# Patient Record
Sex: FEMALE | Race: WHITE | Employment: OTHER | URBAN - METROPOLITAN AREA
[De-identification: names, ages, dates, MRNs, and addresses within clinical notes are randomized per-mention and may not be internally consistent; named-entity substitution may affect disease eponyms.]

---

## 2020-05-05 ENCOUNTER — APPOINTMENT (OUTPATIENT)
Dept: CT IMAGING | Age: 60
DRG: 371 | End: 2020-05-05
Payer: MEDICARE

## 2020-05-05 ENCOUNTER — HOSPITAL ENCOUNTER (INPATIENT)
Age: 60
LOS: 3 days | Discharge: HOME | DRG: 371 | End: 2020-05-08
Attending: EMERGENCY MEDICINE | Admitting: STUDENT IN AN ORGANIZED HEALTH CARE EDUCATION/TRAINING PROGRAM
Payer: MEDICARE

## 2020-05-05 PROBLEM — K52.9 COLITIS: Status: ACTIVE | Noted: 2020-05-05

## 2020-05-05 LAB
ALBUMIN SERPL-MCNC: 3.8 GM/DL (ref 3.4–5)
ALP BLD-CCNC: 89 IU/L (ref 40–128)
ALT SERPL-CCNC: 33 U/L (ref 10–40)
AMPHETAMINES: ABNORMAL
ANION GAP SERPL CALCULATED.3IONS-SCNC: 14 MMOL/L (ref 4–16)
AST SERPL-CCNC: 55 IU/L (ref 15–37)
BACTERIA: ABNORMAL /HPF
BARBITURATE SCREEN URINE: NEGATIVE
BASOPHILS ABSOLUTE: 0.1 K/CU MM
BASOPHILS RELATIVE PERCENT: 0.5 % (ref 0–1)
BENZODIAZEPINE SCREEN, URINE: NEGATIVE
BILIRUB SERPL-MCNC: 0.7 MG/DL (ref 0–1)
BILIRUBIN URINE: NEGATIVE MG/DL
BLOOD, URINE: NEGATIVE
BUN BLDV-MCNC: 25 MG/DL (ref 6–23)
CALCIUM SERPL-MCNC: 9.8 MG/DL (ref 8.3–10.6)
CANNABINOID SCREEN URINE: ABNORMAL
CHLORIDE BLD-SCNC: 88 MMOL/L (ref 99–110)
CLARITY: CLEAR
CO2: 25 MMOL/L (ref 21–32)
COCAINE METABOLITE: ABNORMAL
COLOR: ABNORMAL
CREAT SERPL-MCNC: 1.1 MG/DL (ref 0.6–1.1)
DIFFERENTIAL TYPE: ABNORMAL
EOSINOPHILS ABSOLUTE: 0 K/CU MM
EOSINOPHILS RELATIVE PERCENT: 0.3 % (ref 0–3)
GFR AFRICAN AMERICAN: >60 ML/MIN/1.73M2
GFR NON-AFRICAN AMERICAN: 51 ML/MIN/1.73M2
GLUCOSE BLD-MCNC: 173 MG/DL (ref 70–99)
GLUCOSE, URINE: NEGATIVE MG/DL
HCT VFR BLD CALC: 52 % (ref 37–47)
HEMOGLOBIN: 17.5 GM/DL (ref 12.5–16)
HYALINE CASTS: 5 /LPF
IMMATURE NEUTROPHIL %: 0.4 % (ref 0–0.43)
KETONES, URINE: NEGATIVE MG/DL
LACTATE: 2.2 MMOL/L (ref 0.4–2)
LEUKOCYTE ESTERASE, URINE: NEGATIVE
LIPASE: 10 IU/L (ref 13–60)
LYMPHOCYTES ABSOLUTE: 0.8 K/CU MM
LYMPHOCYTES RELATIVE PERCENT: 6.7 % (ref 24–44)
MCH RBC QN AUTO: 32 PG (ref 27–31)
MCHC RBC AUTO-ENTMCNC: 33.7 % (ref 32–36)
MCV RBC AUTO: 95.1 FL (ref 78–100)
MONOCYTES ABSOLUTE: 0.3 K/CU MM
MONOCYTES RELATIVE PERCENT: 2.1 % (ref 0–4)
MUCUS: ABNORMAL HPF
NITRITE URINE, QUANTITATIVE: NEGATIVE
NUCLEATED RBC %: 0 %
OPIATES, URINE: ABNORMAL
OXYCODONE: NEGATIVE
PDW BLD-RTO: 12.3 % (ref 11.7–14.9)
PH, URINE: 5 (ref 5–8)
PHENCYCLIDINE, URINE: NEGATIVE
PLATELET # BLD: 363 K/CU MM (ref 140–440)
PMV BLD AUTO: 8.7 FL (ref 7.5–11.1)
POTASSIUM SERPL-SCNC: 4 MMOL/L (ref 3.5–5.1)
PROTEIN UA: 30 MG/DL
RBC # BLD: 5.47 M/CU MM (ref 4.2–5.4)
RBC URINE: <1 /HPF (ref 0–6)
SEGMENTED NEUTROPHILS ABSOLUTE COUNT: 10.9 K/CU MM
SEGMENTED NEUTROPHILS RELATIVE PERCENT: 90 % (ref 36–66)
SODIUM BLD-SCNC: 127 MMOL/L (ref 135–145)
SPECIFIC GRAVITY UA: 1.01 (ref 1–1.03)
SQUAMOUS EPITHELIAL: <1 /HPF
TOTAL IMMATURE NEUTOROPHIL: 0.05 K/CU MM
TOTAL NUCLEATED RBC: 0 K/CU MM
TOTAL PROTEIN: 7.3 GM/DL (ref 6.4–8.2)
TRICHOMONAS: ABNORMAL /HPF
UROBILINOGEN, URINE: 2 MG/DL (ref 0.2–1)
WBC # BLD: 12.2 K/CU MM (ref 4–10.5)
WBC UA: 1 /HPF (ref 0–5)

## 2020-05-05 PROCEDURE — 99285 EMERGENCY DEPT VISIT HI MDM: CPT

## 2020-05-05 PROCEDURE — 6360000004 HC RX CONTRAST MEDICATION: Performed by: EMERGENCY MEDICINE

## 2020-05-05 PROCEDURE — 1200000000 HC SEMI PRIVATE

## 2020-05-05 PROCEDURE — 83605 ASSAY OF LACTIC ACID: CPT

## 2020-05-05 PROCEDURE — 96372 THER/PROPH/DIAG INJ SC/IM: CPT

## 2020-05-05 PROCEDURE — 74177 CT ABD & PELVIS W/CONTRAST: CPT

## 2020-05-05 PROCEDURE — 81001 URINALYSIS AUTO W/SCOPE: CPT

## 2020-05-05 PROCEDURE — 80053 COMPREHEN METABOLIC PANEL: CPT

## 2020-05-05 PROCEDURE — 2580000003 HC RX 258: Performed by: STUDENT IN AN ORGANIZED HEALTH CARE EDUCATION/TRAINING PROGRAM

## 2020-05-05 PROCEDURE — 2500000003 HC RX 250 WO HCPCS: Performed by: EMERGENCY MEDICINE

## 2020-05-05 PROCEDURE — 6370000000 HC RX 637 (ALT 250 FOR IP): Performed by: STUDENT IN AN ORGANIZED HEALTH CARE EDUCATION/TRAINING PROGRAM

## 2020-05-05 PROCEDURE — 2580000003 HC RX 258: Performed by: EMERGENCY MEDICINE

## 2020-05-05 PROCEDURE — 2700000000 HC OXYGEN THERAPY PER DAY

## 2020-05-05 PROCEDURE — 6360000002 HC RX W HCPCS: Performed by: STUDENT IN AN ORGANIZED HEALTH CARE EDUCATION/TRAINING PROGRAM

## 2020-05-05 PROCEDURE — 80307 DRUG TEST PRSMV CHEM ANLYZR: CPT

## 2020-05-05 PROCEDURE — 96374 THER/PROPH/DIAG INJ IV PUSH: CPT

## 2020-05-05 PROCEDURE — 85025 COMPLETE CBC W/AUTO DIFF WBC: CPT

## 2020-05-05 PROCEDURE — 6360000002 HC RX W HCPCS: Performed by: EMERGENCY MEDICINE

## 2020-05-05 PROCEDURE — 6370000000 HC RX 637 (ALT 250 FOR IP): Performed by: EMERGENCY MEDICINE

## 2020-05-05 PROCEDURE — 94761 N-INVAS EAR/PLS OXIMETRY MLT: CPT

## 2020-05-05 PROCEDURE — 96375 TX/PRO/DX INJ NEW DRUG ADDON: CPT

## 2020-05-05 PROCEDURE — 96376 TX/PRO/DX INJ SAME DRUG ADON: CPT

## 2020-05-05 PROCEDURE — 83690 ASSAY OF LIPASE: CPT

## 2020-05-05 RX ORDER — MORPHINE SULFATE 4 MG/ML
4 INJECTION, SOLUTION INTRAMUSCULAR; INTRAVENOUS EVERY 30 MIN PRN
Status: DISCONTINUED | OUTPATIENT
Start: 2020-05-05 | End: 2020-05-05

## 2020-05-05 RX ORDER — SODIUM CHLORIDE 0.9 % (FLUSH) 0.9 %
10 SYRINGE (ML) INJECTION EVERY 12 HOURS SCHEDULED
Status: DISCONTINUED | OUTPATIENT
Start: 2020-05-05 | End: 2020-05-08 | Stop reason: HOSPADM

## 2020-05-05 RX ORDER — TRAMADOL HYDROCHLORIDE 50 MG/1
50 TABLET ORAL PRN
Status: DISCONTINUED | OUTPATIENT
Start: 2020-05-05 | End: 2020-05-08

## 2020-05-05 RX ORDER — ONDANSETRON 2 MG/ML
4 INJECTION INTRAMUSCULAR; INTRAVENOUS EVERY 30 MIN PRN
Status: DISCONTINUED | OUTPATIENT
Start: 2020-05-05 | End: 2020-05-05

## 2020-05-05 RX ORDER — DICYCLOMINE HYDROCHLORIDE 10 MG/ML
20 INJECTION INTRAMUSCULAR ONCE
Status: COMPLETED | OUTPATIENT
Start: 2020-05-05 | End: 2020-05-05

## 2020-05-05 RX ORDER — ACETAMINOPHEN 650 MG/1
650 SUPPOSITORY RECTAL EVERY 6 HOURS PRN
Status: DISCONTINUED | OUTPATIENT
Start: 2020-05-05 | End: 2020-05-08 | Stop reason: HOSPADM

## 2020-05-05 RX ORDER — CIPROFLOXACIN 500 MG/1
500 TABLET, FILM COATED ORAL ONCE
Status: COMPLETED | OUTPATIENT
Start: 2020-05-05 | End: 2020-05-05

## 2020-05-05 RX ORDER — POLYETHYLENE GLYCOL 3350 17 G/17G
17 POWDER, FOR SOLUTION ORAL DAILY PRN
Status: DISCONTINUED | OUTPATIENT
Start: 2020-05-05 | End: 2020-05-08 | Stop reason: HOSPADM

## 2020-05-05 RX ORDER — METRONIDAZOLE 250 MG/1
500 TABLET ORAL ONCE
Status: COMPLETED | OUTPATIENT
Start: 2020-05-05 | End: 2020-05-05

## 2020-05-05 RX ORDER — 0.9 % SODIUM CHLORIDE 0.9 %
1000 INTRAVENOUS SOLUTION INTRAVENOUS ONCE
Status: COMPLETED | OUTPATIENT
Start: 2020-05-05 | End: 2020-05-05

## 2020-05-05 RX ORDER — ACETAMINOPHEN 325 MG/1
650 TABLET ORAL EVERY 6 HOURS PRN
Status: DISCONTINUED | OUTPATIENT
Start: 2020-05-05 | End: 2020-05-08 | Stop reason: HOSPADM

## 2020-05-05 RX ORDER — PROMETHAZINE HYDROCHLORIDE 25 MG/1
12.5 TABLET ORAL EVERY 6 HOURS PRN
Status: DISCONTINUED | OUTPATIENT
Start: 2020-05-05 | End: 2020-05-08 | Stop reason: HOSPADM

## 2020-05-05 RX ORDER — DICYCLOMINE HCL 20 MG
20 TABLET ORAL EVERY 6 HOURS PRN
Status: DISCONTINUED | OUTPATIENT
Start: 2020-05-05 | End: 2020-05-08 | Stop reason: HOSPADM

## 2020-05-05 RX ORDER — DIPHENHYDRAMINE HCL 25 MG
50 TABLET ORAL NIGHTLY PRN
Status: DISCONTINUED | OUTPATIENT
Start: 2020-05-05 | End: 2020-05-08 | Stop reason: HOSPADM

## 2020-05-05 RX ORDER — SODIUM CHLORIDE 0.9 % (FLUSH) 0.9 %
10 SYRINGE (ML) INJECTION PRN
Status: DISCONTINUED | OUTPATIENT
Start: 2020-05-05 | End: 2020-05-08 | Stop reason: HOSPADM

## 2020-05-05 RX ORDER — MORPHINE SULFATE 4 MG/ML
8 INJECTION, SOLUTION INTRAMUSCULAR; INTRAVENOUS ONCE
Status: COMPLETED | OUTPATIENT
Start: 2020-05-05 | End: 2020-05-05

## 2020-05-05 RX ORDER — ONDANSETRON 2 MG/ML
4 INJECTION INTRAMUSCULAR; INTRAVENOUS EVERY 6 HOURS PRN
Status: DISCONTINUED | OUTPATIENT
Start: 2020-05-05 | End: 2020-05-08 | Stop reason: HOSPADM

## 2020-05-05 RX ORDER — CLONIDINE HYDROCHLORIDE 0.1 MG/1
0.1 TABLET ORAL PRN
Status: DISCONTINUED | OUTPATIENT
Start: 2020-05-05 | End: 2020-05-08 | Stop reason: HOSPADM

## 2020-05-05 RX ORDER — 0.9 % SODIUM CHLORIDE 0.9 %
500 INTRAVENOUS SOLUTION INTRAVENOUS ONCE
Status: COMPLETED | OUTPATIENT
Start: 2020-05-05 | End: 2020-05-05

## 2020-05-05 RX ADMIN — SODIUM CHLORIDE 1000 ML: 9 INJECTION, SOLUTION INTRAVENOUS at 08:27

## 2020-05-05 RX ADMIN — TRAMADOL HYDROCHLORIDE 50 MG: 50 TABLET, FILM COATED ORAL at 20:19

## 2020-05-05 RX ADMIN — METRONIDAZOLE 500 MG: 250 TABLET ORAL at 07:52

## 2020-05-05 RX ADMIN — ONDANSETRON 4 MG: 2 INJECTION INTRAMUSCULAR; INTRAVENOUS at 08:27

## 2020-05-05 RX ADMIN — PROMETHAZINE HYDROCHLORIDE 12.5 MG: 25 TABLET ORAL at 14:21

## 2020-05-05 RX ADMIN — ONDANSETRON 4 MG: 2 INJECTION INTRAMUSCULAR; INTRAVENOUS at 05:00

## 2020-05-05 RX ADMIN — CLONIDINE HYDROCHLORIDE 0.1 MG: 0.1 TABLET ORAL at 14:35

## 2020-05-05 RX ADMIN — DICYCLOMINE HYDROCHLORIDE 20 MG: 20 INJECTION, SOLUTION INTRAMUSCULAR at 08:27

## 2020-05-05 RX ADMIN — ONDANSETRON 4 MG: 2 INJECTION INTRAMUSCULAR; INTRAVENOUS at 20:18

## 2020-05-05 RX ADMIN — CLONIDINE HYDROCHLORIDE 0.1 MG: 0.1 TABLET ORAL at 16:20

## 2020-05-05 RX ADMIN — SODIUM CHLORIDE, PRESERVATIVE FREE 10 ML: 5 INJECTION INTRAVENOUS at 20:19

## 2020-05-05 RX ADMIN — DIPHENHYDRAMINE HYDROCHLORIDE 50 MG: 25 TABLET ORAL at 20:19

## 2020-05-05 RX ADMIN — TRAMADOL HYDROCHLORIDE 50 MG: 50 TABLET, FILM COATED ORAL at 14:35

## 2020-05-05 RX ADMIN — TRAMADOL HYDROCHLORIDE 50 MG: 50 TABLET, FILM COATED ORAL at 16:20

## 2020-05-05 RX ADMIN — MORPHINE SULFATE 4 MG: 4 INJECTION, SOLUTION INTRAMUSCULAR; INTRAVENOUS at 08:28

## 2020-05-05 RX ADMIN — FAMOTIDINE 20 MG: 10 INJECTION INTRAVENOUS at 08:27

## 2020-05-05 RX ADMIN — DICYCLOMINE HYDROCHLORIDE 20 MG: 20 TABLET ORAL at 20:19

## 2020-05-05 RX ADMIN — DICYCLOMINE HYDROCHLORIDE 20 MG: 20 TABLET ORAL at 14:21

## 2020-05-05 RX ADMIN — SODIUM CHLORIDE 500 ML: 9 INJECTION, SOLUTION INTRAVENOUS at 05:00

## 2020-05-05 RX ADMIN — CLONIDINE HYDROCHLORIDE 0.1 MG: 0.1 TABLET ORAL at 20:19

## 2020-05-05 RX ADMIN — MORPHINE SULFATE 8 MG: 4 INJECTION, SOLUTION INTRAMUSCULAR; INTRAVENOUS at 05:00

## 2020-05-05 RX ADMIN — CIPROFLOXACIN HYDROCHLORIDE 500 MG: 500 TABLET, FILM COATED ORAL at 07:52

## 2020-05-05 RX ADMIN — ACETAMINOPHEN 650 MG: 325 TABLET ORAL at 14:21

## 2020-05-05 RX ADMIN — IOPAMIDOL 75 ML: 755 INJECTION, SOLUTION INTRAVENOUS at 06:50

## 2020-05-05 ASSESSMENT — PAIN DESCRIPTION - FREQUENCY
FREQUENCY: CONTINUOUS
FREQUENCY: CONTINUOUS

## 2020-05-05 ASSESSMENT — PAIN SCALES - GENERAL
PAINLEVEL_OUTOF10: 10
PAINLEVEL_OUTOF10: 8
PAINLEVEL_OUTOF10: 10
PAINLEVEL_OUTOF10: 8
PAINLEVEL_OUTOF10: 10
PAINLEVEL_OUTOF10: 10
PAINLEVEL_OUTOF10: 0

## 2020-05-05 ASSESSMENT — PAIN DESCRIPTION - LOCATION
LOCATION: HEAD;ABDOMEN
LOCATION: ABDOMEN

## 2020-05-05 NOTE — ED NOTES
0815 paged hospitlalist     [facility]  05/05/20 0815  0831 repaged hospitalist     [facility]  05/05/20 0832  [phone] Dr Maisha Fontanez in ED     [facility]  05/05/20 2269

## 2020-05-05 NOTE — ED NOTES
Patient resting in bed with eyes shut. Easily woke. Resident assisted to commode to obtain urine sample but stated \"I cant pee. \" She sat on the commode for a bit with no urine produced.       Pelon Colindres, RN  05/05/20 3935

## 2020-05-05 NOTE — ED PROVIDER NOTES
05/05/20abjorn Menendez was checked out to me by Dr. Wendy Orellana. Please see his/her initial documentation for details of the patient's ED presentation, physical exam and completed studies. In brief, Amado Menendez is a 62 y.o. female that presents with abdominal pain awaiting labs/imaging.     I have reviewed and interpreted all of the currently available lab results from this visit (if applicable):  Results for orders placed or performed during the hospital encounter of 05/05/20   CBC Auto Differential   Result Value Ref Range    WBC 12.2 (H) 4.0 - 10.5 K/CU MM    RBC 5.47 (H) 4.2 - 5.4 M/CU MM    Hemoglobin 17.5 (H) 12.5 - 16.0 GM/DL    Hematocrit 52.0 (H) 37 - 47 %    MCV 95.1 78 - 100 FL    MCH 32.0 (H) 27 - 31 PG    MCHC 33.7 32.0 - 36.0 %    RDW 12.3 11.7 - 14.9 %    Platelets 109 345 - 185 K/CU MM    MPV 8.7 7.5 - 11.1 FL    Differential Type AUTOMATED DIFFERENTIAL     Segs Relative 90.0 (H) 36 - 66 %    Lymphocytes % 6.7 (L) 24 - 44 %    Monocytes % 2.1 0 - 4 %    Eosinophils % 0.3 0 - 3 %    Basophils % 0.5 0 - 1 %    Segs Absolute 10.9 K/CU MM    Lymphocytes Absolute 0.8 K/CU MM    Monocytes Absolute 0.3 K/CU MM    Eosinophils Absolute 0.0 K/CU MM    Basophils Absolute 0.1 K/CU MM    Nucleated RBC % 0.0 %    Total Nucleated RBC 0.0 K/CU MM    Total Immature Neutrophil 0.05 K/CU MM    Immature Neutrophil % 0.4 0 - 0.43 %   Comprehensive Metabolic Panel w/ Reflex to MG   Result Value Ref Range    Sodium 127 (L) 135 - 145 MMOL/L    Potassium 4.0 3.5 - 5.1 MMOL/L    Chloride 88 (L) 99 - 110 mMol/L    CO2 25 21 - 32 MMOL/L    BUN 25 (H) 6 - 23 MG/DL    CREATININE 1.1 0.6 - 1.1 MG/DL    Glucose 173 (H) 70 - 99 MG/DL    Calcium 9.8 8.3 - 10.6 MG/DL    Alb 3.8 3.4 - 5.0 GM/DL    Total Protein 7.3 6.4 - 8.2 GM/DL    Total Bilirubin 0.7 0.0 - 1.0 MG/DL    ALT 33 10 - 40 U/L    AST 55 (H) 15 - 37 IU/L    Alkaline Phosphatase 89 40 - 128 IU/L    GFR Non- 51 (L) >60 mL/min/1.73m2    GFR African American >60 >60 mL/min/1.73m2    Anion Gap 14 4 - 16   Lactic Acid, Plasma   Result Value Ref Range    Lactate 2.2 (HH) 0.4 - 2.0 mMOL/L   Lipase   Result Value Ref Range    Lipase 10 (L) 13 - 60 IU/L     Total critical care time today provided was at least 20 minutes. This excludes seperately billable procedure. Critical care time provided for reviewing labs, images, giving antibiotics, IV fluids, consulting medicine that required close evaluation and/or intervention with concern for patient decompensation. MDM:    Patient here with abdominal pain awaiting labs/imaging. Recheck patient she still having abdominal pain. Will give her additional pain nausea medicine IV fluids. CT scan shows colitis has leukocytosis patient in too much pain to attempt to go home I gave her antibiotics will admit to hospital medicine for colitis abdominal pain otherwise patient stable    Final Impression:  1. Lower abdominal pain    2. Colitis    3.  Leukocytosis, unspecified type        (Please note that portions of this note may have been completed with a voice recognition program. Efforts were made to edit the dictations but occasionally words are mis-transcribed.)    Rosaura Morfin, [facility],   05/05/20 7853

## 2020-05-05 NOTE — H&P
tell me where she is but is able to tell me her name. Denies abdominal trauma. She states she has been taking the medications as directed from her last hospitalization. Denies fevers or chills. Denies sick contacts. 10-14 point ROS reviewed negative, unless as noted above    Objective:   No intake or output data in the 24 hours ending 05/05/20 0855   Vitals:   Vitals:    05/05/20 0446   BP: 132/85   Pulse: 73   Resp: 18   Temp: 97.9 °F (36.6 °C)   SpO2: 92%     Physical Exam:    GEN Awake female, sitting upright in bed in no mild distress. Appears given age. EYES Pupils are equally round. Not pinpoint. No scleral erythema, discharge, or conjunctivitis. HENT Mucous membranes are moist.   NECK No apparent thyromegaly or masses. RESP Clear to auscultation, no wheezes, rales or rhonchi. Symmetric chest movement while on room air. CARDIO/VASC Regular rate without appreciable murmurs, rubs, or gallops. Peripheral pulses equal bilaterally and palpable. No peripheral edema. GI +abdominen ttp in RLQ and RUQ. Bowel sounds are normoactive. Rectal exam deferred.  Clay catheter is not present. HEME/LYMPH No petechiae or ecchymoses. MSK No gross joint deformities. Spontaneous movement of all extremities  SKIN Normal coloration, warm, dry. NEURO Cranial nerves appear grossly intact, normal speech, no lateralizing weakness. Restless movement of BL feet  PSYCH Awake, alert, oriented to self. Affect flat    Past Medical History: PMHx: No past medical history on file. patient unable to give information. PSHx:  has no past surgical history on file. patient unable to give information. Allergies: No Known Allergies patient unable to give information. Home Medications:   Prior to Admission medications    Not on File     FHx: patient unable to give information. SHx:   Social History     Socioeconomic History    Marital status:       Spouse name: Not on file    Number of children: Not on file   

## 2020-05-06 LAB
ANION GAP SERPL CALCULATED.3IONS-SCNC: 10 MMOL/L (ref 4–16)
ANION GAP SERPL CALCULATED.3IONS-SCNC: 8 MMOL/L (ref 4–16)
ANISOCYTOSIS: ABNORMAL
BANDED NEUTROPHILS ABSOLUTE COUNT: 3.5 K/CU MM
BANDED NEUTROPHILS RELATIVE PERCENT: 28 % (ref 5–11)
BUN BLDV-MCNC: 21 MG/DL (ref 6–23)
BUN BLDV-MCNC: 26 MG/DL (ref 6–23)
CALCIUM SERPL-MCNC: 7.5 MG/DL (ref 8.3–10.6)
CALCIUM SERPL-MCNC: 8 MG/DL (ref 8.3–10.6)
CHLORIDE BLD-SCNC: 95 MMOL/L (ref 99–110)
CHLORIDE BLD-SCNC: 97 MMOL/L (ref 99–110)
CO2: 22 MMOL/L (ref 21–32)
CO2: 23 MMOL/L (ref 21–32)
CREAT SERPL-MCNC: 0.7 MG/DL (ref 0.6–1.1)
CREAT SERPL-MCNC: 1 MG/DL (ref 0.6–1.1)
DIFFERENTIAL TYPE: ABNORMAL
DOHLE BODIES: PRESENT
GFR AFRICAN AMERICAN: >60 ML/MIN/1.73M2
GFR AFRICAN AMERICAN: >60 ML/MIN/1.73M2
GFR NON-AFRICAN AMERICAN: 57 ML/MIN/1.73M2
GFR NON-AFRICAN AMERICAN: >60 ML/MIN/1.73M2
GLUCOSE BLD-MCNC: 100 MG/DL (ref 70–99)
GLUCOSE BLD-MCNC: 106 MG/DL (ref 70–99)
HCT VFR BLD CALC: 43.1 % (ref 37–47)
HEMOGLOBIN: 13.9 GM/DL (ref 12.5–16)
LACTATE: 1.7 MMOL/L (ref 0.4–2)
LYMPHOCYTES ABSOLUTE: 0.9 K/CU MM
LYMPHOCYTES RELATIVE PERCENT: 7 % (ref 24–44)
MCH RBC QN AUTO: 31.4 PG (ref 27–31)
MCHC RBC AUTO-ENTMCNC: 32.3 % (ref 32–36)
MCV RBC AUTO: 97.3 FL (ref 78–100)
METAMYELOCYTES ABSOLUTE COUNT: 0.13 K/CU MM
METAMYELOCYTES PERCENT: 1 %
MONOCYTES ABSOLUTE: 0.4 K/CU MM
MONOCYTES RELATIVE PERCENT: 3 % (ref 0–4)
PDW BLD-RTO: 12.4 % (ref 11.7–14.9)
PLATELET # BLD: 246 K/CU MM (ref 140–440)
PMV BLD AUTO: 8.7 FL (ref 7.5–11.1)
POTASSIUM SERPL-SCNC: 4.2 MMOL/L (ref 3.5–5.1)
POTASSIUM SERPL-SCNC: 4.7 MMOL/L (ref 3.5–5.1)
RBC # BLD: 4.43 M/CU MM (ref 4.2–5.4)
SEGMENTED NEUTROPHILS ABSOLUTE COUNT: 7.6 K/CU MM
SEGMENTED NEUTROPHILS RELATIVE PERCENT: 61 % (ref 36–66)
SODIUM BLD-SCNC: 126 MMOL/L (ref 135–145)
SODIUM BLD-SCNC: 129 MMOL/L (ref 135–145)
TOXIC GRANULATION: PRESENT
TSH HIGH SENSITIVITY: 0.39 UIU/ML (ref 0.27–4.2)
WBC # BLD: 12.5 K/CU MM (ref 4–10.5)

## 2020-05-06 PROCEDURE — 1200000000 HC SEMI PRIVATE

## 2020-05-06 PROCEDURE — 84443 ASSAY THYROID STIM HORMONE: CPT

## 2020-05-06 PROCEDURE — 94761 N-INVAS EAR/PLS OXIMETRY MLT: CPT

## 2020-05-06 PROCEDURE — 83605 ASSAY OF LACTIC ACID: CPT

## 2020-05-06 PROCEDURE — 80048 BASIC METABOLIC PNL TOTAL CA: CPT

## 2020-05-06 PROCEDURE — 6370000000 HC RX 637 (ALT 250 FOR IP): Performed by: STUDENT IN AN ORGANIZED HEALTH CARE EDUCATION/TRAINING PROGRAM

## 2020-05-06 PROCEDURE — 6360000002 HC RX W HCPCS: Performed by: STUDENT IN AN ORGANIZED HEALTH CARE EDUCATION/TRAINING PROGRAM

## 2020-05-06 PROCEDURE — 85007 BL SMEAR W/DIFF WBC COUNT: CPT

## 2020-05-06 PROCEDURE — 85027 COMPLETE CBC AUTOMATED: CPT

## 2020-05-06 PROCEDURE — 94640 AIRWAY INHALATION TREATMENT: CPT

## 2020-05-06 PROCEDURE — 2580000003 HC RX 258: Performed by: STUDENT IN AN ORGANIZED HEALTH CARE EDUCATION/TRAINING PROGRAM

## 2020-05-06 PROCEDURE — 2700000000 HC OXYGEN THERAPY PER DAY

## 2020-05-06 RX ORDER — AMLODIPINE BESYLATE 10 MG/1
10 TABLET ORAL DAILY
Status: ON HOLD | COMMUNITY
End: 2020-05-08 | Stop reason: HOSPADM

## 2020-05-06 RX ORDER — PROMETHAZINE HYDROCHLORIDE 25 MG/1
25 TABLET ORAL EVERY 6 HOURS PRN
COMMUNITY

## 2020-05-06 RX ORDER — LOPERAMIDE HYDROCHLORIDE 2 MG/1
2 CAPSULE ORAL 3 TIMES DAILY PRN
Status: DISCONTINUED | OUTPATIENT
Start: 2020-05-06 | End: 2020-05-08 | Stop reason: HOSPADM

## 2020-05-06 RX ORDER — ALBUTEROL SULFATE 90 UG/1
1 AEROSOL, METERED RESPIRATORY (INHALATION) EVERY 6 HOURS PRN
COMMUNITY

## 2020-05-06 RX ORDER — CARVEDILOL 3.12 MG/1
3.12 TABLET ORAL 2 TIMES DAILY WITH MEALS
Status: ON HOLD | COMMUNITY
End: 2020-08-24 | Stop reason: SDUPTHER

## 2020-05-06 RX ORDER — ASPIRIN 81 MG/1
81 TABLET, CHEWABLE ORAL DAILY
COMMUNITY

## 2020-05-06 RX ORDER — TRAZODONE HYDROCHLORIDE 50 MG/1
50 TABLET ORAL NIGHTLY
COMMUNITY

## 2020-05-06 RX ORDER — CLOPIDOGREL BISULFATE 75 MG/1
75 TABLET ORAL DAILY
Status: DISCONTINUED | OUTPATIENT
Start: 2020-05-06 | End: 2020-05-08 | Stop reason: HOSPADM

## 2020-05-06 RX ORDER — 0.9 % SODIUM CHLORIDE 0.9 %
1000 INTRAVENOUS SOLUTION INTRAVENOUS ONCE
Status: COMPLETED | OUTPATIENT
Start: 2020-05-06 | End: 2020-05-06

## 2020-05-06 RX ORDER — CLOPIDOGREL BISULFATE 75 MG/1
75 TABLET ORAL DAILY
COMMUNITY

## 2020-05-06 RX ORDER — GABAPENTIN 800 MG/1
800 TABLET ORAL 3 TIMES DAILY
COMMUNITY

## 2020-05-06 RX ORDER — ATORVASTATIN CALCIUM 80 MG/1
80 TABLET, FILM COATED ORAL NIGHTLY
COMMUNITY

## 2020-05-06 RX ORDER — ESCITALOPRAM OXALATE 20 MG/1
20 TABLET ORAL DAILY
COMMUNITY

## 2020-05-06 RX ORDER — ASPIRIN 81 MG/1
81 TABLET, CHEWABLE ORAL DAILY
Status: DISCONTINUED | OUTPATIENT
Start: 2020-05-06 | End: 2020-05-08 | Stop reason: HOSPADM

## 2020-05-06 RX ORDER — CLONIDINE HYDROCHLORIDE 0.1 MG/1
0.1 TABLET ORAL 2 TIMES DAILY
Status: ON HOLD | COMMUNITY
End: 2020-05-08 | Stop reason: HOSPADM

## 2020-05-06 RX ORDER — LEVOTHYROXINE SODIUM 0.1 MG/1
100 TABLET ORAL DAILY
Status: DISCONTINUED | OUTPATIENT
Start: 2020-05-06 | End: 2020-05-08 | Stop reason: HOSPADM

## 2020-05-06 RX ORDER — BUDESONIDE AND FORMOTEROL FUMARATE DIHYDRATE 80; 4.5 UG/1; UG/1
2 AEROSOL RESPIRATORY (INHALATION) 2 TIMES DAILY
Status: DISCONTINUED | OUTPATIENT
Start: 2020-05-06 | End: 2020-05-08 | Stop reason: HOSPADM

## 2020-05-06 RX ORDER — SODIUM CHLORIDE 9 MG/ML
INJECTION, SOLUTION INTRAVENOUS CONTINUOUS
Status: DISCONTINUED | OUTPATIENT
Start: 2020-05-06 | End: 2020-05-08 | Stop reason: HOSPADM

## 2020-05-06 RX ORDER — TRAZODONE HYDROCHLORIDE 50 MG/1
50 TABLET ORAL NIGHTLY
Status: DISCONTINUED | OUTPATIENT
Start: 2020-05-06 | End: 2020-05-08 | Stop reason: HOSPADM

## 2020-05-06 RX ORDER — TRAMADOL HYDROCHLORIDE 50 MG/1
50 TABLET ORAL EVERY 6 HOURS PRN
COMMUNITY

## 2020-05-06 RX ORDER — GABAPENTIN 400 MG/1
800 CAPSULE ORAL 3 TIMES DAILY
Status: DISCONTINUED | OUTPATIENT
Start: 2020-05-06 | End: 2020-05-08 | Stop reason: HOSPADM

## 2020-05-06 RX ORDER — DOCUSATE SODIUM 100 MG/1
100 CAPSULE, LIQUID FILLED ORAL 2 TIMES DAILY
Status: ON HOLD | COMMUNITY
End: 2020-08-24 | Stop reason: SDUPTHER

## 2020-05-06 RX ORDER — LEVOTHYROXINE SODIUM 0.1 MG/1
100 TABLET ORAL DAILY
COMMUNITY

## 2020-05-06 RX ORDER — IPRATROPIUM BROMIDE AND ALBUTEROL SULFATE 2.5; .5 MG/3ML; MG/3ML
1 SOLUTION RESPIRATORY (INHALATION)
Status: DISCONTINUED | OUTPATIENT
Start: 2020-05-06 | End: 2020-05-06

## 2020-05-06 RX ORDER — IPRATROPIUM BROMIDE AND ALBUTEROL SULFATE 2.5; .5 MG/3ML; MG/3ML
1 SOLUTION RESPIRATORY (INHALATION) EVERY 4 HOURS PRN
Status: DISCONTINUED | OUTPATIENT
Start: 2020-05-06 | End: 2020-05-08 | Stop reason: HOSPADM

## 2020-05-06 RX ORDER — FLUTICASONE PROPIONATE 50 MCG
2 SPRAY, SUSPENSION (ML) NASAL DAILY
Status: ON HOLD | COMMUNITY
End: 2020-08-24 | Stop reason: HOSPADM

## 2020-05-06 RX ADMIN — LEVOTHYROXINE SODIUM 100 MCG: 100 TABLET ORAL at 14:26

## 2020-05-06 RX ADMIN — CLONIDINE HYDROCHLORIDE 0.1 MG: 0.1 TABLET ORAL at 02:36

## 2020-05-06 RX ADMIN — IPRATROPIUM BROMIDE AND ALBUTEROL SULFATE 1 AMPULE: .5; 3 SOLUTION RESPIRATORY (INHALATION) at 14:23

## 2020-05-06 RX ADMIN — ASPIRIN 81 MG 81 MG: 81 TABLET ORAL at 09:25

## 2020-05-06 RX ADMIN — SODIUM CHLORIDE 1000 ML: 9 INJECTION, SOLUTION INTRAVENOUS at 09:25

## 2020-05-06 RX ADMIN — BUDESONIDE AND FORMOTEROL FUMARATE DIHYDRATE 2 PUFF: 80; 4.5 AEROSOL RESPIRATORY (INHALATION) at 14:24

## 2020-05-06 RX ADMIN — GABAPENTIN 800 MG: 400 CAPSULE ORAL at 14:26

## 2020-05-06 RX ADMIN — CLOPIDOGREL BISULFATE 75 MG: 75 TABLET ORAL at 09:25

## 2020-05-06 RX ADMIN — TRAMADOL HYDROCHLORIDE 50 MG: 50 TABLET, FILM COATED ORAL at 14:27

## 2020-05-06 RX ADMIN — BUDESONIDE AND FORMOTEROL FUMARATE DIHYDRATE 2 PUFF: 80; 4.5 AEROSOL RESPIRATORY (INHALATION) at 21:51

## 2020-05-06 RX ADMIN — TRAMADOL HYDROCHLORIDE 50 MG: 50 TABLET, FILM COATED ORAL at 02:36

## 2020-05-06 RX ADMIN — SODIUM CHLORIDE: 900 INJECTION INTRAVENOUS at 14:27

## 2020-05-06 RX ADMIN — CLONIDINE HYDROCHLORIDE 0.1 MG: 0.1 TABLET ORAL at 22:08

## 2020-05-06 RX ADMIN — GABAPENTIN 800 MG: 400 CAPSULE ORAL at 22:08

## 2020-05-06 RX ADMIN — SODIUM CHLORIDE: 900 INJECTION INTRAVENOUS at 22:08

## 2020-05-06 RX ADMIN — DICYCLOMINE HYDROCHLORIDE 20 MG: 20 TABLET ORAL at 22:08

## 2020-05-06 RX ADMIN — SODIUM CHLORIDE, PRESERVATIVE FREE 10 ML: 5 INJECTION INTRAVENOUS at 07:52

## 2020-05-06 RX ADMIN — ENOXAPARIN SODIUM 40 MG: 40 INJECTION SUBCUTANEOUS at 07:52

## 2020-05-06 RX ADMIN — TRAMADOL HYDROCHLORIDE 50 MG: 50 TABLET, FILM COATED ORAL at 22:07

## 2020-05-06 RX ADMIN — SODIUM CHLORIDE, PRESERVATIVE FREE 10 ML: 5 INJECTION INTRAVENOUS at 22:08

## 2020-05-06 RX ADMIN — CLONIDINE HYDROCHLORIDE 0.1 MG: 0.1 TABLET ORAL at 14:26

## 2020-05-06 RX ADMIN — TRAZODONE HYDROCHLORIDE 50 MG: 50 TABLET ORAL at 22:08

## 2020-05-06 RX ADMIN — DIPHENHYDRAMINE HYDROCHLORIDE 50 MG: 25 TABLET ORAL at 22:08

## 2020-05-06 ASSESSMENT — PAIN DESCRIPTION - ONSET: ONSET: ON-GOING

## 2020-05-06 ASSESSMENT — PAIN SCALES - GENERAL
PAINLEVEL_OUTOF10: 8
PAINLEVEL_OUTOF10: 10
PAINLEVEL_OUTOF10: 8
PAINLEVEL_OUTOF10: 0
PAINLEVEL_OUTOF10: 7

## 2020-05-06 ASSESSMENT — PAIN DESCRIPTION - LOCATION: LOCATION: ABDOMEN

## 2020-05-06 ASSESSMENT — PAIN DESCRIPTION - FREQUENCY: FREQUENCY: INTERMITTENT

## 2020-05-06 ASSESSMENT — PAIN DESCRIPTION - DESCRIPTORS: DESCRIPTORS: SHARP

## 2020-05-06 ASSESSMENT — PAIN DESCRIPTION - PAIN TYPE: TYPE: ACUTE PAIN

## 2020-05-06 NOTE — PROGRESS NOTES
mcg/dose diskus inhaler   Inhale 1 puff into the lungs 2 times daily. PMHX  nstemi listed, 4/30/2020  Hepatitis C  Drug overdose, 10/2018    PSx  Cervical spine fusion, 4/2016    FHx  Heart disease - brother, mother, father    SHx  Every day smoker of cigarettes    Listed as patient outreach contact - yuval Carrasco    Full CODE    Patient Contacts  Contact Name Contact Address Communication Relationship to Patient   Nunu Rodriguez [address]  Siri El, [address] [phone] (Home) Sister, Emergency Contact       SUMMARY TTE, 4/29/20:    1. Left ventricle: The cavity size was normal. Wall thickness was      moderately increased. There was moderate concentric hypertrophy.      Systolic function was normal. The estimated ejection fraction      was in the range of 55% to 60%. Wall motion was normal; there      were no regional wall motion abnormalities. Doppler parameters      are consistent with abnormal left ventricular relaxation (grade      1 diastolic dysfunction).    2. Right ventricle:  The cavity size was normal. Wall thickness was      normal. Systolic function was normal.      Insurance -   MEDICARE MEDICARE PART A AND B [id number] 2/1/2019-Present         MEDICARE MEDICARE PART A AND B Derrick Plater 4/1/2020-Present

## 2020-05-06 NOTE — PROGRESS NOTES
Hospitalist Progress Note      Name:  Maggie Meehan /Age/Sex: 1960  (62 y.o. female)   MRN & CSN:  [id number] & [id number] Admission Date/Time: 2020  4:56 AM   Location:  [facility] PCP: Ermelinda Waddell. Alex Mendez 59 Day: 2    Assessment and Plan:   Maggie Meehan is a 62 y.o.  female  who presents with:    · Acute abdominal pain, found to have acute colitis of the ascending colon  · Hyponatremia, likely due to dehydration  · Acute metabolic encephalopathy secondary to hyponatremia and drug abuse  · Drug abuse, UDS+ for cocaine, amph, marijuana, opiates  · UTI for criteria not met. Contaminated UA. · CAD s/p recent NSTEMI, plavix and aspirin  · No memory of the last week; no details can be produced by the patient about the days [place] [facility] [place]. She claims she 'takes her medications every morning' but I cannot believe that if she doesn't know how many days have gone by. Restart aspirin and plavix  Stat TSH  Breathing treatments  Copied PMHx from [facility] notes and will add to epic   Imodium  Cows for w/d    Diet DIET CLEAR LIQUID;   DVT Prophylaxis [] Lovenox, []  Heparin, [] SCDs, []No VTE prophylaxis, patient ambulating   GI Prophylaxis [] PPI, [] H2 Blocker, [] No GI prophylaxis, patient is receiving diet/Tube Feeds   Code Status Full Code   Disposition Patient requires continued admission due to monitoring  Dc in AM if tolerating diet   MDM [] Low, [] Moderate,[x]  High  Patient's risk as above due to     [] One or more chronic illnesses with severe exacerbation or progression    [] Acute or chronic illnesses or injuries that pose a threat to life or bodily function    [] An abrupt change in neurological status    [] Decision not to resuscitate     [x] Drug therapy requiring intensive monitoring for toxicity      History of Present Illness:     Pt S&E. She is alert and oriented today  She got agitated with pressed about her UDS findings.   She

## 2020-05-07 LAB
ANION GAP SERPL CALCULATED.3IONS-SCNC: 13 MMOL/L (ref 4–16)
BUN BLDV-MCNC: 11 MG/DL (ref 6–23)
CALCIUM SERPL-MCNC: 7.5 MG/DL (ref 8.3–10.6)
CHLORIDE BLD-SCNC: 101 MMOL/L (ref 99–110)
CO2: 21 MMOL/L (ref 21–32)
CREAT SERPL-MCNC: 0.6 MG/DL (ref 0.6–1.1)
GFR AFRICAN AMERICAN: >60 ML/MIN/1.73M2
GFR NON-AFRICAN AMERICAN: >60 ML/MIN/1.73M2
GLUCOSE BLD-MCNC: 93 MG/DL (ref 70–99)
POTASSIUM SERPL-SCNC: 3.6 MMOL/L (ref 3.5–5.1)
SODIUM BLD-SCNC: 135 MMOL/L (ref 135–145)

## 2020-05-07 PROCEDURE — 2700000000 HC OXYGEN THERAPY PER DAY

## 2020-05-07 PROCEDURE — 6360000002 HC RX W HCPCS: Performed by: STUDENT IN AN ORGANIZED HEALTH CARE EDUCATION/TRAINING PROGRAM

## 2020-05-07 PROCEDURE — 36415 COLL VENOUS BLD VENIPUNCTURE: CPT

## 2020-05-07 PROCEDURE — 2580000003 HC RX 258: Performed by: STUDENT IN AN ORGANIZED HEALTH CARE EDUCATION/TRAINING PROGRAM

## 2020-05-07 PROCEDURE — 6370000000 HC RX 637 (ALT 250 FOR IP): Performed by: STUDENT IN AN ORGANIZED HEALTH CARE EDUCATION/TRAINING PROGRAM

## 2020-05-07 PROCEDURE — 80048 BASIC METABOLIC PNL TOTAL CA: CPT

## 2020-05-07 PROCEDURE — 94640 AIRWAY INHALATION TREATMENT: CPT

## 2020-05-07 PROCEDURE — 94761 N-INVAS EAR/PLS OXIMETRY MLT: CPT

## 2020-05-07 PROCEDURE — 1200000000 HC SEMI PRIVATE

## 2020-05-07 RX ORDER — METRONIDAZOLE 250 MG/1
500 TABLET ORAL EVERY 8 HOURS SCHEDULED
Status: DISCONTINUED | OUTPATIENT
Start: 2020-05-07 | End: 2020-05-08 | Stop reason: HOSPADM

## 2020-05-07 RX ORDER — ALBUTEROL SULFATE 90 UG/1
2 AEROSOL, METERED RESPIRATORY (INHALATION) EVERY 4 HOURS PRN
Status: DISCONTINUED | OUTPATIENT
Start: 2020-05-07 | End: 2020-05-08 | Stop reason: HOSPADM

## 2020-05-07 RX ORDER — 0.9 % SODIUM CHLORIDE 0.9 %
1000 INTRAVENOUS SOLUTION INTRAVENOUS ONCE
Status: COMPLETED | OUTPATIENT
Start: 2020-05-07 | End: 2020-05-07

## 2020-05-07 RX ORDER — CIPROFLOXACIN 500 MG/1
500 TABLET, FILM COATED ORAL EVERY 12 HOURS SCHEDULED
Status: DISCONTINUED | OUTPATIENT
Start: 2020-05-07 | End: 2020-05-08 | Stop reason: HOSPADM

## 2020-05-07 RX ADMIN — GABAPENTIN 800 MG: 400 CAPSULE ORAL at 22:02

## 2020-05-07 RX ADMIN — CLONIDINE HYDROCHLORIDE 0.1 MG: 0.1 TABLET ORAL at 14:48

## 2020-05-07 RX ADMIN — GABAPENTIN 800 MG: 400 CAPSULE ORAL at 08:36

## 2020-05-07 RX ADMIN — SODIUM CHLORIDE, PRESERVATIVE FREE 10 ML: 5 INJECTION INTRAVENOUS at 22:12

## 2020-05-07 RX ADMIN — TRAZODONE HYDROCHLORIDE 50 MG: 50 TABLET ORAL at 22:02

## 2020-05-07 RX ADMIN — SODIUM CHLORIDE 1000 ML: 9 INJECTION, SOLUTION INTRAVENOUS at 12:42

## 2020-05-07 RX ADMIN — DIPHENHYDRAMINE HYDROCHLORIDE 50 MG: 25 TABLET ORAL at 22:12

## 2020-05-07 RX ADMIN — CLONIDINE HYDROCHLORIDE 0.1 MG: 0.1 TABLET ORAL at 08:43

## 2020-05-07 RX ADMIN — SODIUM CHLORIDE: 900 INJECTION INTRAVENOUS at 07:00

## 2020-05-07 RX ADMIN — GABAPENTIN 800 MG: 400 CAPSULE ORAL at 14:41

## 2020-05-07 RX ADMIN — TRAMADOL HYDROCHLORIDE 50 MG: 50 TABLET, FILM COATED ORAL at 14:48

## 2020-05-07 RX ADMIN — ALBUTEROL SULFATE 2 PUFF: 90 AEROSOL, METERED RESPIRATORY (INHALATION) at 14:55

## 2020-05-07 RX ADMIN — TRAMADOL HYDROCHLORIDE 50 MG: 50 TABLET, FILM COATED ORAL at 22:12

## 2020-05-07 RX ADMIN — LEVOTHYROXINE SODIUM 100 MCG: 100 TABLET ORAL at 08:42

## 2020-05-07 RX ADMIN — METRONIDAZOLE 500 MG: 250 TABLET ORAL at 22:02

## 2020-05-07 RX ADMIN — DICYCLOMINE HYDROCHLORIDE 20 MG: 20 TABLET ORAL at 08:43

## 2020-05-07 RX ADMIN — CLOPIDOGREL BISULFATE 75 MG: 75 TABLET ORAL at 08:36

## 2020-05-07 RX ADMIN — ENOXAPARIN SODIUM 40 MG: 40 INJECTION SUBCUTANEOUS at 08:36

## 2020-05-07 RX ADMIN — CLONIDINE HYDROCHLORIDE 0.1 MG: 0.1 TABLET ORAL at 22:12

## 2020-05-07 RX ADMIN — ASPIRIN 81 MG 81 MG: 81 TABLET ORAL at 08:35

## 2020-05-07 RX ADMIN — BUDESONIDE AND FORMOTEROL FUMARATE DIHYDRATE 2 PUFF: 80; 4.5 AEROSOL RESPIRATORY (INHALATION) at 20:20

## 2020-05-07 RX ADMIN — METRONIDAZOLE 500 MG: 250 TABLET ORAL at 14:40

## 2020-05-07 RX ADMIN — CIPROFLOXACIN HYDROCHLORIDE 500 MG: 500 TABLET, FILM COATED ORAL at 12:21

## 2020-05-07 RX ADMIN — TRAMADOL HYDROCHLORIDE 50 MG: 50 TABLET, FILM COATED ORAL at 08:44

## 2020-05-07 RX ADMIN — Medication 2 PUFF: at 14:57

## 2020-05-07 RX ADMIN — SODIUM CHLORIDE: 900 INJECTION INTRAVENOUS at 14:49

## 2020-05-07 RX ADMIN — CIPROFLOXACIN HYDROCHLORIDE 500 MG: 500 TABLET, FILM COATED ORAL at 22:02

## 2020-05-07 ASSESSMENT — PAIN SCALES - GENERAL
PAINLEVEL_OUTOF10: 8
PAINLEVEL_OUTOF10: 0
PAINLEVEL_OUTOF10: 7
PAINLEVEL_OUTOF10: 0
PAINLEVEL_OUTOF10: 0
PAINLEVEL_OUTOF10: 6

## 2020-05-07 NOTE — PROGRESS NOTES
Hospitalist Progress Note      Name:  Alvarado Zabala /Age/Sex: 1960  (62 y.o. female)   MRN & CSN:  [id number] & [id number] Admission Date/Time: 2020  4:56 AM   Location:  [address] [place] [address] PCP: Dahlia Smith 59 Day: 3    Assessment and Plan:   Alvarado Zabala is a 62 y.o.  female  who presents with:    · Acute abdominal pain, found to have acute colitis of the ascending colon  · Hyponatremia, likely due to dehydration  · Acute metabolic encephalopathy secondary to hyponatremia and drug abuse  · Drug abuse, UDS+ for cocaine, amph, marijuana, opiates  · UTI for criteria not met. Contaminated UA. · CAD s/p recent NSTEMI, plavix and aspirin  · No memory of the last week; no details can be produced by the patient about the days between her discharge at [facility] and her admission here. She claims she 'takes her medications every morning' but I cannot believe that if she doesn't know how many days have gone by. Clinically improving without much intervention   No diarrhea today   Still detoxing   Added cipro/flagyl po  Adv diet  Dc to home in AM    Diet DIET CLEAR LIQUID;   DVT Prophylaxis [] Lovenox, []  Heparin, [] SCDs, []No VTE prophylaxis, patient ambulating   GI Prophylaxis [] PPI, [] H2 Blocker, [] No GI prophylaxis, patient is receiving diet/Tube Feeds   Code Status Full Code   Disposition Patient requires continued admission due to monitoring  Dc in AM if tolerating diet   MDM [] Low, [] Moderate,[x]  High  Patient's risk as above due to     [] One or more chronic illnesses with severe exacerbation or progression    [] Acute or chronic illnesses or injuries that pose a threat to life or bodily function    [] An abrupt change in neurological status    [] Decision not to resuscitate     [x] Drug therapy requiring intensive monitoring for toxicity      History of Present Illness:     Pt S&E.  She is requesting pain medications  Claiming abdominal pain  Denies diarrhea    10-14 point ROS reviewed negative, unless as noted above    Objective: Intake/Output Summary (Last 24 hours) at 5/7/2020 0811  Last data filed at 5/7/2020 0301  Gross per 24 hour   Intake 1810.83 ml   Output --   Net 1810.83 ml      Vitals:   Vitals:    05/07/20 0725   BP:    Pulse:    Resp:    Temp:    SpO2: 95%     Physical Exam:    GEN Awake female, laying in bed in no apparent distress. Appears given age. EYES Pupils are equally round. No scleral discharge  HENT Atraumatic and symmetric head  NECK No apparent thyromegaly  RESP Symmetric chest movement while on room air. CARDIO/VASC Peripheral pulses equal bilaterally and palpable. No peripheral edema. GI Abdomen is not distended. Rectal exam deferred. Soft and mildly tender to palpation - better than yesterday    Clay catheter is present. HEME/LYMPH No petechiae or ecchymoses. MSK Spontaneous movement of BL upper extremities  SKIN Normal coloration, warm, dry. NEURO Cranial nerves appear grossly intact  PSYCH Awake, alert.  Oriented x3    Medications:   Medications:    clopidogrel  75 mg Oral Daily    aspirin  81 mg Oral Daily    levothyroxine  100 mcg Oral Daily    traZODone  50 mg Oral Nightly    budesonide-formoterol  2 puff Inhalation BID    gabapentin  800 mg Oral TID    sodium chloride flush  10 mL Intravenous 2 times per day    enoxaparin  40 mg Subcutaneous Daily      Infusions:    sodium chloride 125 mL/hr at 05/07/20 0700     PRN Meds: loperamide, 2 mg, TID PRN  ipratropium-albuterol, 1 ampule, Q4H PRN  sodium chloride flush, 10 mL, PRN  acetaminophen, 650 mg, Q6H PRN    Or  acetaminophen, 650 mg, Q6H PRN  polyethylene glycol, 17 g, Daily PRN  promethazine, 12.5 mg, Q6H PRN    Or  ondansetron, 4 mg, Q6H PRN  traMADol, 50 mg, PRN    And  cloNIDine, 0.1 mg, PRN  diphenhydrAMINE, 50 mg, Nightly PRN  dicyclomine, 20 mg, Q6H PRN          Electronically signed by Radha Vasquez DO on 5/7/2020 at 8:11 AM

## 2020-05-08 VITALS
WEIGHT: 183.1 LBS | HEART RATE: 91 BPM | TEMPERATURE: 98.4 F | HEIGHT: 62 IN | RESPIRATION RATE: 18 BRPM | DIASTOLIC BLOOD PRESSURE: 86 MMHG | SYSTOLIC BLOOD PRESSURE: 182 MMHG | BODY MASS INDEX: 33.69 KG/M2 | OXYGEN SATURATION: 93 %

## 2020-05-08 PROCEDURE — 94640 AIRWAY INHALATION TREATMENT: CPT

## 2020-05-08 PROCEDURE — 94761 N-INVAS EAR/PLS OXIMETRY MLT: CPT

## 2020-05-08 PROCEDURE — 6370000000 HC RX 637 (ALT 250 FOR IP): Performed by: STUDENT IN AN ORGANIZED HEALTH CARE EDUCATION/TRAINING PROGRAM

## 2020-05-08 PROCEDURE — 2580000003 HC RX 258: Performed by: STUDENT IN AN ORGANIZED HEALTH CARE EDUCATION/TRAINING PROGRAM

## 2020-05-08 PROCEDURE — 6360000002 HC RX W HCPCS: Performed by: STUDENT IN AN ORGANIZED HEALTH CARE EDUCATION/TRAINING PROGRAM

## 2020-05-08 PROCEDURE — 2700000000 HC OXYGEN THERAPY PER DAY

## 2020-05-08 RX ORDER — TRAMADOL HYDROCHLORIDE 50 MG/1
50 TABLET ORAL EVERY 6 HOURS PRN
Status: DISCONTINUED | OUTPATIENT
Start: 2020-05-08 | End: 2020-05-08 | Stop reason: HOSPADM

## 2020-05-08 RX ORDER — METRONIDAZOLE 500 MG/1
500 TABLET ORAL EVERY 8 HOURS SCHEDULED
Qty: 30 TABLET | Refills: 0 | Status: SHIPPED | OUTPATIENT
Start: 2020-05-08 | End: 2020-05-18

## 2020-05-08 RX ORDER — CARVEDILOL 3.12 MG/1
3.12 TABLET ORAL 2 TIMES DAILY WITH MEALS
Status: DISCONTINUED | OUTPATIENT
Start: 2020-05-08 | End: 2020-05-08 | Stop reason: HOSPADM

## 2020-05-08 RX ORDER — ESCITALOPRAM OXALATE 10 MG/1
20 TABLET ORAL DAILY
Status: DISCONTINUED | OUTPATIENT
Start: 2020-05-08 | End: 2020-05-08 | Stop reason: HOSPADM

## 2020-05-08 RX ORDER — CIPROFLOXACIN 500 MG/1
500 TABLET, FILM COATED ORAL EVERY 12 HOURS SCHEDULED
Qty: 20 TABLET | Refills: 0 | Status: SHIPPED | OUTPATIENT
Start: 2020-05-08 | End: 2020-05-18

## 2020-05-08 RX ADMIN — CLONIDINE HYDROCHLORIDE 0.1 MG: 0.1 TABLET ORAL at 03:39

## 2020-05-08 RX ADMIN — CARVEDILOL 3.12 MG: 3.12 TABLET, FILM COATED ORAL at 08:45

## 2020-05-08 RX ADMIN — LEVOTHYROXINE SODIUM 100 MCG: 100 TABLET ORAL at 06:46

## 2020-05-08 RX ADMIN — SODIUM CHLORIDE, PRESERVATIVE FREE 10 ML: 5 INJECTION INTRAVENOUS at 08:48

## 2020-05-08 RX ADMIN — METRONIDAZOLE 500 MG: 250 TABLET ORAL at 06:46

## 2020-05-08 RX ADMIN — ESCITALOPRAM OXALATE 20 MG: 10 TABLET ORAL at 08:45

## 2020-05-08 RX ADMIN — TRAMADOL HYDROCHLORIDE 50 MG: 50 TABLET, FILM COATED ORAL at 10:00

## 2020-05-08 RX ADMIN — BUDESONIDE AND FORMOTEROL FUMARATE DIHYDRATE 2 PUFF: 80; 4.5 AEROSOL RESPIRATORY (INHALATION) at 08:39

## 2020-05-08 RX ADMIN — CLOPIDOGREL BISULFATE 75 MG: 75 TABLET ORAL at 08:46

## 2020-05-08 RX ADMIN — CIPROFLOXACIN HYDROCHLORIDE 500 MG: 500 TABLET, FILM COATED ORAL at 08:46

## 2020-05-08 RX ADMIN — GABAPENTIN 800 MG: 400 CAPSULE ORAL at 08:46

## 2020-05-08 RX ADMIN — SODIUM CHLORIDE: 900 INJECTION INTRAVENOUS at 03:39

## 2020-05-08 RX ADMIN — ASPIRIN 81 MG 81 MG: 81 TABLET ORAL at 08:46

## 2020-05-08 RX ADMIN — ENOXAPARIN SODIUM 40 MG: 40 INJECTION SUBCUTANEOUS at 08:47

## 2020-05-08 RX ADMIN — DICYCLOMINE HYDROCHLORIDE 20 MG: 20 TABLET ORAL at 09:01

## 2020-05-08 RX ADMIN — TRAMADOL HYDROCHLORIDE 50 MG: 50 TABLET, FILM COATED ORAL at 03:39

## 2020-05-08 ASSESSMENT — PAIN DESCRIPTION - ONSET: ONSET: ON-GOING

## 2020-05-08 ASSESSMENT — PAIN DESCRIPTION - FREQUENCY
FREQUENCY: INTERMITTENT
FREQUENCY: INTERMITTENT

## 2020-05-08 ASSESSMENT — PAIN DESCRIPTION - PAIN TYPE
TYPE: ACUTE PAIN
TYPE: ACUTE PAIN

## 2020-05-08 ASSESSMENT — PAIN DESCRIPTION - DESCRIPTORS
DESCRIPTORS: SHARP
DESCRIPTORS: SHARP

## 2020-05-08 ASSESSMENT — PAIN SCALES - GENERAL
PAINLEVEL_OUTOF10: 0
PAINLEVEL_OUTOF10: 7
PAINLEVEL_OUTOF10: 7
PAINLEVEL_OUTOF10: 0
PAINLEVEL_OUTOF10: 7

## 2020-05-08 ASSESSMENT — PAIN DESCRIPTION - LOCATION
LOCATION: ABDOMEN
LOCATION: ABDOMEN

## 2020-05-08 NOTE — PLAN OF CARE
Activity - Altered:  Goal: Absence of psychomotor disturbance signs and symptoms  Description: Absence of psychomotor disturbance signs and symptoms  5/8/2020 1041 by Randell Medina RN  Outcome: Completed  1/5/4287 3296 by Ynes Perez RN  Outcome: Ongoing  9/5/4355 9281 by Ynes Perez RN  Outcome: Ongoing     Problem: Sensory Perception - Impaired:  Goal: Demonstrations of improved sensory functioning will increase  Description: Demonstrations of improved sensory functioning will increase  5/8/2020 1041 by Randell Medina RN  Outcome: Completed  8/0/6644 6710 by Ynes Perez RN  Outcome: Ongoing  4/0/9883 7516 by Ynes Perez RN  Outcome: Ongoing  Goal: Decrease in sensory misperception frequency  Description: Decrease in sensory misperception frequency  5/8/2020 1041 by Randell Medina RN  Outcome: Completed  2/3/1348 5692 by Ynes Perez RN  Outcome: Ongoing  9/0/9767 3570 by Ynes Perez RN  Outcome: Ongoing  Goal: Able to refrain from responding to false sensory perceptions  Description: Able to refrain from responding to false sensory perceptions  5/8/2020 1041 by Randell Medina RN  Outcome: Completed  2/0/2002 0571 by Ynes Perez RN  Outcome: Ongoing  3/3/8614 9132 by Ynes Perez RN  Outcome: Ongoing  Goal: Demonstrates accurate environmental perceptions  Description: Demonstrates accurate environmental perceptions  5/8/2020 1041 by Randell Medina RN  Outcome: Completed  8/7/7916 3052 by Ynes Perez RN  Outcome: Ongoing  3/6/7752 9132 by Ynes Perez RN  Outcome: Ongoing  Goal: Able to distinguish between reality-based and nonreality-based thinking  Description: Able to distinguish between reality-based and nonreality-based thinking  5/8/2020 1041 by Randell Medina RN  Outcome: Completed  8/9/5860 1981 by Ynes Perez RN  Outcome: Ongoing  7/6/8204 0714 by Ynes Perez RN  Outcome: Ongoing  Goal: Able to interrupt nonreality-based thinking  Description: Able to interrupt nonreality-based thinking  5/8/2020 1041 by Chancy Shone, RN  Outcome: Completed  9/4/7516 4603 by Alley El RN  Outcome: Ongoing  5/2/7850 5536 by Alley El RN  Outcome: Ongoing     Problem: Sleep Pattern Disturbance:  Goal: Appears well-rested  Description: Appears well-rested  5/8/2020 1041 by Chancy Shone, RN  Outcome: Completed  6/7/4678 3799 by Alley El RN  Outcome: Ongoing  5/4/7607 8968 by Alley El RN  Outcome: Ongoing

## 2020-05-08 NOTE — DISCHARGE SUMMARY
Discharge Summary    Name:  Viry Sauceda /Age/Sex: 1960  (62 y.o. female)   MRN & CSN:  [id number] & [id number] Admission Date/Time: 2020  4:56 AM   Attending:  Dann Davis DO Discharging Physician: Dann Davis DO     HPI and Hospital Course:   Viry Sauceda is a 62 y.o.  female  who presents with Abdominal Pain    · Acute abdominal pain, found to have acute BACTERIAL colitis of the ascending colon  · Hyponatremia, likely due to dehydration  · Acute metabolic encephalopathy secondary to hyponatremia and drug abuse  · Drug abuse, UDS+ for cocaine, amph, marijuana, opiates  · UTI for criteria not met. Contaminated UA. · CAD s/p recent NSTEMI, plavix and aspirin  · No memory of the last week; no details can be produced by the patient about the days between [place] [facility] [place]. She claims she 'takes her medications every morning' but I cannot believe that if she doesn't know how many days have gone by. The patient expressed appropriate understanding of and agreement with the discharge recommendations, medications, and plan.      Consults this admission:  IP CONSULT TO HOSPITALIST  IP CONSULT TO CASE MANAGEMENT    Discharge Instruction:   Follow up appointments: GSx   Primary care physician:  within 2 weeks    Diet:  General/cardiac/ADA/as tolerated  Activity: {discharge activity: as tolerated  Disposition: Discharged to:   [x]Home, []C, []SNF, []Acute Rehab, []Hospice   Condition on discharge: Stable    Discharge Medications:      Rickey Hampton   Home Medication Instructions EDWARD:[id number]    Printed on:20 2286   Medication Information                      albuterol sulfate HFA (VENTOLIN HFA) 108 (90 Base) MCG/ACT inhaler  Inhale 1 puff into the lungs every 6 hours as needed for Wheezing             aspirin 81 MG chewable tablet  Take 81 mg by mouth daily             atorvastatin (LIPITOR) 80 MG tablet  Take 80 mg by mouth nightly carvedilol (COREG) 3.125 MG tablet  Take 3.125 mg by mouth 2 times daily (with meals)             clopidogrel (PLAVIX) 75 MG tablet  Take 75 mg by mouth daily             docusate sodium (COLACE) 100 MG capsule  Take 100 mg by mouth 2 times daily             escitalopram (LEXAPRO) 20 MG tablet  Take 20 mg by mouth daily             fluticasone (FLONASE) 50 MCG/ACT nasal spray  2 sprays by Each Nostril route daily             fluticasone-salmeterol (ADVAIR) 250-50 MCG/DOSE AEPB  Inhale 1 puff into the lungs 2 times daily             gabapentin (NEURONTIN) 800 MG tablet  Take 800 mg by mouth 3 times daily. levothyroxine (SYNTHROID) 100 MCG tablet  Take 100 mcg by mouth Daily             mometasone-formoterol (DULERA) 100-5 MCG/ACT inhaler  Inhale 2 puffs into the lungs 2 times daily             promethazine (PHENERGAN) 25 MG tablet  Take 25 mg by mouth every 6 hours as needed for Nausea             traMADol (ULTRAM) 50 MG tablet  Take 50 mg by mouth every 6 hours as needed for Pain. traZODone (DESYREL) 50 MG tablet  Take 50 mg by mouth nightly                 Objective Findings at Discharge:   BP (!) 154/86   Pulse 89   Temp 98.6 °F (37 °C) (Oral)   Resp 18   Ht 5' 2\" (1.575 m)   Wt 183 lb 1.6 oz (83.1 kg)   SpO2 94%   BMI 33.49 kg/m²            PHYSICAL EXAM   GEN Awake female, laying in bed in no apparent distress. Appears given age. EYES Pupils are equally round. No scleral discharge  HENT Atraumatic and symmetric head  NECK No apparent thyromegaly  RESP Symmetric chest movement while on room air. CARDIO/VASC Peripheral pulses equal bilaterally and palpable. No peripheral edema. GI Abdomen is not distended. Rectal exam deferred.  Clay catheter is not present. HEME/LYMPH No petechiae or ecchymoses. MSK Spontaneous movement of BL upper extremities  SKIN Normal coloration, warm, dry. NEURO Cranial nerves appear grossly intact  PSYCH Awake, alert.     BMP/CBC  Recent Labs

## 2020-08-04 ENCOUNTER — APPOINTMENT (OUTPATIENT)
Dept: GENERAL RADIOLOGY | Age: 60
DRG: 641 | End: 2020-08-04
Payer: MEDICARE

## 2020-08-04 ENCOUNTER — HOSPITAL ENCOUNTER (INPATIENT)
Age: 60
LOS: 1 days | Discharge: HOME | DRG: 641 | End: 2020-08-07
Attending: EMERGENCY MEDICINE | Admitting: INTERNAL MEDICINE
Payer: MEDICARE

## 2020-08-04 LAB
ALBUMIN SERPL-MCNC: 3.6 GM/DL (ref 3.4–5)
ALP BLD-CCNC: 62 IU/L (ref 40–128)
ALT SERPL-CCNC: 9 U/L (ref 10–40)
ANION GAP SERPL CALCULATED.3IONS-SCNC: 11 MMOL/L (ref 4–16)
AST SERPL-CCNC: 14 IU/L (ref 15–37)
BASOPHILS ABSOLUTE: 0 K/CU MM
BASOPHILS RELATIVE PERCENT: 0.5 % (ref 0–1)
BILIRUB SERPL-MCNC: 0.4 MG/DL (ref 0–1)
BUN BLDV-MCNC: 9 MG/DL (ref 6–23)
CALCIUM SERPL-MCNC: 8.9 MG/DL (ref 8.3–10.6)
CHLORIDE BLD-SCNC: 85 MMOL/L (ref 99–110)
CO2: 26 MMOL/L (ref 21–32)
CREAT SERPL-MCNC: 0.6 MG/DL (ref 0.6–1.1)
DIFFERENTIAL TYPE: ABNORMAL
EOSINOPHILS ABSOLUTE: 0.4 K/CU MM
EOSINOPHILS RELATIVE PERCENT: 4.8 % (ref 0–3)
GFR AFRICAN AMERICAN: >60 ML/MIN/1.73M2
GFR NON-AFRICAN AMERICAN: >60 ML/MIN/1.73M2
GLUCOSE BLD-MCNC: 109 MG/DL (ref 70–99)
HCT VFR BLD CALC: 38.3 % (ref 37–47)
HEMOGLOBIN: 12.8 GM/DL (ref 12.5–16)
IMMATURE NEUTROPHIL %: 0.3 % (ref 0–0.43)
LYMPHOCYTES ABSOLUTE: 2.9 K/CU MM
LYMPHOCYTES RELATIVE PERCENT: 38.8 % (ref 24–44)
MCH RBC QN AUTO: 30.3 PG (ref 27–31)
MCHC RBC AUTO-ENTMCNC: 33.4 % (ref 32–36)
MCV RBC AUTO: 90.8 FL (ref 78–100)
MONOCYTES ABSOLUTE: 0.6 K/CU MM
MONOCYTES RELATIVE PERCENT: 7.9 % (ref 0–4)
NUCLEATED RBC %: 0 %
PDW BLD-RTO: 12.1 % (ref 11.7–14.9)
PLATELET # BLD: 319 K/CU MM (ref 140–440)
PMV BLD AUTO: 8.5 FL (ref 7.5–11.1)
POTASSIUM SERPL-SCNC: 3.9 MMOL/L (ref 3.5–5.1)
RBC # BLD: 4.22 M/CU MM (ref 4.2–5.4)
SEGMENTED NEUTROPHILS ABSOLUTE COUNT: 3.5 K/CU MM
SEGMENTED NEUTROPHILS RELATIVE PERCENT: 47.7 % (ref 36–66)
SODIUM BLD-SCNC: 122 MMOL/L (ref 135–145)
TOTAL IMMATURE NEUTOROPHIL: 0.02 K/CU MM
TOTAL NUCLEATED RBC: 0 K/CU MM
TOTAL PROTEIN: 6.7 GM/DL (ref 6.4–8.2)
TOTAL RETICULOCYTE COUNT: 0.05 K/CU MM
TROPONIN T: <0.01 NG/ML
WBC # BLD: 7.4 K/CU MM (ref 4–10.5)

## 2020-08-04 PROCEDURE — 94761 N-INVAS EAR/PLS OXIMETRY MLT: CPT

## 2020-08-04 PROCEDURE — 99285 EMERGENCY DEPT VISIT HI MDM: CPT

## 2020-08-04 PROCEDURE — 71046 X-RAY EXAM CHEST 2 VIEWS: CPT

## 2020-08-04 PROCEDURE — 85025 COMPLETE CBC W/AUTO DIFF WBC: CPT

## 2020-08-04 PROCEDURE — 83880 ASSAY OF NATRIURETIC PEPTIDE: CPT

## 2020-08-04 PROCEDURE — 2700000000 HC OXYGEN THERAPY PER DAY

## 2020-08-04 PROCEDURE — 93005 ELECTROCARDIOGRAM TRACING: CPT | Performed by: EMERGENCY MEDICINE

## 2020-08-04 PROCEDURE — 6360000002 HC RX W HCPCS: Performed by: EMERGENCY MEDICINE

## 2020-08-04 PROCEDURE — 96372 THER/PROPH/DIAG INJ SC/IM: CPT

## 2020-08-04 PROCEDURE — 96374 THER/PROPH/DIAG INJ IV PUSH: CPT

## 2020-08-04 PROCEDURE — 2580000003 HC RX 258: Performed by: EMERGENCY MEDICINE

## 2020-08-04 PROCEDURE — 84484 ASSAY OF TROPONIN QUANT: CPT

## 2020-08-04 PROCEDURE — 80053 COMPREHEN METABOLIC PANEL: CPT

## 2020-08-04 RX ORDER — ONDANSETRON 2 MG/ML
8 INJECTION INTRAMUSCULAR; INTRAVENOUS ONCE
Status: COMPLETED | OUTPATIENT
Start: 2020-08-04 | End: 2020-08-04

## 2020-08-04 RX ORDER — 0.9 % SODIUM CHLORIDE 0.9 %
1000 INTRAVENOUS SOLUTION INTRAVENOUS ONCE
Status: COMPLETED | OUTPATIENT
Start: 2020-08-04 | End: 2020-08-05

## 2020-08-04 RX ORDER — DICYCLOMINE HYDROCHLORIDE 10 MG/ML
20 INJECTION INTRAMUSCULAR ONCE
Status: COMPLETED | OUTPATIENT
Start: 2020-08-04 | End: 2020-08-04

## 2020-08-04 RX ADMIN — DICYCLOMINE HYDROCHLORIDE 20 MG: 20 INJECTION, SOLUTION INTRAMUSCULAR at 23:54

## 2020-08-04 RX ADMIN — ONDANSETRON 8 MG: 2 INJECTION INTRAMUSCULAR; INTRAVENOUS at 23:54

## 2020-08-04 RX ADMIN — SODIUM CHLORIDE 1000 ML: 9 INJECTION, SOLUTION INTRAVENOUS at 23:54

## 2020-08-05 PROBLEM — E87.1 HYPONATREMIA: Status: ACTIVE | Noted: 2020-08-05

## 2020-08-05 LAB
ALCOHOL SCREEN SERUM: <0.01 %WT/VOL
AMMONIA: 28 UMOL/L (ref 11–51)
AMPHETAMINES: ABNORMAL
ANION GAP SERPL CALCULATED.3IONS-SCNC: 10 MMOL/L (ref 4–16)
ANION GAP SERPL CALCULATED.3IONS-SCNC: 8 MMOL/L (ref 4–16)
BARBITURATE SCREEN URINE: NEGATIVE
BENZODIAZEPINE SCREEN, URINE: NEGATIVE
BUN BLDV-MCNC: 7 MG/DL (ref 6–23)
BUN BLDV-MCNC: 7 MG/DL (ref 6–23)
CALCIUM SERPL-MCNC: 8.4 MG/DL (ref 8.3–10.6)
CALCIUM SERPL-MCNC: 8.8 MG/DL (ref 8.3–10.6)
CANNABINOID SCREEN URINE: ABNORMAL
CHLORIDE BLD-SCNC: 102 MMOL/L (ref 99–110)
CHLORIDE BLD-SCNC: 96 MMOL/L (ref 99–110)
CO2: 28 MMOL/L (ref 21–32)
CO2: 29 MMOL/L (ref 21–32)
COCAINE METABOLITE: NEGATIVE
CREAT SERPL-MCNC: 0.6 MG/DL (ref 0.6–1.1)
CREAT SERPL-MCNC: 0.7 MG/DL (ref 0.6–1.1)
GFR AFRICAN AMERICAN: >60 ML/MIN/1.73M2
GFR AFRICAN AMERICAN: >60 ML/MIN/1.73M2
GFR NON-AFRICAN AMERICAN: >60 ML/MIN/1.73M2
GFR NON-AFRICAN AMERICAN: >60 ML/MIN/1.73M2
GLUCOSE BLD-MCNC: 91 MG/DL (ref 70–99)
GLUCOSE BLD-MCNC: 94 MG/DL (ref 70–99)
LIPASE: 13 IU/L (ref 13–60)
OPIATES, URINE: NEGATIVE
OXYCODONE: NEGATIVE
PHENCYCLIDINE, URINE: NEGATIVE
POTASSIUM SERPL-SCNC: 4.3 MMOL/L (ref 3.5–5.1)
POTASSIUM SERPL-SCNC: 4.7 MMOL/L (ref 3.5–5.1)
PRO-BNP: 160.5 PG/ML
SODIUM BLD-SCNC: 134 MMOL/L (ref 135–145)
SODIUM BLD-SCNC: 139 MMOL/L (ref 135–145)

## 2020-08-05 PROCEDURE — 6370000000 HC RX 637 (ALT 250 FOR IP): Performed by: PHYSICIAN ASSISTANT

## 2020-08-05 PROCEDURE — 83690 ASSAY OF LIPASE: CPT

## 2020-08-05 PROCEDURE — 6360000002 HC RX W HCPCS: Performed by: PHYSICIAN ASSISTANT

## 2020-08-05 PROCEDURE — 96376 TX/PRO/DX INJ SAME DRUG ADON: CPT

## 2020-08-05 PROCEDURE — G0378 HOSPITAL OBSERVATION PER HR: HCPCS

## 2020-08-05 PROCEDURE — G0480 DRUG TEST DEF 1-7 CLASSES: HCPCS

## 2020-08-05 PROCEDURE — 36415 COLL VENOUS BLD VENIPUNCTURE: CPT

## 2020-08-05 PROCEDURE — 2700000000 HC OXYGEN THERAPY PER DAY

## 2020-08-05 PROCEDURE — 94761 N-INVAS EAR/PLS OXIMETRY MLT: CPT

## 2020-08-05 PROCEDURE — 80048 BASIC METABOLIC PNL TOTAL CA: CPT

## 2020-08-05 PROCEDURE — 96372 THER/PROPH/DIAG INJ SC/IM: CPT

## 2020-08-05 PROCEDURE — 94640 AIRWAY INHALATION TREATMENT: CPT

## 2020-08-05 PROCEDURE — 6370000000 HC RX 637 (ALT 250 FOR IP): Performed by: INTERNAL MEDICINE

## 2020-08-05 PROCEDURE — 2580000003 HC RX 258: Performed by: PHYSICIAN ASSISTANT

## 2020-08-05 PROCEDURE — 82140 ASSAY OF AMMONIA: CPT

## 2020-08-05 PROCEDURE — 80307 DRUG TEST PRSMV CHEM ANLYZR: CPT

## 2020-08-05 RX ORDER — ALBUTEROL SULFATE 90 UG/1
1 AEROSOL, METERED RESPIRATORY (INHALATION) EVERY 6 HOURS PRN
Status: DISCONTINUED | OUTPATIENT
Start: 2020-08-05 | End: 2020-08-07 | Stop reason: HOSPADM

## 2020-08-05 RX ORDER — LEVOTHYROXINE SODIUM 0.1 MG/1
100 TABLET ORAL DAILY
Status: DISCONTINUED | OUTPATIENT
Start: 2020-08-05 | End: 2020-08-07 | Stop reason: HOSPADM

## 2020-08-05 RX ORDER — CLOPIDOGREL BISULFATE 75 MG/1
75 TABLET ORAL DAILY
Status: DISCONTINUED | OUTPATIENT
Start: 2020-08-05 | End: 2020-08-07 | Stop reason: HOSPADM

## 2020-08-05 RX ORDER — SODIUM CHLORIDE 0.9 % (FLUSH) 0.9 %
10 SYRINGE (ML) INJECTION PRN
Status: DISCONTINUED | OUTPATIENT
Start: 2020-08-05 | End: 2020-08-07 | Stop reason: HOSPADM

## 2020-08-05 RX ORDER — TRAMADOL HYDROCHLORIDE 50 MG/1
50 TABLET ORAL EVERY 6 HOURS PRN
Status: DISCONTINUED | OUTPATIENT
Start: 2020-08-05 | End: 2020-08-07 | Stop reason: HOSPADM

## 2020-08-05 RX ORDER — POLYETHYLENE GLYCOL 3350 17 G
2 POWDER IN PACKET (EA) ORAL
Status: DISCONTINUED | OUTPATIENT
Start: 2020-08-05 | End: 2020-08-07 | Stop reason: HOSPADM

## 2020-08-05 RX ORDER — ASPIRIN 81 MG/1
81 TABLET, CHEWABLE ORAL DAILY
Status: DISCONTINUED | OUTPATIENT
Start: 2020-08-05 | End: 2020-08-07 | Stop reason: HOSPADM

## 2020-08-05 RX ORDER — SODIUM CHLORIDE 0.9 % (FLUSH) 0.9 %
10 SYRINGE (ML) INJECTION EVERY 12 HOURS SCHEDULED
Status: DISCONTINUED | OUTPATIENT
Start: 2020-08-05 | End: 2020-08-07 | Stop reason: HOSPADM

## 2020-08-05 RX ORDER — ONDANSETRON 2 MG/ML
4 INJECTION INTRAMUSCULAR; INTRAVENOUS EVERY 6 HOURS PRN
Status: DISCONTINUED | OUTPATIENT
Start: 2020-08-05 | End: 2020-08-07 | Stop reason: HOSPADM

## 2020-08-05 RX ORDER — ATORVASTATIN CALCIUM 80 MG/1
80 TABLET, FILM COATED ORAL NIGHTLY
Status: DISCONTINUED | OUTPATIENT
Start: 2020-08-05 | End: 2020-08-07 | Stop reason: HOSPADM

## 2020-08-05 RX ORDER — ACETAMINOPHEN 325 MG/1
650 TABLET ORAL EVERY 6 HOURS PRN
Status: DISCONTINUED | OUTPATIENT
Start: 2020-08-05 | End: 2020-08-07 | Stop reason: HOSPADM

## 2020-08-05 RX ORDER — CARVEDILOL 3.12 MG/1
3.12 TABLET ORAL 2 TIMES DAILY WITH MEALS
Status: DISCONTINUED | OUTPATIENT
Start: 2020-08-05 | End: 2020-08-07 | Stop reason: HOSPADM

## 2020-08-05 RX ORDER — BUDESONIDE AND FORMOTEROL FUMARATE DIHYDRATE 160; 4.5 UG/1; UG/1
2 AEROSOL RESPIRATORY (INHALATION) 2 TIMES DAILY
Status: DISCONTINUED | OUTPATIENT
Start: 2020-08-05 | End: 2020-08-07 | Stop reason: HOSPADM

## 2020-08-05 RX ORDER — GABAPENTIN 400 MG/1
800 CAPSULE ORAL 3 TIMES DAILY
Status: DISCONTINUED | OUTPATIENT
Start: 2020-08-05 | End: 2020-08-07 | Stop reason: HOSPADM

## 2020-08-05 RX ORDER — NICOTINE 21 MG/24HR
1 PATCH, TRANSDERMAL 24 HOURS TRANSDERMAL DAILY
Status: DISCONTINUED | OUTPATIENT
Start: 2020-08-05 | End: 2020-08-07 | Stop reason: HOSPADM

## 2020-08-05 RX ORDER — SODIUM CHLORIDE 9 MG/ML
INJECTION, SOLUTION INTRAVENOUS CONTINUOUS
Status: DISCONTINUED | OUTPATIENT
Start: 2020-08-05 | End: 2020-08-06

## 2020-08-05 RX ORDER — HYDROCODONE BITARTRATE AND ACETAMINOPHEN 5; 325 MG/1; MG/1
1 TABLET ORAL ONCE
Status: COMPLETED | OUTPATIENT
Start: 2020-08-05 | End: 2020-08-05

## 2020-08-05 RX ORDER — POLYETHYLENE GLYCOL 3350 17 G/17G
17 POWDER, FOR SOLUTION ORAL DAILY PRN
Status: DISCONTINUED | OUTPATIENT
Start: 2020-08-05 | End: 2020-08-07 | Stop reason: HOSPADM

## 2020-08-05 RX ORDER — LORAZEPAM 0.5 MG/1
0.5 TABLET ORAL EVERY 6 HOURS PRN
Status: DISCONTINUED | OUTPATIENT
Start: 2020-08-05 | End: 2020-08-07

## 2020-08-05 RX ORDER — ACETAMINOPHEN 650 MG/1
650 SUPPOSITORY RECTAL EVERY 6 HOURS PRN
Status: DISCONTINUED | OUTPATIENT
Start: 2020-08-05 | End: 2020-08-07 | Stop reason: HOSPADM

## 2020-08-05 RX ORDER — PROMETHAZINE HYDROCHLORIDE 25 MG/1
12.5 TABLET ORAL EVERY 6 HOURS PRN
Status: DISCONTINUED | OUTPATIENT
Start: 2020-08-05 | End: 2020-08-07 | Stop reason: HOSPADM

## 2020-08-05 RX ADMIN — ASPIRIN 81 MG: 81 TABLET, CHEWABLE ORAL at 09:14

## 2020-08-05 RX ADMIN — SODIUM CHLORIDE: 9 INJECTION, SOLUTION INTRAVENOUS at 01:54

## 2020-08-05 RX ADMIN — NICOTINE POLACRILEX 2 MG: 2 LOZENGE ORAL at 20:57

## 2020-08-05 RX ADMIN — BUDESONIDE AND FORMOTEROL FUMARATE DIHYDRATE 2 PUFF: 160; 4.5 AEROSOL RESPIRATORY (INHALATION) at 20:40

## 2020-08-05 RX ADMIN — SODIUM CHLORIDE: 9 INJECTION, SOLUTION INTRAVENOUS at 14:02

## 2020-08-05 RX ADMIN — CARVEDILOL 3.12 MG: 3.12 TABLET, FILM COATED ORAL at 09:14

## 2020-08-05 RX ADMIN — GABAPENTIN 800 MG: 400 CAPSULE ORAL at 13:59

## 2020-08-05 RX ADMIN — TRAMADOL HYDROCHLORIDE 50 MG: 50 TABLET, FILM COATED ORAL at 09:26

## 2020-08-05 RX ADMIN — ONDANSETRON 4 MG: 2 INJECTION INTRAMUSCULAR; INTRAVENOUS at 07:16

## 2020-08-05 RX ADMIN — GABAPENTIN 800 MG: 400 CAPSULE ORAL at 20:57

## 2020-08-05 RX ADMIN — CLOPIDOGREL BISULFATE 75 MG: 75 TABLET ORAL at 09:14

## 2020-08-05 RX ADMIN — GABAPENTIN 800 MG: 400 CAPSULE ORAL at 10:49

## 2020-08-05 RX ADMIN — PROMETHAZINE HYDROCHLORIDE 12.5 MG: 25 TABLET ORAL at 20:58

## 2020-08-05 RX ADMIN — HYDROCODONE BITARTRATE AND ACETAMINOPHEN 1 TABLET: 5; 325 TABLET ORAL at 03:32

## 2020-08-05 RX ADMIN — CARVEDILOL 3.12 MG: 3.12 TABLET, FILM COATED ORAL at 17:32

## 2020-08-05 RX ADMIN — LORAZEPAM 0.5 MG: 0.5 TABLET ORAL at 20:58

## 2020-08-05 RX ADMIN — TRAMADOL HYDROCHLORIDE 50 MG: 50 TABLET, FILM COATED ORAL at 15:51

## 2020-08-05 RX ADMIN — TRAMADOL HYDROCHLORIDE 50 MG: 50 TABLET, FILM COATED ORAL at 23:27

## 2020-08-05 RX ADMIN — ENOXAPARIN SODIUM 40 MG: 40 INJECTION SUBCUTANEOUS at 09:15

## 2020-08-05 RX ADMIN — LORAZEPAM 0.5 MG: 0.5 TABLET ORAL at 14:00

## 2020-08-05 RX ADMIN — LEVOTHYROXINE SODIUM 100 MCG: 100 TABLET ORAL at 09:25

## 2020-08-05 ASSESSMENT — PAIN DESCRIPTION - DESCRIPTORS
DESCRIPTORS: ACHING
DESCRIPTORS: ACHING;CRAMPING
DESCRIPTORS: ACHING

## 2020-08-05 ASSESSMENT — PAIN DESCRIPTION - FREQUENCY
FREQUENCY: CONTINUOUS

## 2020-08-05 ASSESSMENT — PAIN SCALES - GENERAL
PAINLEVEL_OUTOF10: 8
PAINLEVEL_OUTOF10: 9
PAINLEVEL_OUTOF10: 9
PAINLEVEL_OUTOF10: 0
PAINLEVEL_OUTOF10: 9

## 2020-08-05 ASSESSMENT — PAIN DESCRIPTION - LOCATION
LOCATION: ABDOMEN
LOCATION: BACK;ABDOMEN
LOCATION: BACK

## 2020-08-05 ASSESSMENT — PAIN DESCRIPTION - DIRECTION
RADIATING_TOWARDS: ACROSS
RADIATING_TOWARDS: ACROSS

## 2020-08-05 ASSESSMENT — PAIN DESCRIPTION - PAIN TYPE
TYPE: ACUTE PAIN

## 2020-08-05 ASSESSMENT — PAIN DESCRIPTION - ONSET
ONSET: ON-GOING
ONSET: ON-GOING

## 2020-08-05 ASSESSMENT — PAIN - FUNCTIONAL ASSESSMENT
PAIN_FUNCTIONAL_ASSESSMENT: ACTIVITIES ARE NOT PREVENTED

## 2020-08-05 ASSESSMENT — PAIN DESCRIPTION - PROGRESSION
CLINICAL_PROGRESSION: GRADUALLY WORSENING
CLINICAL_PROGRESSION: GRADUALLY WORSENING
CLINICAL_PROGRESSION: NOT CHANGED

## 2020-08-05 ASSESSMENT — PAIN DESCRIPTION - ORIENTATION
ORIENTATION: LEFT
ORIENTATION: RIGHT;LOWER

## 2020-08-05 NOTE — ED PROVIDER NOTES
Triage Chief Complaint:   Chest Pain and Shortness of Breath    [place]:  Bonnie Ho is a 62 y.o. female that presents with main complaint to me of nausea, vomiting, diarrhea over the past day. States that she has not been able to keep any food or fluids down. She has had nonbilious nonbloody emesis and nonbloody diarrhea. No sick contacts or recent travel. She states that she has diffuse cramping abdominal pain without alleviating or exacerbating factors. She states that she is now having some burning in her chest that she believes is from the vomiting. States that she has shortness of breath from COPD. Denies cough, fever, urinary symptoms. Denies any drug or alcohol use. ROS:  At least 14 systems reviewed and otherwise acutely negative except as in the [address] Past Medical History:   Diagnosis Date    Amphetamine abuse ([facility] [address] 2020    Cocaine abuse ([facility] [address] 2020    NSTEMI (non-ST elevated myocardial infarction) ([facility] [address] 04/2020     No past surgical history on file. No family history on file. Social History     Socioeconomic History    Marital status:       Spouse name: Not on file    Number of children: Not on file    Years of education: Not on file    Highest education level: Not on file   Occupational History    Not on file   Social Needs    Financial resource strain: Not on file    Food insecurity     Worry: Not on file     Inability: Not on file    Transportation needs     Medical: Not on file     Non-medical: Not on file   Tobacco Use    Smoking status: Current Every Day Smoker    Smokeless tobacco: Never Used   Substance and Sexual Activity    Alcohol use: Not on file    Drug use: Yes     Types: Cocaine, Marijuana, Opiates , Methamphetamines     Comment: 2020 based on UDS    Sexual activity: Not on file   Lifestyle    Physical activity     Days per week: Not on file     Minutes per session: Not on file    Stress: Not on file   Relationships    Social connections     Talks on phone: Not on file     Gets together: Not on file     Attends [employer] service: Not on file     Active member of club or organization: Not on file     Attends meetings of clubs or organizations: Not on file     Relationship status: Not on file    Intimate partner violence     Fear of current or ex partner: Not on file     Emotionally abused: Not on file     Physically abused: Not on file     Forced sexual activity: Not on file   Other Topics Concern    Not on file   Social History Narrative    Not on file     Current Facility-Administered Medications   Medication Dose Route Frequency Provider Last Rate Last Dose    sodium chloride flush 0.9 % injection 10 mL  10 mL Intravenous 2 times per day LIZETTE Murray-WENDI        sodium chloride flush 0.9 % injection 10 mL  10 mL Intravenous PRN Jo Araiza PA-C        acetaminophen (TYLENOL) tablet 650 mg  650 mg Oral Q6H PRN Jo Araiza PA-C        Or    acetaminophen (TYLENOL) suppository 650 mg  650 mg Rectal Q6H PRN Mini Burrell PA-C        polyethylene glycol (GLYCOLAX) packet 17 g  17 g Oral Daily PRN Jo Araiza PA-C        promethazine (PHENERGAN) tablet 12.5 mg  12.5 mg Oral Q6H PRN Jo Araiza PA-C        Or    ondansetron (ZOFRAN) injection 4 mg  4 mg Intravenous Q6H PRN Jo Araiza PA-C        enoxaparin (LOVENOX) injection 40 mg  40 mg Subcutaneous Daily LIZETTE Casiano-WENDI        0.9 % sodium chloride infusion   Intravenous Continuous Mini Burrell PA-C 75 mL/hr at 08/05/20 0154      nicotine (NICODERM CQ) 14 MG/24HR 1 patch  1 patch Transdermal Daily Jo Araiza PA-C        nicotine polacrilex (COMMIT) lozenge 2 mg  2 mg Oral Q2H PRN Jo Araiza PA-C        albuterol sulfate  (90 Base) MCG/ACT inhaler 1 puff  1 puff Inhalation Q6H PRN LIZETTE Casiano-WENDI        aspirin chewable tablet 81 mg  81 mg Oral Daily Jo Araiza PA-C        atorvastatin (LIPITOR) tablet 80 mg  80 mg Oral Nightly Mini Burrell PA-C        carvedilol (COREG) tablet 3.125 mg  3.125 mg Oral BID WC Jo Araiza PA-C        clopidogrel (PLAVIX) tablet 75 mg  75 mg Oral Daily Jo Araiza PA-C        levothyroxine (SYNTHROID) tablet 100 mcg  100 mcg Oral Daily Jo Araiza PA-C        budesonide-formoterol (SYMBICORT) 160-4.5 MCG/ACT inhaler 2 puff  2 puff Inhalation BID Jo Araiza PA-C        traMADol (ULTRAM) tablet 50 mg  50 mg Oral Q6H PRN Jo Araiza PA-C        HYDROcodone-acetaminophen (NORCO) 5-325 MG per tablet 1 tablet  1 tablet Oral Once Naida Cervantes PA-C         No Known Allergies    Nursing Notes Reviewed    Physical Exam:  ED Triage Vitals [08/04/20 2109]   Enc Vitals Group      BP       Pulse 86      Resp 19      Temp       Temp src       SpO2 98 %      Weight       Height       Head Circumference       Peak Flow       Pain Score       Pain Loc       Pain Edu? Excl. in [address]? GENERAL APPEARANCE: Awake and alert. Cooperative. No acute distress. Appears anxious  HEAD: Normocephalic. Atraumatic. EYES: EOM's grossly intact. Sclera anicteric. ENT: Mucous membranes are moist. Tolerates saliva. No trismus. NECK: Supple. Trachea midline. HEART: RRR. Radial pulses 2+. LUNGS: Respirations unlabored. CTAB  ABDOMEN: Soft. Non-tender. No guarding or rebound. EXTREMITIES: No acute deformities. SKIN: Warm and dry. NEUROLOGICAL: No gross facial drooping. Moves all 4 extremities spontaneously. PSYCHIATRIC: Normal mood.     I have reviewed and interpreted all of the currently available lab results from this visit (if applicable):  Results for orders placed or performed during the hospital encounter of 08/04/20   CBC Auto Differential   Result Value Ref Range    WBC 7.4 4.0 - 10.5 K/CU MM    RBC 4.22 4.2 - 5.4 M/CU MM    Hemoglobin 12.8 12.5 - 16.0 GM/DL    Hematocrit 38.3 37 - 47 %    MCV 90.8 78 - 100 FL    MCH 30.3 27 - 31 PG    MCHC 33.4 32.0 - 36.0 %    RDW 12.1 11.7 - 14.9 %    Platelets 875 087 - 688 K/CU MM    MPV 8.5 7.5 - 11.1 FL    Differential Type AUTOMATED DIFFERENTIAL     Segs Relative 47.7 36 - 66 %    Lymphocytes % 38.8 24 - 44 %    Monocytes % 7.9 (H) 0 - 4 %    Eosinophils % 4.8 (H) 0 - 3 %    Basophils % 0.5 0 - 1 %    Segs Absolute 3.5 K/CU MM    Lymphocytes Absolute 2.9 K/CU MM    Monocytes Absolute 0.6 K/CU MM    Eosinophils Absolute 0.4 K/CU MM    Basophils Absolute 0.0 K/CU MM    Nucleated RBC % 0.0 %    Total Nucleated RBC 0.0 K/CU MM    TRC 0.0502 K/CU MM    Total Immature Neutrophil 0.02 K/CU MM    Immature Neutrophil % 0.3 0 - 0.43 %   Comprehensive Metabolic Panel w/ Reflex to MG   Result Value Ref Range    Sodium 122 (L) 135 - 145 MMOL/L    Potassium 3.9 3.5 - 5.1 MMOL/L    Chloride 85 (L) 99 - 110 mMol/L    CO2 26 21 - 32 MMOL/L    BUN 9 6 - 23 MG/DL    CREATININE 0.6 0.6 - 1.1 MG/DL    Glucose 109 (H) 70 - 99 MG/DL    Calcium 8.9 8.3 - 10.6 MG/DL    Alb 3.6 3.4 - 5.0 GM/DL    Total Protein 6.7 6.4 - 8.2 GM/DL    Total Bilirubin 0.4 0.0 - 1.0 MG/DL    ALT 9 (L) 10 - 40 U/L    AST 14 (L) 15 - 37 IU/L    Alkaline Phosphatase 62 40 - 128 IU/L    GFR Non-African American >60 >60 mL/min/1.73m2    GFR African American >60 >60 mL/min/1.73m2    Anion Gap 11 4 - 16   Troponin   Result Value Ref Range    Troponin T <0.010 <0.01 NG/ML   Brain Natriuretic Peptide   Result Value Ref Range    Pro-.5 <300 PG/ML   Drug screen multi urine   Result Value Ref Range    Cannabinoid Scrn, Ur UNCONFIRMED POSITIVE (A) NEGATIVE    Amphetamines UNCONFIRMED POSITIVE (A) NEGATIVE    Cocaine Metabolite NEGATIVE NEGATIVE    Benzodiazepine Screen, Urine NEGATIVE NEGATIVE    Barbiturate Screen, Ur NEGATIVE NEGATIVE    Opiates, Urine NEGATIVE NEGATIVE    Phencyclidine, Urine NEGATIVE NEGATIVE    Oxycodone NEGATIVE NEGATIVE   Ethanol   Result Value Ref Range    Alcohol Scrn <0.01 <0.01 %WT/VOL   Lipase   Result Value Ref Range    Lipase 13 13 - 60 IU/L      Radiographs (if obtained):  [] The following radiograph was interpreted by myself in the absence of a radiologist:  [x] Radiologist's Report Reviewed:    EKG (if obtained): (All EKG's are interpreted by myself in the absence of a cardiologist)  12 lead EKG as interpreted by me reveals normal sinus rhythm. Axis is normal. There are no ischemic ST elevations or other suspicious ST changes;  QRS interval is narrow, QT interval is not prolonged. Final interpretation: Normal sinus rhythm. MDM:  Plan of care is discussed thoroughly with the patient and family if present. If performed, all imaging and lab work also discussed with patient. All relevant prior results and chart reviewed if available. Patient presents as above. She is in no acute distress. Vital signs are normal.  She presents with 1 day of nausea, vomiting, diarrhea. She has benign abdominal exam and I do not immediately suspect acute intra-abdominal emergent process that requires advanced imaging. She does not have any symptoms that I would consider anginal equivalent at this time. EKG is nonischemic. Patient started on IV fluids, Zofran, Bentyl. Metabolic work-up is significant for hyponatremia which may be secondary to GI losses. Patient's vitals are stable on reevaluation and the patient is in no acute distress and appears more comfortable. Patient will be admitted for further hydration, management of symptoms and hyponatremia. She is agreeable with this plan of care. Clinical Impression:  1. Hyponatremia    2.  Nausea vomiting and diarrhea      (Please note that portions of this note may have been completed with a voice recognition program. Efforts were made to edit the dictations but occasionally words are mis-transcribed.)    MD Rowan Green MD  08/05/20 7402

## 2020-08-05 NOTE — PLAN OF CARE
Problem: Falls - Risk of:  Goal: Will remain free from falls  Description: Will remain free from falls  8/5/2020 1524 by Hazel Worthington RN  Outcome: Ongoing  8/5/2020 0638 by Pop Batista RN  Outcome: Ongoing  Goal: Absence of physical injury  Description: Absence of physical injury  8/5/2020 1524 by Hazel Worthington RN  Outcome: Ongoing  8/5/2020 0638 by Pop Batista RN  Outcome: Ongoing

## 2020-08-05 NOTE — CARE COORDINATION
LSW noted the consult for social work d/t Pt wears oxygen at home. LSW reviewed chart and attempted to call the Pt in the room. Pt did not answer. Per the chart, pt if from home with her sister. Pt has home O2 with [employer]. Pt has PCP. Pt was offered drug treatment information on last visit and she declined. Pt discharge plan is to return home no needs.      Electronically signed by MICHAEL Izaguirre on 8/5/2020 at 2:18 PM

## 2020-08-05 NOTE — H&P
fluticasone (FLONASE) 50 MCG/ACT nasal spray 2 sprays by Each Nostril route daily    Historical Provider, MD   levothyroxine (SYNTHROID) 100 MCG tablet Take 100 mcg by mouth Daily    Historical Provider, MD   gabapentin (NEURONTIN) 800 MG tablet Take 800 mg by mouth 3 times daily. Historical Provider, MD   promethazine (PHENERGAN) 25 MG tablet Take 25 mg by mouth every 6 hours as needed for Nausea    Historical Provider, MD   albuterol sulfate HFA (VENTOLIN HFA) 108 (90 Base) MCG/ACT inhaler Inhale 1 puff into the lungs every 6 hours as needed for Wheezing    Historical Provider, MD   escitalopram (LEXAPRO) 20 MG tablet Take 20 mg by mouth daily    Historical Provider, MD   docusate sodium (COLACE) 100 MG capsule Take 100 mg by mouth 2 times daily    Historical Provider, MD   atorvastatin (LIPITOR) 80 MG tablet Take 80 mg by mouth nightly    Historical Provider, MD   aspirin 81 MG chewable tablet Take 81 mg by mouth daily    Historical Provider, MD   carvedilol (COREG) 3.125 MG tablet Take 3.125 mg by mouth 2 times daily (with meals)    Historical Provider, MD   traMADol (ULTRAM) 50 MG tablet Take 50 mg by mouth every 6 hours as needed for Pain. Historical Provider, MD   clopidogrel (PLAVIX) 75 MG tablet Take 75 mg by mouth daily    Historical Provider, MD   fluticasone-salmeterol (ADVAIR) 250-50 MCG/DOSE AEPB Inhale 1 puff into the lungs 2 times daily    Historical Provider, MD       Allergies:    Patient has no known allergies. Social History:    TOBACCO:   reports that she has been smoking. She has never used smokeless tobacco.  ETOH:   has no history on file for alcohol. Family History:    No family history on file. Vitals:   Vitals:    08/04/20 2109 08/04/20 2316 08/04/20 2320   BP:   (!) 154/87   Pulse: 86 87    Resp: 19 16    Temp:  98.3 °F (36.8 °C)    SpO2: 98% 93%        Physical Exam  GEN - Appears to be withdrawing from substance, is irritable with wakening and intermittently drowsy. Easily arousable. Unable to lie still in bed. EYES -PERRLA. No scleral erythema, discharge, or conjunctivitis. HENT -MM are moist. Oral pharynx without exudates, no evidence of thrush. NECK -Supple, no apparent thyromegaly or masses. RESP -CTA, no wheezes, rales or rhonchi. Symmetric chest movement while on room air. C/V -S1/S2 auscultated. RRR without appreciable M/R/G. No JVD or carotid bruits. Peripheral pulses equal bilaterally and palpable. Cap refill <3 sec. No peripheral edema. GI -Abdomen is soft non distended and without significant tenderness to palpation. + BS. No masses or guarding.  -No CVA/ flank tenderness. Clay catheter is not present. LYMPH-No palpable cervical lymphadenopathy and no hepatosplenomegaly. No petechiae or ecchymoses. MS -No gross joint deformities. SKIN -Normal coloration, warm, dry. NEURO-Drowsy but awakens to voice, alert to person place and time. Cranial nerves appear grossly intact, normal speech, no lateralizing weakness. PSYC- Appropriate affect. Imaging: Reviewed. No results found. Labs:  Reviewed  Lab Results:  CBC   Recent Labs     08/04/20 1935   WBC 7.4   HGB 12.8   HCT 38.3         RENAL  Recent Labs     08/04/20 1935   *   K 3.9   CL 85*   CO2 26   BUN 9   CREATININE 0.6     LFT'S  Recent Labs     08/04/20 1935   AST 14*   ALT 9*   BILITOT 0.4   ALKPHOS 62     COAG  No results for input(s): INR in the last 72 hours. CARDIAC ENZYMES  No results for input(s): CKTOTAL, CKMB, CKMBINDEX, TROPONINI in the last 72 hours.   U/A:    Lab Results   Component Value Date    COLORU KATEY 05/05/2020    WBCUA 1 05/05/2020    RBCUA <1 05/05/2020    MUCUS RARE 05/05/2020    BACTERIA RARE 05/05/2020    CLARITYU CLEAR 05/05/2020    SPECGRAV 1.013 05/05/2020    LEUKOCYTESUR NEGATIVE 05/05/2020    BLOODU NEGATIVE 05/05/2020     ABG  No results found for: RSM5LIZ, BEART, O7WLSOYZ, PHART, THGBART, UPT2KSZ, PO2ART, PXY1GBL      Medical Decision Making:  Nausea, vomiting, diarrhea   PRN antiemetics   IVF   GI disease panel pending   Check lipase    Hyponatremia  Likely 2/2 above  Hold lexapro and trazadone   Gentle fluids  Monitor Na levels, avoid overcorrection to avoid central pontine myelinolysis    Cocaine, marijuana, and amphetamine abuse   Appears to be withdrawing currently, admits to marijuana use today, denying other drug use   UDS and ETOH pending       Chronic conditions:  CAD   Continue ASA, plavix, BB  Hypothyroidism   Continue synthroid  Hepatitis C  HLD  COPD    Continue home inhalers  HTN   BB  Nicotine use    DVT Prophylaxis: Lovenox  Diet: General  Code Status: Full    Margaret López PA-C  [id number] [facility]

## 2020-08-06 LAB
ANION GAP SERPL CALCULATED.3IONS-SCNC: 9 MMOL/L (ref 4–16)
BUN BLDV-MCNC: 7 MG/DL (ref 6–23)
CALCIUM SERPL-MCNC: 8.5 MG/DL (ref 8.3–10.6)
CHLORIDE BLD-SCNC: 102 MMOL/L (ref 99–110)
CO2: 28 MMOL/L (ref 21–32)
CREAT SERPL-MCNC: 0.6 MG/DL (ref 0.6–1.1)
GFR AFRICAN AMERICAN: >60 ML/MIN/1.73M2
GFR NON-AFRICAN AMERICAN: >60 ML/MIN/1.73M2
GLUCOSE BLD-MCNC: 137 MG/DL (ref 70–99)
HCT VFR BLD CALC: 38.6 % (ref 37–47)
HEMOGLOBIN: 12.3 GM/DL (ref 12.5–16)
MCH RBC QN AUTO: 30 PG (ref 27–31)
MCHC RBC AUTO-ENTMCNC: 31.9 % (ref 32–36)
MCV RBC AUTO: 94.1 FL (ref 78–100)
PDW BLD-RTO: 12.3 % (ref 11.7–14.9)
PLATELET # BLD: 223 K/CU MM (ref 140–440)
PMV BLD AUTO: 8.1 FL (ref 7.5–11.1)
POTASSIUM SERPL-SCNC: 4.4 MMOL/L (ref 3.5–5.1)
RBC # BLD: 4.1 M/CU MM (ref 4.2–5.4)
SODIUM BLD-SCNC: 139 MMOL/L (ref 135–145)
WBC # BLD: 6 K/CU MM (ref 4–10.5)

## 2020-08-06 PROCEDURE — 94150 VITAL CAPACITY TEST: CPT

## 2020-08-06 PROCEDURE — 93010 ELECTROCARDIOGRAM REPORT: CPT | Performed by: INTERNAL MEDICINE

## 2020-08-06 PROCEDURE — 2580000003 HC RX 258: Performed by: PHYSICIAN ASSISTANT

## 2020-08-06 PROCEDURE — 36415 COLL VENOUS BLD VENIPUNCTURE: CPT

## 2020-08-06 PROCEDURE — G0378 HOSPITAL OBSERVATION PER HR: HCPCS

## 2020-08-06 PROCEDURE — 85027 COMPLETE CBC AUTOMATED: CPT

## 2020-08-06 PROCEDURE — 6370000000 HC RX 637 (ALT 250 FOR IP): Performed by: INTERNAL MEDICINE

## 2020-08-06 PROCEDURE — 6370000000 HC RX 637 (ALT 250 FOR IP): Performed by: PHYSICIAN ASSISTANT

## 2020-08-06 PROCEDURE — 1200000000 HC SEMI PRIVATE

## 2020-08-06 PROCEDURE — 6360000002 HC RX W HCPCS: Performed by: PHYSICIAN ASSISTANT

## 2020-08-06 PROCEDURE — 80048 BASIC METABOLIC PNL TOTAL CA: CPT

## 2020-08-06 RX ADMIN — LORAZEPAM 0.5 MG: 0.5 TABLET ORAL at 13:29

## 2020-08-06 RX ADMIN — SODIUM CHLORIDE: 9 INJECTION, SOLUTION INTRAVENOUS at 03:36

## 2020-08-06 RX ADMIN — LEVOTHYROXINE SODIUM 100 MCG: 100 TABLET ORAL at 04:52

## 2020-08-06 RX ADMIN — CLOPIDOGREL BISULFATE 75 MG: 75 TABLET ORAL at 08:55

## 2020-08-06 RX ADMIN — TRAMADOL HYDROCHLORIDE 50 MG: 50 TABLET, FILM COATED ORAL at 17:49

## 2020-08-06 RX ADMIN — GABAPENTIN 800 MG: 400 CAPSULE ORAL at 13:28

## 2020-08-06 RX ADMIN — NICOTINE POLACRILEX 2 MG: 2 LOZENGE ORAL at 21:13

## 2020-08-06 RX ADMIN — PROMETHAZINE HYDROCHLORIDE 12.5 MG: 25 TABLET ORAL at 21:13

## 2020-08-06 RX ADMIN — GABAPENTIN 800 MG: 400 CAPSULE ORAL at 08:55

## 2020-08-06 RX ADMIN — CARVEDILOL 3.12 MG: 3.12 TABLET, FILM COATED ORAL at 17:45

## 2020-08-06 RX ADMIN — TRAMADOL HYDROCHLORIDE 50 MG: 50 TABLET, FILM COATED ORAL at 05:01

## 2020-08-06 RX ADMIN — CARVEDILOL 3.12 MG: 3.12 TABLET, FILM COATED ORAL at 08:55

## 2020-08-06 RX ADMIN — TRAMADOL HYDROCHLORIDE 50 MG: 50 TABLET, FILM COATED ORAL at 23:41

## 2020-08-06 RX ADMIN — LORAZEPAM 0.5 MG: 0.5 TABLET ORAL at 21:14

## 2020-08-06 RX ADMIN — GABAPENTIN 800 MG: 400 CAPSULE ORAL at 21:13

## 2020-08-06 RX ADMIN — NICOTINE POLACRILEX 2 MG: 2 LOZENGE ORAL at 08:57

## 2020-08-06 RX ADMIN — ATORVASTATIN CALCIUM 80 MG: 80 TABLET, FILM COATED ORAL at 21:13

## 2020-08-06 RX ADMIN — LORAZEPAM 0.5 MG: 0.5 TABLET ORAL at 04:53

## 2020-08-06 RX ADMIN — SODIUM CHLORIDE, PRESERVATIVE FREE 10 ML: 5 INJECTION INTRAVENOUS at 21:00

## 2020-08-06 RX ADMIN — ENOXAPARIN SODIUM 40 MG: 40 INJECTION SUBCUTANEOUS at 08:55

## 2020-08-06 RX ADMIN — PROMETHAZINE HYDROCHLORIDE 12.5 MG: 25 TABLET ORAL at 04:53

## 2020-08-06 RX ADMIN — ASPIRIN 81 MG: 81 TABLET, CHEWABLE ORAL at 08:55

## 2020-08-06 RX ADMIN — BUDESONIDE AND FORMOTEROL FUMARATE DIHYDRATE 2 PUFF: 160; 4.5 AEROSOL RESPIRATORY (INHALATION) at 08:54

## 2020-08-06 ASSESSMENT — PAIN DESCRIPTION - PROGRESSION: CLINICAL_PROGRESSION: NOT CHANGED

## 2020-08-06 ASSESSMENT — PAIN SCALES - GENERAL
PAINLEVEL_OUTOF10: 8
PAINLEVEL_OUTOF10: 8
PAINLEVEL_OUTOF10: 6
PAINLEVEL_OUTOF10: 9
PAINLEVEL_OUTOF10: 7

## 2020-08-06 ASSESSMENT — PAIN DESCRIPTION - DESCRIPTORS: DESCRIPTORS: ACHING

## 2020-08-06 ASSESSMENT — PAIN DESCRIPTION - PAIN TYPE: TYPE: ACUTE PAIN

## 2020-08-06 ASSESSMENT — PAIN DESCRIPTION - LOCATION: LOCATION: ABDOMEN

## 2020-08-06 ASSESSMENT — PAIN - FUNCTIONAL ASSESSMENT: PAIN_FUNCTIONAL_ASSESSMENT: ACTIVITIES ARE NOT PREVENTED

## 2020-08-06 ASSESSMENT — PAIN DESCRIPTION - FREQUENCY: FREQUENCY: CONTINUOUS

## 2020-08-07 ENCOUNTER — APPOINTMENT (OUTPATIENT)
Dept: NUCLEAR MEDICINE | Age: 60
DRG: 641 | End: 2020-08-07
Payer: MEDICARE

## 2020-08-07 VITALS
RESPIRATION RATE: 17 BRPM | OXYGEN SATURATION: 90 % | DIASTOLIC BLOOD PRESSURE: 91 MMHG | WEIGHT: 151 LBS | TEMPERATURE: 98.1 F | HEIGHT: 62 IN | SYSTOLIC BLOOD PRESSURE: 170 MMHG | HEART RATE: 64 BPM | BODY MASS INDEX: 27.79 KG/M2

## 2020-08-07 PROBLEM — E87.1 HYPONATREMIA: Status: RESOLVED | Noted: 2020-08-05 | Resolved: 2020-08-07

## 2020-08-07 LAB
GLUCOSE BLD-MCNC: 110 MG/DL (ref 70–99)
LV EF: 60 %
LVEF MODALITY: NORMAL
TROPONIN T: <0.01 NG/ML

## 2020-08-07 PROCEDURE — 78452 HT MUSCLE IMAGE SPECT MULT: CPT

## 2020-08-07 PROCEDURE — 6370000000 HC RX 637 (ALT 250 FOR IP): Performed by: INTERNAL MEDICINE

## 2020-08-07 PROCEDURE — 93005 ELECTROCARDIOGRAM TRACING: CPT | Performed by: INTERNAL MEDICINE

## 2020-08-07 PROCEDURE — 3430000000 HC RX DIAGNOSTIC RADIOPHARMACEUTICAL: Performed by: INTERNAL MEDICINE

## 2020-08-07 PROCEDURE — 2580000003 HC RX 258: Performed by: PHYSICIAN ASSISTANT

## 2020-08-07 PROCEDURE — 94640 AIRWAY INHALATION TREATMENT: CPT

## 2020-08-07 PROCEDURE — 94761 N-INVAS EAR/PLS OXIMETRY MLT: CPT

## 2020-08-07 PROCEDURE — 6360000002 HC RX W HCPCS: Performed by: PHYSICIAN ASSISTANT

## 2020-08-07 PROCEDURE — 84484 ASSAY OF TROPONIN QUANT: CPT

## 2020-08-07 PROCEDURE — 99221 1ST HOSP IP/OBS SF/LOW 40: CPT | Performed by: INTERNAL MEDICINE

## 2020-08-07 PROCEDURE — A9500 TC99M SESTAMIBI: HCPCS | Performed by: INTERNAL MEDICINE

## 2020-08-07 PROCEDURE — 6370000000 HC RX 637 (ALT 250 FOR IP): Performed by: PHYSICIAN ASSISTANT

## 2020-08-07 PROCEDURE — 93017 CV STRESS TEST TRACING ONLY: CPT

## 2020-08-07 PROCEDURE — 6360000002 HC RX W HCPCS: Performed by: INTERNAL MEDICINE

## 2020-08-07 PROCEDURE — 82962 GLUCOSE BLOOD TEST: CPT

## 2020-08-07 RX ORDER — LISINOPRIL 10 MG/1
10 TABLET ORAL ONCE
Status: COMPLETED | OUTPATIENT
Start: 2020-08-07 | End: 2020-08-07

## 2020-08-07 RX ORDER — LISINOPRIL AND HYDROCHLOROTHIAZIDE 12.5; 1 MG/1; MG/1
1 TABLET ORAL DAILY
Qty: 90 TABLET | Refills: 3 | Status: SHIPPED | OUTPATIENT
Start: 2020-08-07 | End: 2020-08-07 | Stop reason: HOSPADM

## 2020-08-07 RX ORDER — NITROGLYCERIN 0.4 MG/1
TABLET SUBLINGUAL
Status: DISCONTINUED
Start: 2020-08-07 | End: 2020-08-07 | Stop reason: HOSPADM

## 2020-08-07 RX ORDER — LORAZEPAM 1 MG/1
1 TABLET ORAL ONCE
Status: COMPLETED | OUTPATIENT
Start: 2020-08-07 | End: 2020-08-07

## 2020-08-07 RX ORDER — AMINOPHYLLINE DIHYDRATE 25 MG/ML
500 INJECTION, SOLUTION INTRAVENOUS ONCE
Status: COMPLETED | OUTPATIENT
Start: 2020-08-07 | End: 2020-08-07

## 2020-08-07 RX ORDER — AMLODIPINE BESYLATE 5 MG/1
5 TABLET ORAL DAILY
Qty: 30 TABLET | Refills: 3 | Status: ON HOLD | OUTPATIENT
Start: 2020-08-07 | End: 2020-08-24 | Stop reason: SDUPTHER

## 2020-08-07 RX ADMIN — CARVEDILOL 3.12 MG: 3.12 TABLET, FILM COATED ORAL at 09:55

## 2020-08-07 RX ADMIN — GABAPENTIN 800 MG: 400 CAPSULE ORAL at 09:54

## 2020-08-07 RX ADMIN — SODIUM CHLORIDE, PRESERVATIVE FREE 10 ML: 5 INJECTION INTRAVENOUS at 09:57

## 2020-08-07 RX ADMIN — LISINOPRIL 10 MG: 10 TABLET ORAL at 12:15

## 2020-08-07 RX ADMIN — LEVOTHYROXINE SODIUM 100 MCG: 100 TABLET ORAL at 06:11

## 2020-08-07 RX ADMIN — GABAPENTIN 800 MG: 400 CAPSULE ORAL at 12:15

## 2020-08-07 RX ADMIN — ACETAMINOPHEN 650 MG: 325 TABLET ORAL at 12:15

## 2020-08-07 RX ADMIN — REGADENOSON 0.4 MG: 0.08 INJECTION, SOLUTION INTRAVENOUS at 13:16

## 2020-08-07 RX ADMIN — ASPIRIN 81 MG: 81 TABLET, CHEWABLE ORAL at 09:55

## 2020-08-07 RX ADMIN — LORAZEPAM 0.5 MG: 0.5 TABLET ORAL at 06:11

## 2020-08-07 RX ADMIN — ENOXAPARIN SODIUM 40 MG: 40 INJECTION SUBCUTANEOUS at 09:55

## 2020-08-07 RX ADMIN — LORAZEPAM 1 MG: 1 TABLET ORAL at 12:15

## 2020-08-07 RX ADMIN — AMINOPHYLLINE 75 MG: 25 INJECTION, SOLUTION INTRAVENOUS at 13:17

## 2020-08-07 RX ADMIN — BUDESONIDE AND FORMOTEROL FUMARATE DIHYDRATE 2 PUFF: 160; 4.5 AEROSOL RESPIRATORY (INHALATION) at 08:07

## 2020-08-07 RX ADMIN — Medication 30 MILLICURIE: at 13:15

## 2020-08-07 RX ADMIN — CLOPIDOGREL BISULFATE 75 MG: 75 TABLET ORAL at 09:55

## 2020-08-07 RX ADMIN — NICOTINE POLACRILEX 2 MG: 2 LOZENGE ORAL at 09:55

## 2020-08-07 RX ADMIN — Medication 10 MILLICURIE: at 11:55

## 2020-08-07 RX ADMIN — TRAMADOL HYDROCHLORIDE 50 MG: 50 TABLET, FILM COATED ORAL at 06:11

## 2020-08-07 ASSESSMENT — PAIN SCALES - GENERAL
PAINLEVEL_OUTOF10: 8
PAINLEVEL_OUTOF10: 3

## 2020-08-07 NOTE — DISCHARGE SUMMARY
Discharge Summary    Name:  Ami Avery /Age/Sex: 1960  (62 y.o. female)   MRN & CSN:  [id number] & [id number] Admission Date/Time: 2020 10:22 PM   Attending:  Luis Marsh MD Discharging Physician: Luis Marsh MD     Hospital Course:   Ami Avery is a 62 y.o.  female  who presents with nausea/vomiting. Also found to have hyponatremia.     Hospital problems  -Nausea/vomiting unclear etiology. ?cannabis induced.  -Hyponatremia -likely due to volume loss. -Multi-substance use disorder [amphetamine, cannabis]. - Hypertension   -Chest pain, AMI ruled out. Hospital course  Nausea and vomiting resolved with PRN antiemetics. Hyponatremia was corrected with fluid resuscitation. Cessation counseling was offered to patient but she was not interested. Abdominal discomfort was managed with PRN tramadol. Patient felt much better after 48 hours and was tolerating her diet. She was no longer nauseous or vomiting at the time of discharge. She was advised that cannabis could be contributing to her symptoms. Amlodipine 5 mg daily was added to Coreg at discharge for blood pressure management and advised to follow-up with her PCP for blood pressure recheck and antihypertensive adjustment. She complained of atypical chest pain on day of discharge. ECG did not show acute ischemic change and troponin was not elevated. Stress test was neg for ischemia. Patient was reassured and discharged home.     Other diagnoses, medications continued unless contraindicated  -History of hypothyroidism  -History of cocaine abuse  -Cigarette smoker  -COPD, on home oxygen   - CAD s/p PCI to [facility]  and . The patient expressed appropriate understanding of and agreement with the discharge recommendations, medications, and plan.      Consults this admission:  IP CONSULT TO HOSPITALIST  IP CONSULT TO SOCIAL WORK  IP CONSULT TO CARDIOLOGY    Discharge Instruction:   Follow up appointments:   Primary care physician:  within 2 weeks for BP recheck    Diet:  regular diet   Activity: activity as tolerated  Disposition: Discharged to:   [x]Home, []C, []SNF, []Acute Rehab, []Hospice  Condition on discharge: Stable    Discharge Medications:      Tammy Petty   Home Medication Instructions EDWARD:[id number]    Printed on:08/07/20 1969   Medication Information                      albuterol sulfate HFA (VENTOLIN HFA) 108 (90 Base) MCG/ACT inhaler  Inhale 1 puff into the lungs every 6 hours as needed for Wheezing             amLODIPine (NORVASC) 5 MG tablet  Take 1 tablet by mouth daily             aspirin 81 MG chewable tablet  Take 81 mg by mouth daily             atorvastatin (LIPITOR) 80 MG tablet  Take 80 mg by mouth nightly             carvedilol (COREG) 3.125 MG tablet  Take 3.125 mg by mouth 2 times daily (with meals)             clopidogrel (PLAVIX) 75 MG tablet  Take 75 mg by mouth daily             docusate sodium (COLACE) 100 MG capsule  Take 100 mg by mouth 2 times daily             escitalopram (LEXAPRO) 20 MG tablet  Take 20 mg by mouth daily             fluticasone (FLONASE) 50 MCG/ACT nasal spray  2 sprays by Each Nostril route daily             fluticasone-salmeterol (ADVAIR) 250-50 MCG/DOSE AEPB  Inhale 1 puff into the lungs 2 times daily             gabapentin (NEURONTIN) 800 MG tablet  Take 800 mg by mouth 3 times daily. levothyroxine (SYNTHROID) 100 MCG tablet  Take 100 mcg by mouth Daily             mometasone-formoterol (DULERA) 100-5 MCG/ACT inhaler  Inhale 2 puffs into the lungs 2 times daily             promethazine (PHENERGAN) 25 MG tablet  Take 25 mg by mouth every 6 hours as needed for Nausea             traMADol (ULTRAM) 50 MG tablet  Take 50 mg by mouth every 6 hours as needed for Pain.              traZODone (DESYREL) 50 MG tablet  Take 50 mg by mouth nightly                 Objective Findings at Discharge:   BP (!) 197/97   Pulse 63   Temp 98 °F (36.7 °C)   Resp 18   Ht 5' 2\" (1.575 m)   Wt 151 lb (68.5 kg)   SpO2 98%   BMI 27.62 kg/m²            PHYSICAL EXAM   GEN    Awake female, sitting upright in bed. RESP  breathing comfortably on room air. CARDIO/VASC           S1/S2 auscultated. Regular rate without appreciable murmurs. No peripheral edema. GI        Abdomen is soft without significant tenderness, masses, or guarding. Bowel sounds are normoactive. MSK    No gross joint deformities. SKIN    Normal coloration, warm, dry. NEURO           normal speech, no lateralizing weakness. PSYCH            Awake, alert, oriented x 3. BMP/CBC  Recent Labs     08/04/20  1935 08/05/20  0612 08/05/20  1501 08/05/20  2306 08/06/20  1000   * 134* 139 139  --    K 3.9 4.7 4.3 4.4  --    CL 85* 96* 102 102  --    CO2 26 28 29 28  --    BUN 9 7 7 7  --    CREATININE 0.6 0.6 0.7 0.6  --    WBC 7.4  --   --   --  6.0   HCT 38.3  --   --   --  38.6     --   --   --  223       IMAGING:  CXR -no acute abnormality.     Discharge Time of 35 minutes    Electronically signed by Alice Harper MD on 8/7/2020 at 2:44 PM

## 2020-08-07 NOTE — PLAN OF CARE
Problem: Falls - Risk of:  Goal: Will remain free from falls  Description: Will remain free from falls  8/7/2020 1039 by Daisy Acosta RN  Outcome: Ongoing  8/7/2020 0203 by Sinan Galindo RN  Outcome: Ongoing  Goal: Absence of physical injury  Description: Absence of physical injury  8/7/2020 1039 by Daisy Acosta RN  Outcome: Ongoing  8/7/2020 0203 by Sinan Galindo RN  Outcome: Ongoing     Problem: Pain:  Goal: Pain level will decrease  Description: Pain level will decrease  8/7/2020 1039 by Daisy Acosta RN  Outcome: Ongoing  8/7/2020 0203 by Sinan Galindo RN  Outcome: Ongoing  Goal: Control of acute pain  Description: Control of acute pain  8/7/2020 1039 by Daisy Acosta RN  Outcome: Ongoing  8/7/2020 0203 by Sinan Galindo RN  Outcome: Ongoing  Goal: Control of chronic pain  Description: Control of chronic pain  8/7/2020 1039 by Daisy Acosta RN  Outcome: Ongoing  8/7/2020 0203 by Sinan Galindo RN  Outcome: Ongoing     Problem: Discharge Planning:  Goal: Discharged to appropriate level of care  Description: Discharged to appropriate level of care  8/7/2020 1039 by Daisy Acosta RN  Outcome: Ongoing  8/7/2020 0203 by Sinan Galindo RN  Outcome: Ongoing

## 2020-08-07 NOTE — CONSULTS
Chart reviewed  Pt hunter has h/o pci now with nausea, vomitting  Now having CP  EKG no changes  serial trops  Stress today  Full note to follow                      Name:  Gonsalo Chan /Age/Sex: 1960  (62 y.o. female)   MRN & CSN:  [id number] & [id number] Admission Date/Time: 2020 10:22 PM   Location:  [address] [place] [address] PCP: Madisyn Gupta, [address] [place] [address] Day: 4          Referring physician:  Hillary White MD         Reason for consultation:  cp        Thanks for referral.    Information source: patient    CC;  cp      HPI:   Thank you for involving me in taking  care of Gonsalo Chan who  is a 62 y. o.year  Old female  Presents with  H/o CAD, PCI  LAD and Cx   , cocaine abuse   Admitted with increasing nonexertional CP with mild SOB , has been having nausea, vomitting,  trops and EKG are normal so far. Past medical history:    has a past medical history of Amphetamine abuse ([facility] Cocaine abuse ([facility] and NSTEMI (non-ST elevated myocardial infarction) ([facility] Past surgical history:   has no past surgical history on file. Social History:   reports that she has been smoking. She has never used smokeless tobacco. She reports current drug use. Drugs: Cocaine, Marijuana, Opiates , and Methamphetamines. Family history:  family history is not on file. No Known Allergies    No current facility-administered medications for this encounter. No current facility-administered medications for this encounter.       Current Outpatient Medications   Medication Sig Dispense Refill    amLODIPine (NORVASC) 5 MG tablet Take 1 tablet by mouth daily 30 tablet 3    cephALEXin (KEFLEX) 500 MG capsule Take 1 capsule by mouth 4 times daily for 10 days 40 capsule 0    sulfamethoxazole-trimethoprim (BACTRIM DS) 800-160 MG per tablet Take 1 tablet by mouth 2 times daily for 10 days 20 tablet 0    traZODone (DESYREL) 50 MG tablet Take 50 mg by mouth nightly      mometasone-formoterol (DULERA) 100-5 MCG/ACT inhaler Inhale 2 puffs into the lungs 2 times daily      fluticasone (FLONASE) 50 MCG/ACT nasal spray 2 sprays by Each Nostril route daily      levothyroxine (SYNTHROID) 100 MCG tablet Take 100 mcg by mouth Daily      gabapentin (NEURONTIN) 800 MG tablet Take 800 mg by mouth 3 times daily.  promethazine (PHENERGAN) 25 MG tablet Take 25 mg by mouth every 6 hours as needed for Nausea      albuterol sulfate HFA (VENTOLIN HFA) 108 (90 Base) MCG/ACT inhaler Inhale 1 puff into the lungs every 6 hours as needed for Wheezing      escitalopram (LEXAPRO) 20 MG tablet Take 20 mg by mouth daily      docusate sodium (COLACE) 100 MG capsule Take 100 mg by mouth 2 times daily      atorvastatin (LIPITOR) 80 MG tablet Take 80 mg by mouth nightly      aspirin 81 MG chewable tablet Take 81 mg by mouth daily      carvedilol (COREG) 3.125 MG tablet Take 3.125 mg by mouth 2 times daily (with meals)      traMADol (ULTRAM) 50 MG tablet Take 50 mg by mouth every 6 hours as needed for Pain.  clopidogrel (PLAVIX) 75 MG tablet Take 75 mg by mouth daily      fluticasone-salmeterol (ADVAIR) 250-50 MCG/DOSE AEPB Inhale 1 puff into the lungs 2 times daily       Review of Systems:  All 14 systems reviewed, all negative except for  CP    Physical Examination:    BP (!) 170/91   Pulse 64   Temp 98.1 °F (36.7 °C) (Oral)   Resp 17   Ht 5' 2\" (1.575 m)   Wt 151 lb (68.5 kg)   SpO2 90%   BMI 27.62 kg/m²      Wt Readings from Last 3 Encounters:   08/07/20 151 lb (68.5 kg)   05/08/20 183 lb 1.6 oz (83.1 kg)     Body mass index is 27.62 kg/m².       General Appearance:  Fair   Head: normocephalic     Eyes: normal, noninjected conjunctiva    ENT: normal mucosa, noninjected throat, normal     NECK: No JVP  No thyromegaly        Cardiovascular: No thrills palpated   Auscultation: Normal S1 and S2,  no murmur   carotid bruit no   Abdominal Aorta no bruit    Respiratory:    Breath sounds Clear = 0    Extremities:  Trace Edema clubbing ,   no cyanosis    SKIN: Warm and well perfused, no pallor or cyanosis    Vascular exam:  Pedal Pulses: palp  bilaterally        Abdomen:  No masses or tenderness. No organomegaly noted. Neurological:  Oriented to time, place, and person   No focal neurological deficit noted. Psychiatric:normal mood, no anxiety    Lab Review   No results for input(s): WBC, HGB, HCT, PLT in the last 72 hours. No results for input(s): NA, K, CL, CO2, PHOS, BUN, CREATININE in the last 72 hours. Invalid input(s): CA  No results for input(s): AST, ALT, ALB, BILIDIR, BILITOT, ALKPHOS in the last 72 hours. No results for input(s): TROPONINI in the last 72 hours.   No results found for: BNP  No results found for: INR, PROTIME        Assessment/Recommendations:     - CP: serial trops, EKGS, stress today  - HTN stable  - DC drug abuse  - hyperlip on statin         Ban Lund MD, 8/9/2020 9:27 PM

## 2020-08-07 NOTE — PROGRESS NOTES
Clean cab will be here to  patient in approx 45 min
DC complete patient waiting for ride
Patient came in from ED with chief complaints of N/V/D x 2 days. No episodes of diarrhea reported since ED admission. Alert a& oriented x 3. Cooperative but follows, very restless and anxious. No skin issues noted verified with ED nurse. oriented to room and call light. Patient verbalized needs.
Patient seen and examined. Admitted earlier this morning. H&P reviewed. 56-year-old woman admitted for nausea, vomiting diarrhea for about 2 days. Also found to be hyponatremic with sodium of 122. Chest x-ray negative for any acute abnormality. Urine drug screen positive for amphetamine and cannabis. Patient admitted to taking these substances for recreational use. Did not seem interested when offered cessation counseling. Kept asking for pain medication. She tolerated her breakfast   On exam   woman's lying comfortably in bed. Abdomen is soft, nontender, with normal bowel sounds. Assessment  -Nausea/vomiting unclear etiology. ? Cannabis induced  -Hyponatremia -likely due to volume loss. -Multi-substance use disorder [amphetamine, cannabis]  -History of cocaine abuse  -History of hypothyroidism  -Cigarette smoker  -COPD, on home oxygen    Plan  - Continue fluid resuscitation with normal saline  - Repeat BMP tomorrow  - Pain control only with PRN tramadol. Will not be escalating due to history of substance use and reported severity of abdominal pain is questionable.  -Plan to discharge in a day or 2 after sodium is corrected.   -Rest of the plan as documented by admitting hospitalist.
Patient seen and examined. She was just discharged this morning and has been reluctant to go home. Rapid response called because of chest pain  Patient reports central chest pain that is aggravated by touch. She has a history of CAD/NSTEMI  requiring PCI to proximal OM1 and 2 stents to proximal LAD on 4/28/2020 at [facility]. ECG shows normal sinus rhythm without any acute ST-T changes. Chest was tender to palpation. Patient reports that she is anxious. Assessment  -Atypical chest pain  Chest tender to palpation. ECG -NSR, no acute ischemic change. Plan is to get serial troponin and ask cardiology if they want any other work-up, otherwise proceed with discharge if no troponin elevation. I discussed with Dr. Noam Gomez and he plans to also do a stress test today. Give po ativan 1 mg once.        Radha Hauser, 8/7/2020, 11:46 AM
Physician Progress Note      Faith Sanchez  CSN #:                  [id number]  :                       1960  ADMIT DATE:       2020 10:22 PM  [address] [place] DATE:  RESPONDING  PROVIDER #:        Janie Ayala MD          QUERY TEXT:    Dr. Sara White,    Patient admitted with hyponatremia d/t N/V/D. Patient noted have COPD w/ home   O2. use. If possible, please document in progress notes and discharge summary   if you are evaluating and/or treating any of the following:    [Chronic respiratory failure w/ hypoxia, on home O2: This patient has Chronic   respiratory failure w/ hypoxia, on home O2]]    The medical record reflects the following:  Risk Factors: COPD  Clinical Indicators: documentation of COPD w/ home O2 use  Treatment: O2    Thank you,  Evi Rivera RN CDS  Options provided:  -- Chronic respiratory failure with hypercapnia  -- Other: please specify  -- Unable to determine  -- Other - I will add my own diagnosis  -- Disagree - Not applicable / Not valid  -- Disagree - Clinically unable to determine / Unknown  -- Refer to Clinical Documentation Reviewer    PROVIDER RESPONSE TEXT:    This patient has chronic respiratory failure with hypercapnia.     Query created by: Gwyn Gilmore on 2020 10:35 AM      Electronically signed by:  Janie Ayala MD 2020 11:53 AM
auscultated. Regular rate without appreciable murmurs. No peripheral edema. GI Abdomen is soft without significant tenderness, masses, or guarding. Bowel sounds are normoactive. MSK No gross joint deformities. SKIN Normal coloration, warm, dry. NEURO normal speech, no lateralizing weakness. PSYCH Awake, alert, oriented x 3.     Medications:   Medications:    sodium chloride flush  10 mL Intravenous 2 times per day    enoxaparin  40 mg Subcutaneous Daily    nicotine  1 patch Transdermal Daily    aspirin  81 mg Oral Daily    atorvastatin  80 mg Oral Nightly    carvedilol  3.125 mg Oral BID WC    clopidogrel  75 mg Oral Daily    levothyroxine  100 mcg Oral Daily    budesonide-formoterol  2 puff Inhalation BID    gabapentin  800 mg Oral TID      Infusions:   PRN Meds: sodium chloride flush, 10 mL, PRN  acetaminophen, 650 mg, Q6H PRN    Or  acetaminophen, 650 mg, Q6H PRN  polyethylene glycol, 17 g, Daily PRN  promethazine, 12.5 mg, Q6H PRN    Or  ondansetron, 4 mg, Q6H PRN  nicotine polacrilex, 2 mg, Q2H PRN  albuterol sulfate HFA, 1 puff, Q6H PRN  traMADol, 50 mg, Q6H PRN  LORazepam, 0.5 mg, Q6H PRN        CBC   Recent Labs     08/04/20  1935 08/06/20  1000   WBC 7.4 6.0   HGB 12.8 12.3*   HCT 38.3 38.6    223      BMP   Recent Labs     08/05/20  0612 08/05/20  1501 08/05/20  2306   * 139 139   K 4.7 4.3 4.4   CL 96* 102 102   CO2 28 29 28   BUN 7 7 7   CREATININE 0.6 0.7 0.6       Radiology report reviewed     Electronically signed by Kayleen Benoit MD on 8/6/2020 at 10:43 AM

## 2020-08-08 ENCOUNTER — HOSPITAL ENCOUNTER (EMERGENCY)
Age: 60
Discharge: HOME | End: 2020-08-08
Payer: MEDICARE

## 2020-08-08 VITALS
RESPIRATION RATE: 18 BRPM | HEIGHT: 62 IN | OXYGEN SATURATION: 95 % | TEMPERATURE: 98.4 F | HEART RATE: 74 BPM | BODY MASS INDEX: 28.16 KG/M2 | DIASTOLIC BLOOD PRESSURE: 74 MMHG | WEIGHT: 153 LBS | SYSTOLIC BLOOD PRESSURE: 106 MMHG

## 2020-08-08 LAB
EKG ATRIAL RATE: 65 BPM
EKG DIAGNOSIS: NORMAL
EKG P AXIS: 54 DEGREES
EKG P-R INTERVAL: 188 MS
EKG Q-T INTERVAL: 400 MS
EKG QRS DURATION: 92 MS
EKG QTC CALCULATION (BAZETT): 416 MS
EKG R AXIS: 56 DEGREES
EKG T AXIS: 65 DEGREES
EKG VENTRICULAR RATE: 65 BPM

## 2020-08-08 PROCEDURE — 93010 ELECTROCARDIOGRAM REPORT: CPT | Performed by: INTERNAL MEDICINE

## 2020-08-08 PROCEDURE — 99282 EMERGENCY DEPT VISIT SF MDM: CPT

## 2020-08-08 RX ORDER — SULFAMETHOXAZOLE AND TRIMETHOPRIM 800; 160 MG/1; MG/1
1 TABLET ORAL 2 TIMES DAILY
Qty: 20 TABLET | Refills: 0 | Status: ON HOLD | OUTPATIENT
Start: 2020-08-08 | End: 2020-08-24 | Stop reason: HOSPADM

## 2020-08-08 RX ORDER — CEPHALEXIN 250 MG/1
500 CAPSULE ORAL ONCE
Status: DISCONTINUED | OUTPATIENT
Start: 2020-08-08 | End: 2020-08-08 | Stop reason: HOSPADM

## 2020-08-08 RX ORDER — SULFAMETHOXAZOLE AND TRIMETHOPRIM 800; 160 MG/1; MG/1
1 TABLET ORAL ONCE
Status: DISCONTINUED | OUTPATIENT
Start: 2020-08-08 | End: 2020-08-08 | Stop reason: HOSPADM

## 2020-08-08 RX ORDER — CEPHALEXIN 500 MG/1
500 CAPSULE ORAL 4 TIMES DAILY
Qty: 40 CAPSULE | Refills: 0 | Status: ON HOLD | OUTPATIENT
Start: 2020-08-08 | End: 2020-08-24 | Stop reason: HOSPADM

## 2020-08-08 NOTE — CARE COORDINATION
Pt identified as potential readmission. Last admission 8/4 - 8/7 for who presented with nausea/vomiting. Pt here today for Cellulitis of right upper extremity. Patient offered incision and drainage versus conservative therapy with oral antibiotics. She oral antibiotics with close follow-up.     Patient was provided a prescription for pain Keflex and Bactrim DS. Patient agrees to have wound check in 48-72 hours. Readmission was avoided and pt was able to be d/c'd home.

## 2020-08-08 NOTE — ED PROVIDER NOTES
EMERGENCY DEPARTMENT ENCOUNTER      PCP: Adrian Dickson DO    CHIEF COMPLAINT    Chief Complaint   Patient presents with    Other     discharged yesterday, thinks IV site is infected         This patient was not evaluated by the attending physician. I have independently evaluated this patient . HPI    Vinny Gilliland is a 62 y.o. female who presents with concerns that her IV site is infected. She was discharged yesterday from this hospital.  She said she noticed this morning that her right antecubital fossa was red with a small bit of pus at the IV site. She denies any fevers, nausea, vomiting or diarrhea. She is not diabetic, nor is she immunosuppressed. REVIEW OF SYSTEMS    Constitutional:  Denies fever, chills  HENT:  Denies sore throat or ear pain   Respiratory:  Denies cough or shortness of breath   Cardiovascular:  Denies chest pain, palpitations or swelling   GI:  Denies abdominal pain, nausea, vomiting, or diarrhea   Musculoskeletal:  Denies back pain   Skin:  See HPI  Neurologic:  Denies headache, focal weakness or sensory changes   Endocrine:  Denies polyuria or polydypsia   Lymphatic:  Denies swollen glands     All other review of systems are negative  See HPI and nursing notes for additional information     PAST MEDICAL HISTORY    Past Medical History:   Diagnosis Date    Amphetamine abuse ([facility] 2020    Cocaine abuse ([facility] 2020    NSTEMI (non-ST elevated myocardial infarction) ([facility] 04/2020       SURGICAL HISTORY    History reviewed. No pertinent surgical history.     CURRENT MEDICATIONS    Current Outpatient Rx   Medication Sig Dispense Refill    cephALEXin (KEFLEX) 500 MG capsule Take 1 capsule by mouth 4 times daily for 10 days 40 capsule 0    sulfamethoxazole-trimethoprim (BACTRIM DS) 800-160 MG per tablet Take 1 tablet by mouth 2 times daily for 10 days 20 tablet 0    amLODIPine (NORVASC) 5 MG tablet Take 1 tablet by mouth daily 30 tablet 3    traZODone (DESYREL) 50 MG tablet Take 50 mg by mouth nightly      mometasone-formoterol (DULERA) 100-5 MCG/ACT inhaler Inhale 2 puffs into the lungs 2 times daily      fluticasone (FLONASE) 50 MCG/ACT nasal spray 2 sprays by Each Nostril route daily      levothyroxine (SYNTHROID) 100 MCG tablet Take 100 mcg by mouth Daily      gabapentin (NEURONTIN) 800 MG tablet Take 800 mg by mouth 3 times daily.  promethazine (PHENERGAN) 25 MG tablet Take 25 mg by mouth every 6 hours as needed for Nausea      albuterol sulfate HFA (VENTOLIN HFA) 108 (90 Base) MCG/ACT inhaler Inhale 1 puff into the lungs every 6 hours as needed for Wheezing      escitalopram (LEXAPRO) 20 MG tablet Take 20 mg by mouth daily      docusate sodium (COLACE) 100 MG capsule Take 100 mg by mouth 2 times daily      atorvastatin (LIPITOR) 80 MG tablet Take 80 mg by mouth nightly      aspirin 81 MG chewable tablet Take 81 mg by mouth daily      carvedilol (COREG) 3.125 MG tablet Take 3.125 mg by mouth 2 times daily (with meals)      traMADol (ULTRAM) 50 MG tablet Take 50 mg by mouth every 6 hours as needed for Pain.  clopidogrel (PLAVIX) 75 MG tablet Take 75 mg by mouth daily      fluticasone-salmeterol (ADVAIR) 250-50 MCG/DOSE AEPB Inhale 1 puff into the lungs 2 times daily         ALLERGIES    No Known Allergies    FAMILY HISTORY    History reviewed. No pertinent family history. SOCIAL HISTORY    Social History     Socioeconomic History    Marital status:       Spouse name: None    Number of children: None    Years of education: None    Highest education level: None   Occupational History    None   Social Needs    Financial resource strain: None    Food insecurity     Worry: None     Inability: None    Transportation needs     Medical: None     Non-medical: None   Tobacco Use    Smoking status: Current Every Day Smoker    Smokeless tobacco: Never Used   Substance and Sexual Activity    Alcohol use: None    Drug use: Yes     Types: Cocaine, Marijuana, Opiates , Methamphetamines     Comment: 2020 based on UDS    Sexual activity: None   Lifestyle    Physical activity     Days per week: None     Minutes per session: None    Stress: None   Relationships    Social connections     Talks on phone: None     Gets together: None     Attends Yazidi service: None     Active member of club or organization: None     Attends meetings of clubs or organizations: None     Relationship status: None    Intimate partner violence     Fear of current or ex partner: None     Emotionally abused: None     Physically abused: None     Forced sexual activity: None   Other Topics Concern    None   Social History Narrative    None       PHYSICAL EXAM    VITAL SIGNS: /74   Pulse 74   Temp 98.4 °F (36.9 °C) (Oral)   Resp 18   Ht 5' 2\" (1.575 m)   Wt 153 lb (69.4 kg)   SpO2 95%   BMI 27.98 kg/m²   Constitutional:  Well developed, well nourished, no acute distress, non-toxic appearance   HENT:  Atraumatic, Normocephalic. Respiratory:  No respiratory distress, normal breath sounds   Cardiovascular:  Normal rate, normal rhythm, peripheral pulses equal, no edema  GI:  Soft, nondistended, nontender  Musculoskeletal:  No edema, no tenderness, no deformities. Integument: Right antecubital fossa with mild erythema approximately 3 cm across with half a centimeter of central induration. Lymphatics: No streaking or lymphadenopathy  Neurological: alert and oriented, no focal deficits         ED COURSE & MEDICAL DECISION MAKING        Patient offered incision and drainage versus conservative therapy with oral antibiotics. She oral antibiotics with close follow-up. Patient was provided a prescription for pain Keflex and Bactrim DS. Patient agrees to have wound check in 48-72 hours. I discussed the recommendation of application of tea tree oil, especially in the early phase-patient can also use in bath during soak.     Patient agrees to return emergency department if symptoms worsen or any new symptoms develop. Clinical  IMPRESSION    1. Cellulitis of right upper extremity        Comment: Please note this report has been produced using speech recognition software and may contain errors related to that system including errors in grammar, punctuation, and spelling, as well as words and phrases that may be inappropriate. If there are any questions or concerns please feel free to contact the dictating provider for clarification.        [place], [address]  08/09/20 3925

## 2020-08-10 LAB
EKG ATRIAL RATE: 86 BPM
EKG DIAGNOSIS: NORMAL
EKG P AXIS: 55 DEGREES
EKG P-R INTERVAL: 180 MS
EKG Q-T INTERVAL: 358 MS
EKG QRS DURATION: 88 MS
EKG QTC CALCULATION (BAZETT): 428 MS
EKG R AXIS: 35 DEGREES
EKG T AXIS: 57 DEGREES
EKG VENTRICULAR RATE: 86 BPM

## 2020-08-11 ENCOUNTER — APPOINTMENT (OUTPATIENT)
Dept: CT IMAGING | Age: 60
DRG: 329 | End: 2020-08-11
Payer: MEDICARE

## 2020-08-11 ENCOUNTER — HOSPITAL ENCOUNTER (INPATIENT)
Age: 60
LOS: 12 days | Discharge: HOME | DRG: 329 | End: 2020-08-24
Attending: EMERGENCY MEDICINE | Admitting: INTERNAL MEDICINE
Payer: MEDICARE

## 2020-08-11 LAB
ABO/RH: NORMAL
ALBUMIN SERPL-MCNC: 3.4 GM/DL (ref 3.4–5)
ALP BLD-CCNC: 67 IU/L (ref 40–129)
ALT SERPL-CCNC: 11 U/L (ref 10–40)
ANION GAP SERPL CALCULATED.3IONS-SCNC: 9 MMOL/L (ref 4–16)
ANTIBODY SCREEN: NEGATIVE
APTT: 27.8 SECONDS (ref 25.1–37.1)
AST SERPL-CCNC: 13 IU/L (ref 15–37)
BACTERIA: NEGATIVE /HPF
BASOPHILS ABSOLUTE: 0 K/CU MM
BASOPHILS RELATIVE PERCENT: 0.3 % (ref 0–1)
BILIRUB SERPL-MCNC: 0.2 MG/DL (ref 0–1)
BILIRUB SERPL-MCNC: 0.2 MG/DL (ref 0–1)
BILIRUBIN DIRECT: 0.2 MG/DL (ref 0–0.3)
BILIRUBIN URINE: NEGATIVE MG/DL
BILIRUBIN, INDIRECT: 0 MG/DL (ref 0–0.7)
BLOOD, URINE: ABNORMAL
BUN BLDV-MCNC: 10 MG/DL (ref 6–23)
CALCIUM SERPL-MCNC: 9 MG/DL (ref 8.3–10.6)
CHLORIDE BLD-SCNC: 98 MMOL/L (ref 99–110)
CLARITY: CLEAR
CO2: 27 MMOL/L (ref 21–32)
COLOR: YELLOW
CREAT SERPL-MCNC: 0.8 MG/DL (ref 0.6–1.1)
DIFFERENTIAL TYPE: ABNORMAL
EOSINOPHILS ABSOLUTE: 0.4 K/CU MM
EOSINOPHILS RELATIVE PERCENT: 3.6 % (ref 0–3)
GFR AFRICAN AMERICAN: >60 ML/MIN/1.73M2
GFR NON-AFRICAN AMERICAN: >60 ML/MIN/1.73M2
GLUCOSE BLD-MCNC: 52 MG/DL (ref 70–99)
GLUCOSE BLD-MCNC: 66 MG/DL
GLUCOSE BLD-MCNC: 66 MG/DL (ref 70–99)
GLUCOSE BLD-MCNC: 90 MG/DL (ref 70–99)
GLUCOSE, URINE: NEGATIVE MG/DL
HCT VFR BLD CALC: 39.9 % (ref 37–47)
HEMOGLOBIN: 13.3 GM/DL (ref 12.5–16)
HYALINE CASTS: 0 /LPF
IMMATURE NEUTROPHIL %: 0.6 % (ref 0–0.43)
INR BLD: 0.9 INDEX
KETONES, URINE: NEGATIVE MG/DL
LACTATE: 2.2 MMOL/L (ref 0.4–2)
LACTATE: 2.6 MMOL/L (ref 0.4–2)
LEUKOCYTE ESTERASE, URINE: NEGATIVE
LYMPHOCYTES ABSOLUTE: 4 K/CU MM
LYMPHOCYTES RELATIVE PERCENT: 39.5 % (ref 24–44)
MCH RBC QN AUTO: 30.6 PG (ref 27–31)
MCHC RBC AUTO-ENTMCNC: 33.3 % (ref 32–36)
MCV RBC AUTO: 91.9 FL (ref 78–100)
MONOCYTES ABSOLUTE: 0.8 K/CU MM
MONOCYTES RELATIVE PERCENT: 7.8 % (ref 0–4)
NITRITE URINE, QUANTITATIVE: NEGATIVE
NUCLEATED RBC %: 0 %
PDW BLD-RTO: 12.7 % (ref 11.7–14.9)
PH, URINE: 6 (ref 5–8)
PLATELET # BLD: 363 K/CU MM (ref 140–440)
PMV BLD AUTO: 8.1 FL (ref 7.5–11.1)
POTASSIUM SERPL-SCNC: 3.6 MMOL/L (ref 3.5–5.1)
PROTEIN UA: NEGATIVE MG/DL
PROTHROMBIN TIME: 10.9 SECONDS (ref 11.7–14.5)
RBC # BLD: 4.34 M/CU MM (ref 4.2–5.4)
RBC URINE: <1 /HPF (ref 0–6)
SEGMENTED NEUTROPHILS ABSOLUTE COUNT: 4.8 K/CU MM
SEGMENTED NEUTROPHILS RELATIVE PERCENT: 48.2 % (ref 36–66)
SODIUM BLD-SCNC: 134 MMOL/L (ref 135–145)
SPECIFIC GRAVITY UA: 1.01 (ref 1–1.03)
SQUAMOUS EPITHELIAL: 2 /HPF
TOTAL IMMATURE NEUTOROPHIL: 0.06 K/CU MM
TOTAL NUCLEATED RBC: 0 K/CU MM
TOTAL PROTEIN: 6.5 GM/DL (ref 6.4–8.2)
TRICHOMONAS: ABNORMAL /HPF
UROBILINOGEN, URINE: NORMAL MG/DL (ref 0.2–1)
WBC # BLD: 10 K/CU MM (ref 4–10.5)
WBC UA: <1 /HPF (ref 0–5)
YEAST: ABNORMAL /HPF

## 2020-08-11 PROCEDURE — 74177 CT ABD & PELVIS W/CONTRAST: CPT

## 2020-08-11 PROCEDURE — 80307 DRUG TEST PRSMV CHEM ANLYZR: CPT

## 2020-08-11 PROCEDURE — 96361 HYDRATE IV INFUSION ADD-ON: CPT

## 2020-08-11 PROCEDURE — 6360000002 HC RX W HCPCS

## 2020-08-11 PROCEDURE — 81001 URINALYSIS AUTO W/SCOPE: CPT

## 2020-08-11 PROCEDURE — 86900 BLOOD TYPING SEROLOGIC ABO: CPT

## 2020-08-11 PROCEDURE — 82247 BILIRUBIN TOTAL: CPT

## 2020-08-11 PROCEDURE — 85025 COMPLETE CBC W/AUTO DIFF WBC: CPT

## 2020-08-11 PROCEDURE — 85730 THROMBOPLASTIN TIME PARTIAL: CPT

## 2020-08-11 PROCEDURE — 2580000003 HC RX 258: Performed by: EMERGENCY MEDICINE

## 2020-08-11 PROCEDURE — 6360000004 HC RX CONTRAST MEDICATION: Performed by: EMERGENCY MEDICINE

## 2020-08-11 PROCEDURE — 86850 RBC ANTIBODY SCREEN: CPT

## 2020-08-11 PROCEDURE — 85610 PROTHROMBIN TIME: CPT

## 2020-08-11 PROCEDURE — 2580000003 HC RX 258

## 2020-08-11 PROCEDURE — 82248 BILIRUBIN DIRECT: CPT

## 2020-08-11 PROCEDURE — 96375 TX/PRO/DX INJ NEW DRUG ADDON: CPT

## 2020-08-11 PROCEDURE — 93005 ELECTROCARDIOGRAM TRACING: CPT | Performed by: EMERGENCY MEDICINE

## 2020-08-11 PROCEDURE — 6360000002 HC RX W HCPCS: Performed by: EMERGENCY MEDICINE

## 2020-08-11 PROCEDURE — 82962 GLUCOSE BLOOD TEST: CPT

## 2020-08-11 PROCEDURE — 83605 ASSAY OF LACTIC ACID: CPT

## 2020-08-11 PROCEDURE — 99285 EMERGENCY DEPT VISIT HI MDM: CPT

## 2020-08-11 PROCEDURE — 86901 BLOOD TYPING SEROLOGIC RH(D): CPT

## 2020-08-11 PROCEDURE — 80053 COMPREHEN METABOLIC PANEL: CPT

## 2020-08-11 RX ORDER — SODIUM CHLORIDE 0.9 % (FLUSH) 0.9 %
10 SYRINGE (ML) INJECTION 2 TIMES DAILY
Status: DISCONTINUED | OUTPATIENT
Start: 2020-08-11 | End: 2020-08-24 | Stop reason: HOSPADM

## 2020-08-11 RX ORDER — MORPHINE SULFATE 4 MG/ML
2 INJECTION, SOLUTION INTRAMUSCULAR; INTRAVENOUS ONCE
Status: COMPLETED | OUTPATIENT
Start: 2020-08-11 | End: 2020-08-11

## 2020-08-11 RX ORDER — ONDANSETRON 2 MG/ML
4 INJECTION INTRAMUSCULAR; INTRAVENOUS ONCE
Status: COMPLETED | OUTPATIENT
Start: 2020-08-11 | End: 2020-08-11

## 2020-08-11 RX ORDER — 0.9 % SODIUM CHLORIDE 0.9 %
1000 INTRAVENOUS SOLUTION INTRAVENOUS ONCE
Status: COMPLETED | OUTPATIENT
Start: 2020-08-11 | End: 2020-08-11

## 2020-08-11 RX ORDER — DEXTROSE MONOHYDRATE 25 G/50ML
INJECTION, SOLUTION INTRAVENOUS
Status: COMPLETED
Start: 2020-08-11 | End: 2020-08-11

## 2020-08-11 RX ORDER — DEXTROSE MONOHYDRATE 25 G/50ML
12.5 INJECTION, SOLUTION INTRAVENOUS ONCE
Status: COMPLETED | OUTPATIENT
Start: 2020-08-11 | End: 2020-08-11

## 2020-08-11 RX ADMIN — MORPHINE SULFATE 2 MG: 4 INJECTION, SOLUTION INTRAMUSCULAR; INTRAVENOUS at 22:42

## 2020-08-11 RX ADMIN — DEXTROSE MONOHYDRATE 12.5 G: 500 INJECTION PARENTERAL at 21:04

## 2020-08-11 RX ADMIN — IOPAMIDOL 75 ML: 755 INJECTION, SOLUTION INTRAVENOUS at 22:17

## 2020-08-11 RX ADMIN — SODIUM CHLORIDE 1000 ML: 9 INJECTION, SOLUTION INTRAVENOUS at 20:54

## 2020-08-11 RX ADMIN — DEXTROSE MONOHYDRATE 12.5 G: 25 INJECTION, SOLUTION INTRAVENOUS at 21:04

## 2020-08-11 RX ADMIN — ONDANSETRON 4 MG: 2 INJECTION INTRAMUSCULAR; INTRAVENOUS at 22:49

## 2020-08-11 SDOH — HEALTH STABILITY: MENTAL HEALTH: HOW OFTEN DO YOU HAVE A DRINK CONTAINING ALCOHOL?: NEVER

## 2020-08-11 ASSESSMENT — PAIN DESCRIPTION - ORIENTATION: ORIENTATION: RIGHT;LEFT

## 2020-08-11 ASSESSMENT — PAIN SCALES - GENERAL
PAINLEVEL_OUTOF10: 10
PAINLEVEL_OUTOF10: 10

## 2020-08-11 ASSESSMENT — PAIN DESCRIPTION - LOCATION: LOCATION: ABDOMEN

## 2020-08-11 ASSESSMENT — PAIN DESCRIPTION - ONSET: ONSET: ON-GOING

## 2020-08-11 ASSESSMENT — PAIN DESCRIPTION - DESCRIPTORS: DESCRIPTORS: CONSTANT

## 2020-08-11 ASSESSMENT — PAIN DESCRIPTION - FREQUENCY: FREQUENCY: CONTINUOUS

## 2020-08-11 ASSESSMENT — PAIN DESCRIPTION - PROGRESSION: CLINICAL_PROGRESSION: NOT CHANGED

## 2020-08-11 ASSESSMENT — PAIN DESCRIPTION - PAIN TYPE: TYPE: ACUTE PAIN

## 2020-08-12 ENCOUNTER — APPOINTMENT (OUTPATIENT)
Dept: CT IMAGING | Age: 60
DRG: 329 | End: 2020-08-12
Payer: MEDICARE

## 2020-08-12 PROBLEM — R10.9 ABDOMINAL PAIN: Status: ACTIVE | Noted: 2020-08-12

## 2020-08-12 LAB
ALBUMIN SERPL-MCNC: 2.8 GM/DL (ref 3.4–5)
ALP BLD-CCNC: 95 IU/L (ref 40–129)
ALT SERPL-CCNC: 30 U/L (ref 10–40)
AMPHETAMINES: NEGATIVE
ANION GAP SERPL CALCULATED.3IONS-SCNC: 9 MMOL/L (ref 4–16)
AST SERPL-CCNC: 40 IU/L (ref 15–37)
BARBITURATE SCREEN URINE: NEGATIVE
BASOPHILS ABSOLUTE: 0 K/CU MM
BASOPHILS RELATIVE PERCENT: 0.4 % (ref 0–1)
BENZODIAZEPINE SCREEN, URINE: NEGATIVE
BILIRUB SERPL-MCNC: 0.2 MG/DL (ref 0–1)
BUN BLDV-MCNC: 10 MG/DL (ref 6–23)
CALCIUM SERPL-MCNC: 8.2 MG/DL (ref 8.3–10.6)
CANNABINOID SCREEN URINE: ABNORMAL
CHLORIDE BLD-SCNC: 104 MMOL/L (ref 99–110)
CO2: 22 MMOL/L (ref 21–32)
COCAINE METABOLITE: NEGATIVE
CREAT SERPL-MCNC: 0.5 MG/DL (ref 0.6–1.1)
DIFFERENTIAL TYPE: ABNORMAL
EOSINOPHILS ABSOLUTE: 0 K/CU MM
EOSINOPHILS RELATIVE PERCENT: 0.1 % (ref 0–3)
GFR AFRICAN AMERICAN: >60 ML/MIN/1.73M2
GFR NON-AFRICAN AMERICAN: >60 ML/MIN/1.73M2
GLUCOSE BLD-MCNC: 126 MG/DL (ref 70–99)
HCT VFR BLD CALC: 42.6 % (ref 37–47)
HEMOGLOBIN: 13.6 GM/DL (ref 12.5–16)
IMMATURE NEUTROPHIL %: 0.4 % (ref 0–0.43)
LACTATE: 1.1 MMOL/L (ref 0.4–2)
LYMPHOCYTES ABSOLUTE: 1.2 K/CU MM
LYMPHOCYTES RELATIVE PERCENT: 10.6 % (ref 24–44)
MCH RBC QN AUTO: 29.6 PG (ref 27–31)
MCHC RBC AUTO-ENTMCNC: 31.9 % (ref 32–36)
MCV RBC AUTO: 92.6 FL (ref 78–100)
MONOCYTES ABSOLUTE: 0.6 K/CU MM
MONOCYTES RELATIVE PERCENT: 5.5 % (ref 0–4)
NUCLEATED RBC %: 0 %
OPIATES, URINE: NEGATIVE
OXYCODONE: NEGATIVE
PDW BLD-RTO: 12.6 % (ref 11.7–14.9)
PHENCYCLIDINE, URINE: NEGATIVE
PLATELET # BLD: 283 K/CU MM (ref 140–440)
PMV BLD AUTO: 8.1 FL (ref 7.5–11.1)
POTASSIUM SERPL-SCNC: 4.1 MMOL/L (ref 3.5–5.1)
RBC # BLD: 4.6 M/CU MM (ref 4.2–5.4)
SEGMENTED NEUTROPHILS ABSOLUTE COUNT: 9 K/CU MM
SEGMENTED NEUTROPHILS RELATIVE PERCENT: 83 % (ref 36–66)
SODIUM BLD-SCNC: 135 MMOL/L (ref 135–145)
TOTAL IMMATURE NEUTOROPHIL: 0.04 K/CU MM
TOTAL NUCLEATED RBC: 0 K/CU MM
TOTAL PROTEIN: 6.2 GM/DL (ref 6.4–8.2)
WBC # BLD: 10.8 K/CU MM (ref 4–10.5)

## 2020-08-12 PROCEDURE — 6360000002 HC RX W HCPCS: Performed by: EMERGENCY MEDICINE

## 2020-08-12 PROCEDURE — 96376 TX/PRO/DX INJ SAME DRUG ADON: CPT

## 2020-08-12 PROCEDURE — 96367 TX/PROPH/DG ADDL SEQ IV INF: CPT

## 2020-08-12 PROCEDURE — 71250 CT THORAX DX C-: CPT

## 2020-08-12 PROCEDURE — 2580000003 HC RX 258: Performed by: INTERNAL MEDICINE

## 2020-08-12 PROCEDURE — 96365 THER/PROPH/DIAG IV INF INIT: CPT

## 2020-08-12 PROCEDURE — 6360000002 HC RX W HCPCS: Performed by: INTERNAL MEDICINE

## 2020-08-12 PROCEDURE — 99221 1ST HOSP IP/OBS SF/LOW 40: CPT | Performed by: INTERNAL MEDICINE

## 2020-08-12 PROCEDURE — 94640 AIRWAY INHALATION TREATMENT: CPT

## 2020-08-12 PROCEDURE — APPSS60 APP SPLIT SHARED TIME 46-60 MINUTES: Performed by: NURSE PRACTITIONER

## 2020-08-12 PROCEDURE — 2500000003 HC RX 250 WO HCPCS: Performed by: EMERGENCY MEDICINE

## 2020-08-12 PROCEDURE — 1200000000 HC SEMI PRIVATE

## 2020-08-12 PROCEDURE — 6370000000 HC RX 637 (ALT 250 FOR IP): Performed by: NURSE PRACTITIONER

## 2020-08-12 PROCEDURE — 6370000000 HC RX 637 (ALT 250 FOR IP): Performed by: INTERNAL MEDICINE

## 2020-08-12 PROCEDURE — 80053 COMPREHEN METABOLIC PANEL: CPT

## 2020-08-12 PROCEDURE — C9113 INJ PANTOPRAZOLE SODIUM, VIA: HCPCS | Performed by: INTERNAL MEDICINE

## 2020-08-12 PROCEDURE — 96372 THER/PROPH/DIAG INJ SC/IM: CPT

## 2020-08-12 PROCEDURE — 99222 1ST HOSP IP/OBS MODERATE 55: CPT | Performed by: SURGERY

## 2020-08-12 PROCEDURE — 96375 TX/PRO/DX INJ NEW DRUG ADDON: CPT

## 2020-08-12 PROCEDURE — 85025 COMPLETE CBC W/AUTO DIFF WBC: CPT

## 2020-08-12 PROCEDURE — 83605 ASSAY OF LACTIC ACID: CPT

## 2020-08-12 PROCEDURE — 93010 ELECTROCARDIOGRAM REPORT: CPT | Performed by: INTERNAL MEDICINE

## 2020-08-12 PROCEDURE — 6360000002 HC RX W HCPCS: Performed by: NURSE PRACTITIONER

## 2020-08-12 PROCEDURE — 2580000003 HC RX 258: Performed by: EMERGENCY MEDICINE

## 2020-08-12 PROCEDURE — 80048 BASIC METABOLIC PNL TOTAL CA: CPT

## 2020-08-12 RX ORDER — BUDESONIDE AND FORMOTEROL FUMARATE DIHYDRATE 160; 4.5 UG/1; UG/1
2 AEROSOL RESPIRATORY (INHALATION) 2 TIMES DAILY
Status: DISCONTINUED | OUTPATIENT
Start: 2020-08-12 | End: 2020-08-24 | Stop reason: HOSPADM

## 2020-08-12 RX ORDER — ESCITALOPRAM OXALATE 10 MG/1
20 TABLET ORAL DAILY
Status: DISCONTINUED | OUTPATIENT
Start: 2020-08-12 | End: 2020-08-24 | Stop reason: HOSPADM

## 2020-08-12 RX ORDER — METOCLOPRAMIDE HYDROCHLORIDE 5 MG/ML
10 INJECTION INTRAMUSCULAR; INTRAVENOUS EVERY 8 HOURS
Status: DISCONTINUED | OUTPATIENT
Start: 2020-08-12 | End: 2020-08-22

## 2020-08-12 RX ORDER — SODIUM CHLORIDE 0.9 % (FLUSH) 0.9 %
10 SYRINGE (ML) INJECTION PRN
Status: DISCONTINUED | OUTPATIENT
Start: 2020-08-12 | End: 2020-08-24 | Stop reason: HOSPADM

## 2020-08-12 RX ORDER — PROMETHAZINE HYDROCHLORIDE 25 MG/1
12.5 TABLET ORAL EVERY 6 HOURS PRN
Status: DISCONTINUED | OUTPATIENT
Start: 2020-08-12 | End: 2020-08-24 | Stop reason: HOSPADM

## 2020-08-12 RX ORDER — AMLODIPINE BESYLATE 5 MG/1
5 TABLET ORAL DAILY
Status: DISCONTINUED | OUTPATIENT
Start: 2020-08-12 | End: 2020-08-17

## 2020-08-12 RX ORDER — MORPHINE SULFATE 4 MG/ML
2 INJECTION, SOLUTION INTRAMUSCULAR; INTRAVENOUS EVERY 4 HOURS PRN
Status: DISCONTINUED | OUTPATIENT
Start: 2020-08-12 | End: 2020-08-13

## 2020-08-12 RX ORDER — ACETAMINOPHEN 650 MG/1
650 SUPPOSITORY RECTAL EVERY 6 HOURS PRN
Status: DISCONTINUED | OUTPATIENT
Start: 2020-08-12 | End: 2020-08-24 | Stop reason: HOSPADM

## 2020-08-12 RX ORDER — SODIUM PHOSPHATE, DIBASIC AND SODIUM PHOSPHATE, MONOBASIC 7; 19 G/133ML; G/133ML
1 ENEMA RECTAL
Status: DISCONTINUED | OUTPATIENT
Start: 2020-08-12 | End: 2020-08-12

## 2020-08-12 RX ORDER — ONDANSETRON 2 MG/ML
4 INJECTION INTRAMUSCULAR; INTRAVENOUS EVERY 8 HOURS
Status: DISCONTINUED | OUTPATIENT
Start: 2020-08-12 | End: 2020-08-22

## 2020-08-12 RX ORDER — CARVEDILOL 3.12 MG/1
3.12 TABLET ORAL 2 TIMES DAILY WITH MEALS
Status: DISCONTINUED | OUTPATIENT
Start: 2020-08-12 | End: 2020-08-15

## 2020-08-12 RX ORDER — HALOPERIDOL 5 MG/ML
2 INJECTION INTRAMUSCULAR ONCE
Status: COMPLETED | OUTPATIENT
Start: 2020-08-12 | End: 2020-08-12

## 2020-08-12 RX ORDER — FENTANYL CITRATE 50 UG/ML
25 INJECTION, SOLUTION INTRAMUSCULAR; INTRAVENOUS ONCE
Status: COMPLETED | OUTPATIENT
Start: 2020-08-12 | End: 2020-08-12

## 2020-08-12 RX ORDER — SODIUM PHOSPHATE, DIBASIC AND SODIUM PHOSPHATE, MONOBASIC 7; 19 G/133ML; G/133ML
1 ENEMA RECTAL EVERY 4 HOURS
Status: ACTIVE | OUTPATIENT
Start: 2020-08-12 | End: 2020-08-13

## 2020-08-12 RX ORDER — ACETAMINOPHEN 325 MG/1
650 TABLET ORAL EVERY 6 HOURS PRN
Status: DISCONTINUED | OUTPATIENT
Start: 2020-08-12 | End: 2020-08-24 | Stop reason: HOSPADM

## 2020-08-12 RX ORDER — SODIUM CHLORIDE 0.9 % (FLUSH) 0.9 %
10 SYRINGE (ML) INJECTION EVERY 12 HOURS SCHEDULED
Status: DISCONTINUED | OUTPATIENT
Start: 2020-08-12 | End: 2020-08-24 | Stop reason: HOSPADM

## 2020-08-12 RX ORDER — PANTOPRAZOLE SODIUM 40 MG/10ML
40 INJECTION, POWDER, LYOPHILIZED, FOR SOLUTION INTRAVENOUS DAILY
Status: DISCONTINUED | OUTPATIENT
Start: 2020-08-12 | End: 2020-08-24 | Stop reason: HOSPADM

## 2020-08-12 RX ORDER — POLYETHYLENE GLYCOL 3350 17 G/17G
255 POWDER ORAL ONCE
Status: COMPLETED | OUTPATIENT
Start: 2020-08-12 | End: 2020-08-12

## 2020-08-12 RX ORDER — SODIUM CHLORIDE, SODIUM LACTATE, POTASSIUM CHLORIDE, CALCIUM CHLORIDE 600; 310; 30; 20 MG/100ML; MG/100ML; MG/100ML; MG/100ML
INJECTION, SOLUTION INTRAVENOUS CONTINUOUS
Status: DISCONTINUED | OUTPATIENT
Start: 2020-08-12 | End: 2020-08-22

## 2020-08-12 RX ORDER — LEVOTHYROXINE SODIUM 0.1 MG/1
100 TABLET ORAL DAILY
Status: DISCONTINUED | OUTPATIENT
Start: 2020-08-12 | End: 2020-08-24 | Stop reason: HOSPADM

## 2020-08-12 RX ORDER — ONDANSETRON 2 MG/ML
4 INJECTION INTRAMUSCULAR; INTRAVENOUS EVERY 6 HOURS PRN
Status: DISCONTINUED | OUTPATIENT
Start: 2020-08-12 | End: 2020-08-12

## 2020-08-12 RX ORDER — GABAPENTIN 400 MG/1
800 CAPSULE ORAL 3 TIMES DAILY
Status: DISCONTINUED | OUTPATIENT
Start: 2020-08-12 | End: 2020-08-24 | Stop reason: HOSPADM

## 2020-08-12 RX ADMIN — METRONIDAZOLE 500 MG: 500 INJECTION, SOLUTION INTRAVENOUS at 09:37

## 2020-08-12 RX ADMIN — FENTANYL CITRATE 25 MCG: 50 INJECTION INTRAMUSCULAR; INTRAVENOUS at 04:57

## 2020-08-12 RX ADMIN — HALOPERIDOL LACTATE 2 MG: 5 INJECTION, SOLUTION INTRAMUSCULAR at 04:56

## 2020-08-12 RX ADMIN — METOCLOPRAMIDE 10 MG: 5 INJECTION, SOLUTION INTRAMUSCULAR; INTRAVENOUS at 16:53

## 2020-08-12 RX ADMIN — SODIUM CHLORIDE, POTASSIUM CHLORIDE, SODIUM LACTATE AND CALCIUM CHLORIDE: 600; 310; 30; 20 INJECTION, SOLUTION INTRAVENOUS at 00:46

## 2020-08-12 RX ADMIN — GABAPENTIN 800 MG: 400 CAPSULE ORAL at 16:59

## 2020-08-12 RX ADMIN — POLYETHYLENE GLYCOL 3350 255 G: 17 POWDER, FOR SOLUTION ORAL at 17:15

## 2020-08-12 RX ADMIN — CARVEDILOL 3.12 MG: 3.12 TABLET, FILM COATED ORAL at 16:50

## 2020-08-12 RX ADMIN — FENTANYL CITRATE 25 MCG: 50 INJECTION, SOLUTION INTRAMUSCULAR; INTRAVENOUS at 00:46

## 2020-08-12 RX ADMIN — LEVOTHYROXINE SODIUM 100 MCG: 100 TABLET ORAL at 09:38

## 2020-08-12 RX ADMIN — MORPHINE SULFATE 2 MG: 4 INJECTION, SOLUTION INTRAMUSCULAR; INTRAVENOUS at 10:09

## 2020-08-12 RX ADMIN — BUDESONIDE AND FORMOTEROL FUMARATE DIHYDRATE 2 PUFF: 160; 4.5 AEROSOL RESPIRATORY (INHALATION) at 21:00

## 2020-08-12 RX ADMIN — METRONIDAZOLE 500 MG: 500 INJECTION, SOLUTION INTRAVENOUS at 03:17

## 2020-08-12 RX ADMIN — CEFTRIAXONE 1 G: 1 INJECTION, POWDER, FOR SOLUTION INTRAMUSCULAR; INTRAVENOUS at 01:43

## 2020-08-12 RX ADMIN — AMLODIPINE BESYLATE 5 MG: 5 TABLET ORAL at 09:38

## 2020-08-12 RX ADMIN — GABAPENTIN 800 MG: 400 CAPSULE ORAL at 09:37

## 2020-08-12 RX ADMIN — METRONIDAZOLE 500 MG: 500 INJECTION, SOLUTION INTRAVENOUS at 16:53

## 2020-08-12 RX ADMIN — ESCITALOPRAM OXALATE 20 MG: 10 TABLET ORAL at 09:37

## 2020-08-12 RX ADMIN — ONDANSETRON 4 MG: 2 INJECTION INTRAMUSCULAR; INTRAVENOUS at 16:59

## 2020-08-12 RX ADMIN — MORPHINE SULFATE 2 MG: 4 INJECTION, SOLUTION INTRAMUSCULAR; INTRAVENOUS at 17:18

## 2020-08-12 RX ADMIN — SODIUM CHLORIDE, PRESERVATIVE FREE 10 ML: 5 INJECTION INTRAVENOUS at 21:02

## 2020-08-12 RX ADMIN — GABAPENTIN 800 MG: 400 CAPSULE ORAL at 21:01

## 2020-08-12 RX ADMIN — CARVEDILOL 3.12 MG: 3.12 TABLET, FILM COATED ORAL at 10:09

## 2020-08-12 RX ADMIN — PANTOPRAZOLE SODIUM 40 MG: 40 INJECTION, POWDER, FOR SOLUTION INTRAVENOUS at 17:15

## 2020-08-12 RX ADMIN — PROMETHAZINE HYDROCHLORIDE 12.5 MG: 25 TABLET ORAL at 01:42

## 2020-08-12 RX ADMIN — SODIUM CHLORIDE, POTASSIUM CHLORIDE, SODIUM LACTATE AND CALCIUM CHLORIDE: 600; 310; 30; 20 INJECTION, SOLUTION INTRAVENOUS at 17:07

## 2020-08-12 ASSESSMENT — PAIN SCALES - GENERAL
PAINLEVEL_OUTOF10: 10

## 2020-08-12 ASSESSMENT — ENCOUNTER SYMPTOMS
RESPIRATORY NEGATIVE: 1
DIARRHEA: 0
SHORTNESS OF BREATH: 0
ALLERGIC/IMMUNOLOGIC NEGATIVE: 1
COLOR CHANGE: 0
NAUSEA: 1
SINUS PAIN: 0
PHOTOPHOBIA: 0
COUGH: 0
VOMITING: 1
ABDOMINAL PAIN: 1
CONSTIPATION: 1
EYES NEGATIVE: 1
BLOOD IN STOOL: 0

## 2020-08-12 ASSESSMENT — PAIN DESCRIPTION - PROGRESSION

## 2020-08-12 ASSESSMENT — PAIN DESCRIPTION - LOCATION
LOCATION: ABDOMEN
LOCATION: ABDOMEN

## 2020-08-12 ASSESSMENT — PAIN DESCRIPTION - ORIENTATION
ORIENTATION: MID
ORIENTATION: MID

## 2020-08-12 ASSESSMENT — PAIN DESCRIPTION - ONSET
ONSET: GRADUAL
ONSET: ON-GOING

## 2020-08-12 ASSESSMENT — PAIN DESCRIPTION - PAIN TYPE: TYPE: ACUTE PAIN

## 2020-08-12 ASSESSMENT — PAIN - FUNCTIONAL ASSESSMENT: PAIN_FUNCTIONAL_ASSESSMENT: PREVENTS OR INTERFERES WITH MANY ACTIVE NOT PASSIVE ACTIVITIES

## 2020-08-12 ASSESSMENT — PAIN DESCRIPTION - FREQUENCY
FREQUENCY: CONTINUOUS
FREQUENCY: CONTINUOUS

## 2020-08-12 ASSESSMENT — PAIN DESCRIPTION - DESCRIPTORS
DESCRIPTORS: ACHING
DESCRIPTORS: JABBING

## 2020-08-12 NOTE — CONSULTS
[facility]  Gastroenterology Consultation    2020  2:03 PM    Patient:    Bonnie Ho  : 1960   62 y.o. MRN: [id number]  Admitted: 2020  8:42 PM ATT: Royal Manrique,*   [id number]  AdmitDx: Lactic acidosis [E87.2]  Acute abdominal pain [R10.9]  Colitis [K52.9]  Abdominal pain [R10.9]  Large bowel obstruction ([address] [K56.609]  Fever, unspecified fever cause [R50.9]  Opacity of lung on imaging study [R91.8]  PCP: Florentin Hinton DO    Reason for Consult:  large bowel obstruction    Requesting Physician:  Royal Manrique,*      History Obtained From:  Patient and review of all records    HISTORY OF PRESENT ILLNESS:                The patient is a 62 y.o. female with significant   Past Medical History:   Diagnosis Date    [facility]     [facility] ([facility]     NSTEMI (non-ST elevated myocardial infarction) ([facility] 2020    who presented [facility] ED with c/o left side cramping dull achy intermittent abdominal pain started 2 weeks ago associated with N/V, chills, no fever Denies aggravating/relieving factors . Has been straining with BM's for the past 2 weeks. Reports small caliber stools. Approx 30 lb weight loss over 6 months. BRBPR. No dark or tarry stools. Has hemorrhoids. Hx of illicit drug use. Reports last cocaine use 2 weeks ago. Left sided abdominal pain  Reports N/V, GERD  Last BM yesterday, small    Denies history of EGD  History of colonoscopy about 5 years ago in [address]    No ASA   Takes Aleve daily for aches and pains   No Anti-platelet therapy    Current Smoker  Denies alcohol intake    Past Medical History:        Diagnosis Date    [facility]     Cocaine abuse ([facility]     NSTEMI (non-ST elevated myocardial infarction) ([facility] 2020       Past Surgical History:    History reviewed. No pertinent surgical history.       Current Medications:    Medications    Scheduled Medications:    swelling or stiffness and has had change in gait. NEUROLOGICAL:  Neg  Loss of Consciousness, memory loss, forgetfulness, periods of confusion, difficulty concentrating, seizures, decline in intellect, nervousness, insomina, aphasia or dysarthria. SKIN:  Neg  skin or hair changes, and has no itching, rashes, or sores. PSYCHIATRIC:  Neg , personality changes, anxiety. ENDOCRINE:  Neg polydipsia, polyuria, abnormal weight changes, heat /cold intolerance.   ALL/IMM:  Neg reactions to drugs other than listed    PHYSICAL EXAM:      Vitals:    BP (!) 176/98   Pulse 59   Temp 97.5 °F (36.4 °C) (Oral)   Resp 23   Ht 5' 2\" (1.575 m)   Wt 150 lb (68 kg)   SpO2 99%   BMI 27.44 kg/m²     General Appearance:    Alert, cooperative, no distress, appears stated age   HEENT:    Normocephalic, atraumatic, Conjunctiva clear, Lips, mucosa, and tongue normal; teeth and gums normal   Neck:   Supple, symmetrical, trachea midline   Lungs:     Clear to auscultation bilaterally, respirations unlabored   Chest Wall:    No tenderness or deformity    Heart:    Regular rate and rhythm, S1 and S2 normal, no murmur, rub   or gallop   Abdomen:     Soft, diffuse tenderness, bowel sounds active all four quadrants, no masses, no organomegaly, no ascites    Rectal:    Deferred   Extremities:   Extremities normal, atraumatic, no cyanosis or edema   Pulses:   2+ and symmetric all extremities   Skin:   Skin color, texture, turgor normal, no rashes or lesions   Lymph nodes:   No abnormality   Neurologic:   No focal deficits, moving all four extremities      DATA:    ABGs: No results found for: PHART, PO2ART, TQM2YRQ  CBC:   Recent Labs     08/11/20 2046 08/12/20  0725   WBC 10.0 10.8*   HGB 13.3 13.6    283     BMP:    Recent Labs     08/11/20 2046 08/11/20 2054 08/12/20  0725   *  --  135   K 3.6  --  4.1   CL 98*  --  104   CO2 27  --  22   BUN 10  --  10   CREATININE 0.8  --  0.5*   GLUCOSE 52* 66 126*     Magnesium: No results found for: MG  Hepatic:   Recent Labs     08/11/20 2046 08/12/20  0725   AST 13* 40*   ALT 11 30   BILITOT 0.2  0.2 0.2   ALKPHOS 67 95     No results for input(s): LIPASE, AMYLASE in the last 72 hours. Recent Labs     08/11/20 2046   PROTIME 10.9*   INR 0.90     No results for input(s): PTT in the last 72 hours. Lipids: No results for input(s): CHOL, HDL in the last 72 hours. Invalid input(s): LDLCALCU  INR:   Recent Labs     08/11/20 2046   INR 0.90     TSH: No results found for: TSH  No intake or output data in the 24 hours ending 08/12/20 1403   lactated ringers 100 mL/hr at 08/12/20 0046       Imaging Studies: Reviewed     CT-scan of abdomen and pelvis w/o contrast 8/11/2020  Focal area of abrupt narrowing involving the splenic flexure/distal    transverse colon with mucosal thickening and pericolonic stranding measuring    approximately 8 cm in length resulting in large bowel obstruction.  The    transverse colon proximally measures 6.7 cm.  This focal area could represent    a stricture, an area of acute on chronic colitis/diverticulitis, however    neoplasm cannot be entirely excluded.  Further evaluation with colonoscopy    with surgical evaluation is recommended. 2. No evidence of free air.     3. Focal nodular opacities are noted within the right middle lobe of    uncertain etiology.  This should be further evaluated with CT chest.      IMPRESSION:      Patient Active Problem List   Diagnosis Code    Colitis K52.9    Abdominal pain R10.9       ASSESSMENT/RECOMMENDATIONS:     Large bowel obstruction:  May be s/t ischemic bowel, intra-abdominal abscess, or pathology  Recommend Fleets enema q 3 hours   Recommend Reglan TID  Recommend Zofran TID   Recommend colonoscopy possibly tomorrow 8/13/2020, for further investigation  Keep NPO  Recommend Protonix for gastric mucosal protection   Continue IV fluids, maintain electrolyte balance   Recommend NG tube if emesis   Recommend UDS   Risks and benefits of colonoscopy thoroughly discussed with patient  Consent form signed and placed in front of soft chart. Discussed plan of care with patient and Yudith Rowland RN     Patient clinical, biochemical, and radiological information discussed with Dr. Monalisa Cole. He agrees with the assessment and plan. Diamond Monge CNP  8/12/2020  2:03 PM      Abnormal CT scan  ? Malignant  ? Inflammatory    C scope am    I have seen and examined this patient personally, and independently of the nurse practitioner. The plan was developed mutually at the time of the visit with the patient. Citlaly Aranda and myself have spoken with patient, nursing staff and provided written and verbal instructions .     The above note has been reviewed and I agree with the Assessment,  Diagnosis, and Treatment plan as suggested by [facility] gastroenterology

## 2020-08-12 NOTE — ED NOTES
Report called to [facility] for room 1481.      Jessika Harris, [address] [facility]  08/12/20 9391

## 2020-08-12 NOTE — H&P
HISTORY AND PHYSICAL  (Hospitalist, Internal Medicine)  IDENTIFYING INFORMATION   PATIENT:  Sheliah Carrel  MRN:  [id number]  ADMIT DATE: 8/11/2020  TIME OF EVALUATION: 8/11/2020 11:33 PM    CHIEF COMPLAINT     Abdominal pain  HISTORY OF PRESENT ILLNESS   Sheliah Carrel is a 62 y.o. female admitted for abdominal pain this been going on for intermittently for 4 weeks, states that she has very little bowel movements, however just had one in the ED on a bedside commode. She states it is less than a finger length, and which has been her norm for about 4 months. Today her abdominal pain became worse and persistent, patient denies any other complaints, denies any nausea or vomiting. Pt otherwise has no complaints of CP, SOB, dizziness,  dysuria, joint pains, rash/boils, or fevers. PMH listed below:    PAST MEDICAL, SURGICAL, FAMILY, and SOCIAL HISTORY     Past Medical History:   Diagnosis Date    Amphetamine abuse ([facility] 2020    Cocaine abuse ([facility] 2020    NSTEMI (non-ST elevated myocardial infarction) ([facility] 04/2020     History reviewed. No pertinent surgical history. History reviewed. No pertinent family history. Family Hx of HTN  Family Hx as reviewed above, otherwise non-contributory  Social History     Socioeconomic History    Marital status:       Spouse name: None    Number of children: None    Years of education: None    Highest education level: None   Occupational History    None   Social Needs    Financial resource strain: None    Food insecurity     Worry: None     Inability: None    Transportation needs     Medical: None     Non-medical: None   Tobacco Use    Smoking status: Current Every Day Smoker    Smokeless tobacco: Never Used   Substance and Sexual Activity    Alcohol use: Never     Frequency: Never    Drug use: Yes     Types: Cocaine, Marijuana, Opiates , Methamphetamines     Comment: 2020 based on UDS    Sexual activity: None   Lifestyle    Physical activity     Days per week: None     Minutes per session: None    Stress: None   Relationships    Social connections     Talks on phone: None     Gets together: None     Attends Faith service: None     Active member of club or organization: None     Attends meetings of clubs or organizations: None     Relationship status: None    Intimate partner violence     Fear of current or ex partner: None     Emotionally abused: None     Physically abused: None     Forced sexual activity: None   Other Topics Concern    None   Social History Narrative    None       MEDICATIONS   Medications Prior to Admission  Not in a hospital admission. Current Medications  Current Facility-Administered Medications   Medication Dose Route Frequency Provider Last Rate Last Dose    sodium chloride flush 0.9 % injection 10 mL  10 mL Intravenous BID Ramu Richards,          Current Outpatient Medications   Medication Sig Dispense Refill    cephALEXin (KEFLEX) 500 MG capsule Take 1 capsule by mouth 4 times daily for 10 days 40 capsule 0    sulfamethoxazole-trimethoprim (BACTRIM DS) 800-160 MG per tablet Take 1 tablet by mouth 2 times daily for 10 days 20 tablet 0    amLODIPine (NORVASC) 5 MG tablet Take 1 tablet by mouth daily 30 tablet 3    traZODone (DESYREL) 50 MG tablet Take 50 mg by mouth nightly      mometasone-formoterol (DULERA) 100-5 MCG/ACT inhaler Inhale 2 puffs into the lungs 2 times daily      fluticasone (FLONASE) 50 MCG/ACT nasal spray 2 sprays by Each Nostril route daily      levothyroxine (SYNTHROID) 100 MCG tablet Take 100 mcg by mouth Daily      gabapentin (NEURONTIN) 800 MG tablet Take 800 mg by mouth 3 times daily.       promethazine (PHENERGAN) 25 MG tablet Take 25 mg by mouth every 6 hours as needed for Nausea      albuterol sulfate HFA (VENTOLIN HFA) 108 (90 Base) MCG/ACT inhaler Inhale 1 puff into the lungs every 6 hours as needed for Wheezing      escitalopram (LEXAPRO) 20 MG tablet Take 20 mg by mouth daily      docusate sodium (COLACE) 100 MG capsule Take 100 mg by mouth 2 times daily      atorvastatin (LIPITOR) 80 MG tablet Take 80 mg by mouth nightly      aspirin 81 MG chewable tablet Take 81 mg by mouth daily      carvedilol (COREG) 3.125 MG tablet Take 3.125 mg by mouth 2 times daily (with meals)      traMADol (ULTRAM) 50 MG tablet Take 50 mg by mouth every 6 hours as needed for Pain.  clopidogrel (PLAVIX) 75 MG tablet Take 75 mg by mouth daily      fluticasone-salmeterol (ADVAIR) 250-50 MCG/DOSE AEPB Inhale 1 puff into the lungs 2 times daily           Allergies  No Known Allergies    REVIEW OF SYSTEMS   Within above limitations. 14 point review of systems reviewed. Pertinent positive or negative as per HPI or otherwise negative per 14 point systems review. PHYSICAL EXAM     Wt Readings from Last 3 Encounters:   08/11/20 150 lb (68 kg)   08/07/20 151 lb (68.5 kg)   05/08/20 183 lb 1.6 oz (83.1 kg)       Blood pressure 122/71, pulse 61, temperature 97.5 °F (36.4 °C), temperature source Oral, resp. rate 26, height 5' 2\" (1.575 m), weight 150 lb (68 kg), SpO2 99 %. General - AAO x 3  Psych - Appropriate affect/speech. No agitation  Eyes - Eye lids intact. No scleral icterus  ENT - Lips wnl. External ear clear/dry/intact. No thyromegaly on inspection  Neuro - No gross peripheral or central neuro deficits on inspection  Heart - Sinus. RRR. S1 and S2 present. No added HS/murmurs appreciated. No elevated JVD appreciated  Lung - Adequate air entry b/l, No crackles/wheezes appreciated  GI - Soft. No guarding/rigidity. No hepatosplenomegaly/ascites. BS+; nondistended, nontender   - No CVA/suprapubic tenderness or palpable bladder distension  Skin - Intact. No rash/petechiae/ecchymosis. Warm extremities  MSK - Joints with normal ROM.  No joint swellings    Lines/Drains/Airways/Wounds:  [unfilled]    LABS AND IMAGING   CBC  [unfilled]    Last 3 Hemoglobin  Lab Results   Component Value Date    HGB 13.3 08/11/2020 HGB 12.3 08/06/2020    HGB 12.8 08/04/2020     Last 3 WBC/ANC  Lab Results   Component Value Date    WBC 10.0 08/11/2020    WBC 6.0 08/06/2020    WBC 7.4 08/04/2020     No components found for: GRNLOCTYABS  Last 3 Platelets  No results found for: PLATELET  Chemistry  [unfilled]  [unfilled]  No results found for: LDH  Coagulation Studies  Lab Results   Component Value Date    INR 0.90 08/11/2020     Liver Function Studies  Lab Results   Component Value Date    ALT 11 08/11/2020    AST 13 08/11/2020    ALKPHOS 67 08/11/2020       Recent Imaging        Relevant labs and imaging reviewed    ASSESSMENT AND PLAN   Kashif Church is a 62 y.o. female p/w    Abdominal pain/consitipation  - likely d/t large bowel obstruction  - elevated lactate, trend  - general surgery consulted by ED, will see in AM, conservative management at this time, NPO  - IVF   -GI consulted by ED, follow-up      Chronic conditions:  H/o amphetamine use  CAD   Hold ASA, plavix, c/w BB, consulted cardiology for recommendation given recent stent placed 2 months ago  Hypothyroidism   Continue synthroid  Hepatitis C  HLD  COPD    Continue home inhalers  HTN   BB  Nicotine use    DVT Prophylaxis: SCD, pending possibility of need of surgery  Diet: NPO  Code Status: Full    [address], Internal Medicine  8/11/2020 at 11:33 PM

## 2020-08-12 NOTE — ED NOTES
Patient glucose 66. Dr. Taylor Constantino notified and verbal order for half an amp of D50.       ISREAL Hewitt  08/11/20 4171

## 2020-08-12 NOTE — PROGRESS NOTES
Hospitalist Progress Note      Name:  Gerson Yao /Age/Sex: 1960  (62 y.o. female)   MRN & CSN:  [id number] & [id number] Admission Date/Time: 2020  8:42 PM   Location:  [address] [place] [address] PCP: Ermelinda Palomino. Alex Mendez 59 Day: 2    History of Present Illness:     Chief Complaint: Gerson Yao is a 62 y.o.  female  who presents with abdominal pain     The patient seen and examined at bedside. She is curled to the side seems to be in mild distress. She has been complaining of abdominal pain.     Ten point ROS reviewed negative, unless as noted above    Objective:   No intake or output data in the 24 hours ending 20 1133   Vitals:   Vitals:    20 0855   BP: (!) 176/98   Pulse: 59   Resp: 23   Temp: 97.5 °F (36.4 °C)   SpO2: 99%     Physical Exam:   General Appearance: alert and oriented to person, place and time, in no acute distress  Cardiovascular: normal rate, regular rhythm, normal S1 and S2  Pulmonary/Chest: clear to auscultation bilaterally- no wheezes, rales or rhonchi, normal air movement, no respiratory distress  Abdomen: soft, + generalized abdominal tenderness, non-distended, normal bowel sounds, no masses   Extremities: no cyanosis, clubbing or edema, pulse   Skin: warm and dry, no rash or erythema  Head: normocephalic and atraumatic  Eyes: pupils equal, round, and reactive to light  Neck: supple and non-tender without mass, no thyromegaly   Musculoskeletal: normal range of motion, no joint swelling, deformity or tenderness  Neurological: alert, oriented, normal speech, no focal findings or movement disorder noted    Medications:   Medications:    sodium chloride flush  10 mL Intravenous 2 times per day    carvedilol  3.125 mg Oral BID WC    amLODIPine  5 mg Oral Daily    escitalopram  20 mg Oral Daily    levothyroxine  100 mcg Oral Daily    budesonide-formoterol  2 puff Inhalation BID    gabapentin  800 mg Oral TID    cefTRIAXone (ROCEPHIN) IV  1 g Intravenous Q24H    metroNIDAZOLE  500 mg Intravenous Q8H    sodium chloride flush  10 mL Intravenous BID      Infusions:    lactated ringers 100 mL/hr at 08/12/20 0046     PRN Meds: sodium chloride flush, 10 mL, PRN  acetaminophen, 650 mg, Q6H PRN    Or  acetaminophen, 650 mg, Q6H PRN  magnesium hydroxide, 30 mL, Daily PRN  promethazine, 12.5 mg, Q6H PRN    Or  ondansetron, 4 mg, Q6H PRN  morphine, 2 mg, Q4H PRN            Pertinent New Labs & Imaging Studies     CBC with Differential:    Lab Results   Component Value Date    WBC 10.8 08/12/2020    RBC 4.60 08/12/2020    HGB 13.6 08/12/2020    HCT 42.6 08/12/2020     08/12/2020    MCV 92.6 08/12/2020    MCH 29.6 08/12/2020    MCHC 31.9 08/12/2020    RDW 12.6 08/12/2020    SEGSPCT 83.0 08/12/2020    BANDSPCT 28 05/06/2020    LYMPHOPCT 10.6 08/12/2020    MONOPCT 5.5 08/12/2020    BASOPCT 0.4 08/12/2020    MONOSABS 0.6 08/12/2020    LYMPHSABS 1.2 08/12/2020    EOSABS 0.0 08/12/2020    BASOSABS 0.0 08/12/2020    DIFFTYPE AUTOMATED DIFFERENTIAL 08/12/2020     CMP:    Lab Results   Component Value Date     08/12/2020    K 4.1 08/12/2020     08/12/2020    CO2 22 08/12/2020    BUN 10 08/12/2020    CREATININE 0.5 08/12/2020    GFRAA >60 08/12/2020    LABGLOM >60 08/12/2020    GLUCOSE 126 08/12/2020    PROT 6.2 08/12/2020    LABALBU 2.8 08/12/2020    CALCIUM 8.2 08/12/2020    BILITOT 0.2 08/12/2020    ALKPHOS 95 08/12/2020    AST 40 08/12/2020    ALT 30 08/12/2020     Ct Abdomen Pelvis W Iv Contrast Additional Contrast? None    Result Date: 8/12/2020  EXAMINATION: CT OF THE ABDOMEN AND PELVIS WITH CONTRAST 8/11/2020 10:17 pm TECHNIQUE: CT of the abdomen and pelvis was performed with the administration of intravenous contrast. Multiplanar reformatted images are provided for review. Dose modulation, iterative reconstruction, and/or weight based adjustment of the mA/kV was utilized to reduce the radiation dose to as low as reasonably achievable. COMPARISON: None. HISTORY: ORDERING SYSTEM PROVIDED HISTORY: nausea, vomiting TECHNOLOGIST PROVIDED HISTORY: Reason for exam:->nausea, vomiting Additional Contrast?->None Reason for Exam: midline abd pain FINDINGS: Lower Chest: Nodular opacities are seen within the right middle lobe. Findings could be related to an atypical pneumonia. Organs: There has been a cholecystectomy. The liver, spleen, adrenal glands, pancreas, and kidneys are unremarkable. GI/Bowel: The small bowel loops are within normal limits in caliber. The appendix is normal.  Mild colonic diverticulosis is seen. There is a focal area of abrupt narrowing involving the splenic flexure/distal transverse colon with adjacent pericolonic inflammatory changes. This results in large bowel obstruction with the transverse colon measuring 6.7 cm. This could represent focal area of stricture, acute infection, however neoplasm cannot be entirely excluded. No pericolonic lymph nodes are identified. This focal area of narrowing measures approximately 8 cm in length. Pelvis: The bladder is unremarkable. There is no free fluid. Peritoneum/Retroperitoneum: There is no free air or lymphadenopathy. Bones/Soft Tissues: No destructive osseous lesions are identified. 1. Focal area of abrupt narrowing involving the splenic flexure/distal transverse colon with mucosal thickening and pericolonic stranding measuring approximately 8 cm in length resulting in large bowel obstruction. The transverse colon proximally measures 6.7 cm. This focal area could represent a stricture, an area of acute on chronic colitis/diverticulitis, however neoplasm cannot be entirely excluded. Further evaluation with colonoscopy with surgical evaluation is recommended. 2. No evidence of free air. 3. Focal nodular opacities are noted within the right middle lobe of uncertain etiology.   This should be further evaluated with CT chest.       Assessment and Plan:   Dasha Rosales is a 62 y.o.  female  who

## 2020-08-12 NOTE — PROGRESS NOTES
Cardiology Consult Note      Reason for consultation: DAPT management    Chief complaint : Abdominal pain    Referring physician: Dr. Skylar Retana    Primary care physician: Lana Martínez DO      History of Present Illness:   Mrs. Sophie Kramer is a 61-year-old  female who has a history of amphetamine use, cocaine abuse, colitis, tobacco abuse, hypertension, hyperlipidemia, and STEMI 5/11/2020 with PCI of OM and LAD. Patient reports to the hospital with complaints of abdominal pain. She had been seen at [facility] recently due to the abdominal pain and was being worked up for GI procedure. Cardiology was consulted due to need for evaluation of DAPT if she where to need surgery. Patient had left heart cath 5/11/2020 with stent placement to OM and LAD [place]. Nuc med stress test 8/7/2020 normal with ejection fraction of 60%. Patient states that the last time that she used cocaine was a few weeks ago. She does not admit to using amphetamines recently. Patient denies chest pain shortness of breath palpitations dizziness edema orthopnea or syncope    Pastmedical history:   Past Medical History:   Diagnosis Date    Amphetamine abuse ([facility] [address] 2020    Cocaine abuse ([facility] [address] 2020    NSTEMI (non-ST elevated myocardial infarction) ([facility] [address] 04/2020       Surgical history : History reviewed. No pertinent surgical history. Family history: History reviewed. No pertinent family history. Social history :  reports that she has been smoking. She has never used smokeless tobacco. She reports current drug use. Drugs: Cocaine, Marijuana, Opiates , and Methamphetamines. She reports that she does not drink alcohol. No Known Allergies    No current facility-administered medications on file prior to encounter.       Current Outpatient Medications on File Prior to Encounter   Medication Sig Dispense Refill    cephALEXin (KEFLEX) 500 MG capsule Take 1 capsule by mouth 4 times daily for 10 days 40 capsule 0    sulfamethoxazole-trimethoprim (BACTRIM DS) 800-160 MG per tablet Take 1 tablet by mouth 2 times daily for 10 days 20 tablet 0    amLODIPine (NORVASC) 5 MG tablet Take 1 tablet by mouth daily 30 tablet 3    traZODone (DESYREL) 50 MG tablet Take 50 mg by mouth nightly      mometasone-formoterol (DULERA) 100-5 MCG/ACT inhaler Inhale 2 puffs into the lungs 2 times daily      fluticasone (FLONASE) 50 MCG/ACT nasal spray 2 sprays by Each Nostril route daily      levothyroxine (SYNTHROID) 100 MCG tablet Take 100 mcg by mouth Daily      gabapentin (NEURONTIN) 800 MG tablet Take 800 mg by mouth 3 times daily.  promethazine (PHENERGAN) 25 MG tablet Take 25 mg by mouth every 6 hours as needed for Nausea      albuterol sulfate HFA (VENTOLIN HFA) 108 (90 Base) MCG/ACT inhaler Inhale 1 puff into the lungs every 6 hours as needed for Wheezing      escitalopram (LEXAPRO) 20 MG tablet Take 20 mg by mouth daily      docusate sodium (COLACE) 100 MG capsule Take 100 mg by mouth 2 times daily      atorvastatin (LIPITOR) 80 MG tablet Take 80 mg by mouth nightly      aspirin 81 MG chewable tablet Take 81 mg by mouth daily      carvedilol (COREG) 3.125 MG tablet Take 3.125 mg by mouth 2 times daily (with meals)      traMADol (ULTRAM) 50 MG tablet Take 50 mg by mouth every 6 hours as needed for Pain.  clopidogrel (PLAVIX) 75 MG tablet Take 75 mg by mouth daily      fluticasone-salmeterol (ADVAIR) 250-50 MCG/DOSE AEPB Inhale 1 puff into the lungs 2 times daily         Review of Systems:   Review of Systems   Constitutional: Negative for fatigue and fever. HENT: Negative for ear pain and sinus pain. Eyes: Negative for photophobia and visual disturbance. Respiratory: Negative for cough and shortness of breath. Cardiovascular: Negative for chest pain, palpitations and leg swelling. Gastrointestinal: Positive for abdominal pain, constipation, nausea and vomiting. Negative for blood in stool and diarrhea. Endocrine: Negative for polyphagia and polyuria. Genitourinary: Negative for decreased urine volume and difficulty urinating. Musculoskeletal: Negative for arthralgias and gait problem. Skin: Negative for color change and wound. Neurological: Negative for dizziness, syncope, weakness, light-headedness and headaches. Psychiatric/Behavioral: Negative for agitation. The patient is not hyperactive. Examination:      Vitals:    08/12/20 0225 08/12/20 0302 08/12/20 0730 08/12/20 0855   BP:  (!) 170/98 (!) 156/98 (!) 176/98   Pulse: 90 61 63 59   Resp: 23  18 23   Temp:    97.5 °F (36.4 °C)   TempSrc:    Oral   SpO2: 100%  96% 99%   Weight:       Height:            Body mass index is 27.44 kg/m². Physical Exam  Vitals signs reviewed. Constitutional:       General: She is not in acute distress. Appearance: Normal appearance. She is not ill-appearing. HENT:      Head: Normocephalic and atraumatic. Eyes:      Pupils: Pupils are equal, round, and reactive to light. Neck:      Musculoskeletal: Neck supple. No muscular tenderness. Vascular: No carotid bruit. Cardiovascular:      Rate and Rhythm: Normal rate and regular rhythm. Pulses: Normal pulses. Heart sounds: Normal heart sounds. No murmur. Pulmonary:      Effort: Pulmonary effort is normal. No respiratory distress. Breath sounds: Normal breath sounds. Musculoskeletal:         General: No swelling or deformity. Skin:     General: Skin is warm and dry. Capillary Refill: Capillary refill takes less than 2 seconds. Neurological:      Mental Status: She is alert and oriented to person, place, and time. Psychiatric:         Mood and Affect: Mood normal.         Behavior: Behavior normal.         Thought Content:  Thought content normal.         Judgment: Judgment normal.         CBC:   Lab Results   Component Value Date    WBC 10.8 08/12/2020    HGB 13.6 the hospital care given to date and reviewed all pertinent labs and imaging. The plan was developed mutually at the time of the visit with the patient,  NP  and myself. I have spoken with patient, nursing staff and provided written and verbal instructions . The above note has been reviewed and I agree with the assessment, diagnosis, and treatment plan with changes made by me as follows     CARDIOLOGY ATTENDING ADDENDUM    HPI:  I have reviewed the above HPI  And agree with above   Billie Broderick is a 62 y. o.year old who and presents with had concerns including Rectal Bleeding (on plavix, hx of hemrrhoids, states \"for a couple months\" ); Constipation (states been 1 week); Abdominal Pain (\"all over pain\", started today ); and Hypotension (92/68 upon arrival ). Chief Complaint   Patient presents with    Rectal Bleeding     on plavix, hx of hemrrhoids, states \"for a couple months\"     Constipation     states been 1 week    Abdominal Pain     \"all over pain\", started today     Hypotension     92/68 upon arrival      Interval history:  Mild abd pain    Physical Exam:  General:  Awake, alert, NAD  Head:normal  Eye:normal  Neck:  No JVD   Chest:  Clear to auscultation, respiration easy  Cardiovascular:  RRR S1S2  Abdomen:   nontender  Extremities:  no edema  Pulses; palpable  Neuro: grossly normal      MEDICAL DECISION MAKING;    I agree with the above plan, which was planned by myself and discussed with NP.   Had PCI recently  If antiplatelet need dced will need Integrilin bridging  Will VIK Arita MD [facility] [place]

## 2020-08-12 NOTE — ED PROVIDER NOTES
Emergency Department Encounter    Patient: Gerson Yao  MRN: [id number]  : 1960  Date of Evaluation: 2020  ED Provider:  Brit Allen    Triage Chief Complaint:   Rectal Bleeding (on plavix, hx of hemrrhoids, states \"for a couple months\" ); Constipation (states been 1 week); Abdominal Pain (\"all over pain\", started today ); and Hypotension (92/68 upon arrival )      [place]:  Gerson Yao is a 62 y.o. female that presents to the emergency department for evaluation of constipation. She is brought in via EMS and report is initially provided by paramedics who state that they were dispatched to the patient to transport her to our emergency department for evaluation of constipation. Patient notes that for the past 4 weeks she has had \"abdominal issues. \"  Notes that she has had intermittent rectal bleeding for the past month. She notices bright red blood, especially when she wipes. She states that she has been straining to have a bowel movement. She notes that she does hemorrhoids. Her last bowel movement was 1 week ago. Patient notes pain throughout her abdomen. She describes a dull, achy pain. She cannot identify any precipitating or alleviating features. No change in pain with food. She has not had much of an appetite. Denies any recent weight loss. Denies history of gastroesophageal, colorectal, hepatobiliary malignancy. Her last colonoscopy was approximately 5 years ago. She admits to associated nausea and has had a couple episodes of nonbloody nonbilious emesis over the past 24 hours. Denies melena. Denies any recent steroid use, NSAID use, antibiotic use. Denies fevers, however, patient does experience chills. Denies diaphoresis or night sweats. Denies chest pain, shortness of breath, pleuritic pain. Denies additional precipitating, modifying, alleviating factors.     ROS - see HPI, below listed is current ROS at time of my eval:  A complete 10 point review of systems is performed and is as dictated above otherwise negative. Past Medical History:   Diagnosis Date    Amphetamine abuse ([facility] [address] 2020    Cocaine abuse ([facility] [address] 2020    NSTEMI (non-ST elevated myocardial infarction) ([facility] 04/2020     History reviewed. No pertinent surgical history. History reviewed. No pertinent family history. Social History     Socioeconomic History    Marital status:       Spouse name: Not on file    Number of children: Not on file    Years of education: Not on file    Highest education level: Not on file   Occupational History    Not on file   Social Needs    Financial resource strain: Not on file    Food insecurity     Worry: Not on file     Inability: Not on file    Transportation needs     Medical: Not on file     Non-medical: Not on file   Tobacco Use    Smoking status: Current Every Day Smoker    Smokeless tobacco: Never Used   Substance and Sexual Activity    Alcohol use: Never     Frequency: Never    Drug use: Yes     Types: Cocaine, Marijuana, Opiates , Methamphetamines     Comment: 2020 based on UDS    Sexual activity: Not on file   Lifestyle    Physical activity     Days per week: Not on file     Minutes per session: Not on file    Stress: Not on file   Relationships    Social connections     Talks on phone: Not on file     Gets together: Not on file     Attends Jewish service: Not on file     Active member of club or organization: Not on file     Attends meetings of clubs or organizations: Not on file     Relationship status: Not on file    Intimate partner violence     Fear of current or ex partner: Not on file     Emotionally abused: Not on file     Physically abused: Not on file     Forced sexual activity: Not on file   Other Topics Concern    Not on file   Social History Narrative    Not on file     Current Facility-Administered Medications   Medication Dose Route Frequency Provider Last Rate Last Dose    acetaminophen (TYLENOL) tablet 650 mg  650 mg Oral Q6H PRN Peter Tia Mccray MD        Or    acetaminophen (TYLENOL) suppository 650 mg  650 mg Rectal Q6H PRN Peter Tia Mccray MD        promethazine (PHENERGAN) tablet 12.5 mg  12.5 mg Oral Q6H PRN Peter Tia Mccray MD   12.5 mg at 08/12/20 0142    Or    ondansetron (ZOFRAN) injection 4 mg  4 mg Intravenous Q6H PRN Peter Tia Mccray MD        lactated ringers infusion   Intravenous Continuous Peter Tia Mccray  mL/hr at 08/12/20 0046      budesonide-formoterol (SYMBICORT) 160-4.5 MCG/ACT inhaler 2 puff  2 puff Inhalation BID Peter Tia Mccray MD        gabapentin (NEURONTIN) capsule 800 mg  800 mg Oral TID Peteraretha Mccray MD        cefTRIAXone (ROCEPHIN) 1 g IVPB in 50 mL D5W minibag  1 g Intravenous Q24H Ramu Richards,  mL/hr at 08/12/20 0143 1 g at 08/12/20 0143    metronidazole (FLAGYL) 500 mg in NaCl 100 mL IVPB premix  500 mg Intravenous Q8H Ramu Richards, DO        sodium chloride flush 0.9 % injection 10 mL  10 mL Intravenous BID Ramu Richards, DO         Current Outpatient Medications   Medication Sig Dispense Refill    cephALEXin (KEFLEX) 500 MG capsule Take 1 capsule by mouth 4 times daily for 10 days 40 capsule 0    sulfamethoxazole-trimethoprim (BACTRIM DS) 800-160 MG per tablet Take 1 tablet by mouth 2 times daily for 10 days 20 tablet 0    amLODIPine (NORVASC) 5 MG tablet Take 1 tablet by mouth daily 30 tablet 3    traZODone (DESYREL) 50 MG tablet Take 50 mg by mouth nightly      mometasone-formoterol (DULERA) 100-5 MCG/ACT inhaler Inhale 2 puffs into the lungs 2 times daily      fluticasone (FLONASE) 50 MCG/ACT nasal spray 2 sprays by Each Nostril route daily      levothyroxine (SYNTHROID) 100 MCG tablet Take 100 mcg by mouth Daily      gabapentin (NEURONTIN) 800 MG tablet Take 800 mg by mouth 3 times daily.       promethazine (PHENERGAN) 25 MG tablet Take 25 mg by mouth every 6 hours as needed for Nausea      albuterol sulfate HFA (VENTOLIN HFA) 108 (90 Base) MCG/ACT inhaler Inhale 1 puff into the lungs every 6 hours as needed for Wheezing      escitalopram (LEXAPRO) 20 MG tablet Take 20 mg by mouth daily      docusate sodium (COLACE) 100 MG capsule Take 100 mg by mouth 2 times daily      atorvastatin (LIPITOR) 80 MG tablet Take 80 mg by mouth nightly      aspirin 81 MG chewable tablet Take 81 mg by mouth daily      carvedilol (COREG) 3.125 MG tablet Take 3.125 mg by mouth 2 times daily (with meals)      traMADol (ULTRAM) 50 MG tablet Take 50 mg by mouth every 6 hours as needed for Pain.  clopidogrel (PLAVIX) 75 MG tablet Take 75 mg by mouth daily      fluticasone-salmeterol (ADVAIR) 250-50 MCG/DOSE AEPB Inhale 1 puff into the lungs 2 times daily       No Known Allergies    Nursing Notes Reviewed    Physical Exam:  Triage VS:    ED Triage Vitals [08/11/20 2052]   Enc Vitals Group      BP 92/68      Pulse 56      Resp 16      Temp 97.5 °F (36.4 °C)      Temp Source Oral      SpO2 94 %      Weight 150 lb (68 kg)      Height 5' 2\" (1.575 m)      Head Circumference       Peak Flow       Pain Score       Pain Loc       Pain Edu? Excl. in [address]? My pulse ox interpretation is - normal    General appearance: Patient is awake, alert, oriented. She is following commands and answering questions. GCS is 15. She does appear to be distressed secondary to abdominal pain. Nontoxic in appearance. Skin:  Warm. Dry. Intact. No track marks. No Janeway lesions, Osler nodes, splinter hemorrhages. Neck: Supple. No nuchal rigidity. Trachea is midline. No masses or thyromegaly. No JVD. No carotid thrills or bruits. Heart: Bradycardic rate and regular rhythm. Audible S1 and S2. No audible murmurs, rubs, gallops. Perfusion: Symmetric peripheral pulses. Respiratory:  Lungs clear to auscultation bilaterally. Respirations nonlabored. Abdominal: Soft. Nondistended. Tender to palpation diffusely. No guarding, rigidity, rebound tenderness. Bowel sounds are auscultated in all 4 quadrants.   No midline pulsatile abdominal masses, thrills, bruits. Rectal: Hemorrhoids are visualized. No gross bleeding. No fissures. No signs of rectal trauma. No thrombosed hemorrhoids. Back:  No CVA TTP. Could have Fabián pendulous bilateral.  Extremity:  normal ROM in all 4 extremities. Neurological:  Alert and oriented times 3.       I have reviewed and interpreted all of the currently available lab results from this visit (if applicable):  Results for orders placed or performed during the hospital encounter of 08/11/20   CBC Auto Differential   Result Value Ref Range    WBC 10.0 4.0 - 10.5 K/CU MM    RBC 4.34 4.2 - 5.4 M/CU MM    Hemoglobin 13.3 12.5 - 16.0 GM/DL    Hematocrit 39.9 37 - 47 %    MCV 91.9 78 - 100 FL    MCH 30.6 27 - 31 PG    MCHC 33.3 32.0 - 36.0 %    RDW 12.7 11.7 - 14.9 %    Platelets 980 070 - 248 K/CU MM    MPV 8.1 7.5 - 11.1 FL    Differential Type AUTOMATED DIFFERENTIAL     Segs Relative 48.2 36 - 66 %    Lymphocytes % 39.5 24 - 44 %    Monocytes % 7.8 (H) 0 - 4 %    Eosinophils % 3.6 (H) 0 - 3 %    Basophils % 0.3 0 - 1 %    Segs Absolute 4.8 K/CU MM    Lymphocytes Absolute 4.0 K/CU MM    Monocytes Absolute 0.8 K/CU MM    Eosinophils Absolute 0.4 K/CU MM    Basophils Absolute 0.0 K/CU MM    Nucleated RBC % 0.0 %    Total Nucleated RBC 0.0 K/CU MM    Total Immature Neutrophil 0.06 K/CU MM    Immature Neutrophil % 0.6 (H) 0 - 0.43 %   Comprehensive Metabolic Panel w/ Reflex to MG   Result Value Ref Range    Sodium 134 (L) 135 - 145 MMOL/L    Potassium 3.6 3.5 - 5.1 MMOL/L    Chloride 98 (L) 99 - 110 mMol/L    CO2 27 21 - 32 MMOL/L    BUN 10 6 - 23 MG/DL    CREATININE 0.8 0.6 - 1.1 MG/DL    Glucose 52 (L) 70 - 99 MG/DL    Calcium 9.0 8.3 - 10.6 MG/DL    Alb 3.4 3.4 - 5.0 GM/DL    Total Protein 6.5 6.4 - 8.2 GM/DL    Total Bilirubin 0.2 0.0 - 1.0 MG/DL    ALT 11 10 - 40 U/L    AST 13 (L) 15 - 37 IU/L    Alkaline Phosphatase 67 40 - 129 IU/L    GFR Non-African American >60 >60 mL/min/1.73m2    GFR African American >60 >60 mL/min/1.73m2    Anion Gap 9 4 - 16   Protime-INR   Result Value Ref Range    Protime 10.9 (L) 11.7 - 14.5 SECONDS    INR 0.90 INDEX   APTT   Result Value Ref Range    aPTT 27.8 25.1 - 37.1 SECONDS   Lactic Acid, Plasma   Result Value Ref Range    Lactate 2.2 (HH) 0.4 - 2.0 mMOL/L   Urinalysis Reflex to Culture    Specimen: Urine   Result Value Ref Range    Color, UA YELLOW YELLOW    Clarity, UA CLEAR CLEAR    Glucose, Urine NEGATIVE NEGATIVE MG/DL    Bilirubin Urine NEGATIVE NEGATIVE MG/DL    Ketones, Urine NEGATIVE NEGATIVE MG/DL    Specific Gravity, UA 1.015 1.001 - 1.035    Blood, Urine SMALL (A) NEGATIVE    pH, Urine 6.0 5.0 - 8.0    Protein, UA NEGATIVE NEGATIVE MG/DL    Urobilinogen, Urine NORMAL 0.2 - 1.0 MG/DL    Nitrite Urine, Quantitative NEGATIVE NEGATIVE    Leukocyte Esterase, Urine NEGATIVE NEGATIVE    RBC, UA <1 0 - 6 /HPF    WBC, UA <1 0 - 5 /HPF    Bacteria, UA NEGATIVE NEGATIVE /HPF    Yeast, UA RARE /HPF    Squam Epithel, UA 2 /HPF    Trichomonas, UA NONE SEEN NONE SEEN /HPF    Hyaline Casts, UA 0 /LPF   Bilirubin total direct & indirect   Result Value Ref Range    Total Bilirubin 0.2 0.0 - 1.0 MG/DL    Bilirubin, Direct 0.2 0.0 - 0.3 MG/DL    Bilirubin, Indirect 0.0 0 - 0.7 MG/DL   Lactic Acid, Plasma   Result Value Ref Range    Lactate 2.6 (HH) 0.4 - 2.0 mMOL/L   POCT Glucose   Result Value Ref Range    Glucose 66 mg/dL   POCT Glucose   Result Value Ref Range    POC Glucose 66 (L) 70 - 99 MG/DL   POCT Glucose   Result Value Ref Range    POC Glucose 90 70 - 99 MG/DL   EKG 12 Lead   Result Value Ref Range    Ventricular Rate 57 BPM    Atrial Rate 57 BPM    P-R Interval 188 ms    QRS Duration 90 ms    Q-T Interval 438 ms    QTc Calculation (Bazett) 426 ms    P Axis 32 degrees    R Axis 57 degrees    T Axis 69 degrees    Diagnosis       Sinus bradycardia  Otherwise normal ECG  When compared with ECG of 07-AUG-2020 11:37,  T wave inversion now evident in Anterior leads TYPE AND SCREEN   Result Value Ref Range    ABO/Rh O POSITIVE     Antibody Screen NEGATIVE       Radiographs (if obtained):  Radiologist's Report Reviewed:  Ct Abdomen Pelvis W Iv Contrast Additional Contrast? None    Result Date: 8/11/2020  EXAMINATION: CT OF THE ABDOMEN AND PELVIS WITH CONTRAST 8/11/2020 10:17 pm TECHNIQUE: CT of the abdomen and pelvis was performed with the administration of intravenous contrast. Multiplanar reformatted images are provided for review. Dose modulation, iterative reconstruction, and/or weight based adjustment of the mA/kV was utilized to reduce the radiation dose to as low as reasonably achievable. COMPARISON: None. HISTORY: ORDERING SYSTEM PROVIDED HISTORY: nausea, vomiting TECHNOLOGIST PROVIDED HISTORY: Reason for exam:->nausea, vomiting Additional Contrast?->None Reason for Exam: midline abd pain FINDINGS: Lower Chest: Nodular opacities are seen within the right middle lobe. Findings could be related to an atypical pneumonia. Organs: There has been a cholecystectomy. The liver, spleen, adrenal glands, pancreas, and kidneys are unremarkable. GI/Bowel: The small bowel loops are within normal limits in caliber. The appendix is normal.  Mild colonic diverticulosis is seen. There is a focal area of abrupt narrowing involving the splenic flexure/distal transverse colon with adjacent pericolonic inflammatory changes. This results in large bowel obstruction with the transverse colon measuring 6.7 cm. This could represent focal area of stricture, acute infection, however neoplasm cannot be entirely excluded. No pericolonic lymph nodes are identified. This focal area of narrowing measures approximately 8 cm in length. Pelvis: The bladder is unremarkable. There is no free fluid. Peritoneum/Retroperitoneum: There is no free air or lymphadenopathy. Bones/Soft Tissues: No destructive osseous lesions are identified.      1. Focal area of abrupt narrowing involving the splenic flexure/distal transverse colon with mucosal thickening and pericolonic stranding measuring approximately 8 cm in length resulting in large bowel obstruction. The transverse colon proximally measures 6.7 cm. This focal area could represent a stricture, an area of acute on chronic colitis/diverticulitis, however neoplasm cannot be entirely excluded. Further evaluation with colonoscopy with surgical evaluation is recommended. 2. No evidence of free air. 3. Focal nodular opacities are noted within the right middle lobe of uncertain etiology. This should be further evaluated with CT chest.       EKG (if obtained): (All EKG's are interpreted by myself in the absence of a cardiologist). EKG performed on 8/11/2020 at 8:55 PM demonstrates ventricular rate of 57 bpm in sinus bradycardia. No ST elevations or depressions. No signs of acute ischemia, infarct, high degree heart block. EKG is compared to previous EKG performed on 8/7/2020. There is T wave inversion in lead V2 at this time. Patient denies any active chest pain. MDM:  Patient was seen and evaluated in the emergency department by myself. A thorough history and physical exam were performed, prior medical records reviewed. Upon arrival to the emergency department patient's vital signs were noted. He was bradycardic at 56 bpm.  No tachypnea or hypoxia. Blood pressure within normal limits. She was febrile with a temperature of 101.4 °F.  She was connected to continuous cardiopulmonary monitoring. Rhythm strip was interpreted by myself demonstrating sinus bradycardia. EKG was performed, interpreted by myself, as detailed above. Differential diagnosis and treatment plan were discussed. IV access established and the patient was initiated on 1 L bolus of 0.9% normal saline. Intravenous morphine and fentanyl were provided for pain. Pertinent labs were drawn and radiographic studies were performed. Once results were both reviewed by myself.   Labs demonstrate no leukocytosis, anemia, thrombocytopenia. Electrolytes demonstrate mild hyponatremia with a sodium of 134. She is also hypochloremic with a chloride of 98. Potassium and chloride are within normal limits. AST and ALT are unremarkable. Alk phos within normal limits. Creatinine within normal limits as well. APTT PT/INR within normal limits. Lactic acid elevated at 2.2 and trended upwards to 2.6. Bilirubins are within normal limits. CT abdomen and pelvis with intravenous contrast radiology report reads focal area of abrupt narrowing involving the splenic flexure/distal transverse colon with mucosal thickening and pericolonic stranding measuring approximately 8 cm in length resulting in large bowel obstruction. The transverse colon approximately measures 6.7 cm. This focal area could represent a stricture, an area of acute on chronic colitis/diverticulitis, however neoplasm cannot be entirely excluded. Further evaluation with colonoscopy with surgical evaluation is recommended. No evidence of free air. Focal nodular opacities are noted within the right middle lobe of uncertain etiology. On repeat evaluations, patient remains hemodynamically stable. She was able to have a bowel movement in the bedside commode. Results of laboratory and radiographic data along with differential diagnoses and treatment plan were discussed with the patient. She presents with acute abdominal pain, constipation. Initially she was febrile. Symptoms concerning for large bowel obstruction and possible colitis/diverticulitis. Rocephin and Flagyl were immediately initiated. No signs of severe sepsis or septic shock. Given findings on CT scan, case is discussed with general surgeon on-call Dr. Amy Mendoza at 23:15 who recommends no acute surgical intervention. Recommends keeping the patient n.p.o. He is agreeable with consultation in the morning.  Case is discussed with Dr. Wendy Ambrocio at 23:26 who also recommends no acute intervention at this time. He is agreeable with consultation as well, possible colonoscopy 8/12/2020. Recommends keeping the patient n.p.o. for possible colonoscopy. Patient was also initially hypotensive however was responsive to IV fluids. Fever had resolved as well. Given patient's symptoms and risk factors, we recommended admission to the hospital for further monitoring and treatment as necessary. Patient is agreeable. Case is discussed with admitting hospitalist who is agreeable with treatment plan excepts admission. Patient is currently in the emergency department awaiting admission and remains in hemodynamically stable condition. IV fluids and IV antibiotics are continued. All incidental findings on radiology studies were discussed with the patient. Clinical Impression:  1. Acute abdominal pain    2. Large bowel obstruction ([address]    3. Colitis    4. Fever, unspecified fever cause    5. Opacity of lung on imaging study    6. Lactic acidosis        ED Provider Disposition Time  DISPOSITION Admitted 08/12/2020 12:10:49 AM      Comment: Please note this report has been produced using speech recognition software and may contain errors related to that system including errors in grammar, punctuation, and spelling, as well as words and phrases that may be inappropriate. Efforts were made to edit the dictations.        [facility],   08/12/20 3498

## 2020-08-12 NOTE — CONSULTS
Department of General Surgery   Surgical Service Dr. Skylar Retana   Consult Note    Date of Consult: 8/12/20    Reason for Consult:  Concern for large bowel obstruction on CT    Requesting Physician:  ED    CHIEF COMPLAINT:  Abdominal pain    History Obtained From:  patient, electronic medical record    HISTORY OF PRESENT ILLNESS:      The patient is a 62 y.o. female who presents with abdominal pain described as:     Location: Abdomen, left > right  Quality: sharp  Severity: 10 /10 at worst  Duration: Ongoing over last several weeks, worse recently  Timing: Acute on sub-acute   Context: Recent admissions for chest pain, abdominal pain  Modifying factors: denies  Associated signs and symptoms: nausea    Pt reports that she has had small bowel movements on and off over last few weeks. She states she has had recent weight loss but can't give an exact amount. She reports having a colonoscopy done in [place], [place] in the past 10 years and states she had polyps. She is not known to a GI physician here. Past Medical History:    Past Medical History:   Diagnosis Date    Amphetamine abuse ([facility] [address] 2020    Cocaine abuse ([facility] [address] 2020    NSTEMI (non-ST elevated myocardial infarction) ([facility] [address] 04/2020       Past Surgical History:    History reviewed. No pertinent surgical history. Pt denies previous abdominal surgeries.      Current Medications:   Current Facility-Administered Medications   Medication Dose Route Frequency Provider Last Rate Last Dose    sodium chloride flush 0.9 % injection 10 mL  10 mL Intravenous 2 times per day Danae BOYD MD        sodium chloride flush 0.9 % injection 10 mL  10 mL Intravenous PRN Peter Jaci Garcia MD        acetaminophen (TYLENOL) tablet 650 mg  650 mg Oral Q6H PRN Peteraretha Garcia MD        Or    acetaminophen (TYLENOL) suppository 650 mg  650 mg Rectal Q6H PRN Peter Jaci Garcia MD        magnesium hydroxide (MILK OF MAGNESIA) 400 MG/5ML suspension 30 mL  30 mL Oral Daily PRN Peter Cocaine, Marijuana, Opiates , Methamphetamines     Comment: 2020 based on UDS    Sexual activity: None   Lifestyle    Physical activity     Days per week: None     Minutes per session: None    Stress: None   Relationships    Social connections     Talks on phone: None     Gets together: None     Attends Hindu service: None     Active member of club or organization: None     Attends meetings of clubs or organizations: None     Relationship status: None    Intimate partner violence     Fear of current or ex partner: None     Emotionally abused: None     Physically abused: None     Forced sexual activity: None   Other Topics Concern    None   Social History Narrative    None       Family History:   History reviewed. No pertinent family history. REVIEW OFSYSTEMS:    Review of Systems   Constitutional: Negative. HENT: Negative. Eyes: Negative. Respiratory: Negative. Cardiovascular: Negative. Gastrointestinal: Positive for abdominal pain, constipation and nausea. Endocrine: Negative. Genitourinary: Negative. Musculoskeletal: Negative. Skin: Negative. Allergic/Immunologic: Negative. Neurological: Negative. Hematological: Negative. Psychiatric/Behavioral: Negative. PHYSICAL EXAM:  Vitals:    08/12/20 0225 08/12/20 0302 08/12/20 0730 08/12/20 0855   BP:  (!) 170/98 (!) 156/98 (!) 176/98   Pulse: 90 61 63 59   Resp: 23  18 23   Temp:    97.5 °F (36.4 °C)   TempSrc:    Oral   SpO2: 100%  96% 99%   Weight:       Height:           Physical Exam  Vitals signs reviewed. Constitutional:       General: She is not in acute distress. HENT:      Head: Normocephalic and atraumatic. Nose: Nose normal.   Eyes:      General:         Right eye: No discharge. Left eye: No discharge. Neck:      Musculoskeletal: Normal range of motion. Cardiovascular:      Rate and Rhythm: Normal rate.    Pulmonary:      Effort: Pulmonary effort is normal.   Abdominal:      General: There is distension (mild). Palpations: Abdomen is soft. Tenderness: There is abdominal tenderness (generalized). There is no guarding or rebound. Musculoskeletal:         General: No swelling. Skin:     General: Skin is warm. Neurological:      General: No focal deficit present. Mental Status: She is alert. Psychiatric:         Mood and Affect: Mood normal.           DATA:    CBC with Differential:    Lab Results   Component Value Date    WBC 10.8 08/12/2020    RBC 4.60 08/12/2020    HGB 13.6 08/12/2020    HCT 42.6 08/12/2020     08/12/2020    MCV 92.6 08/12/2020    MCH 29.6 08/12/2020    MCHC 31.9 08/12/2020    RDW 12.6 08/12/2020    SEGSPCT 83.0 08/12/2020    BANDSPCT 28 05/06/2020    LYMPHOPCT 10.6 08/12/2020    MONOPCT 5.5 08/12/2020    BASOPCT 0.4 08/12/2020    MONOSABS 0.6 08/12/2020    LYMPHSABS 1.2 08/12/2020    EOSABS 0.0 08/12/2020    BASOSABS 0.0 08/12/2020    DIFFTYPE AUTOMATED DIFFERENTIAL 08/12/2020     CMP:    Lab Results   Component Value Date     08/12/2020    K 4.1 08/12/2020     08/12/2020    CO2 22 08/12/2020    BUN 10 08/12/2020    CREATININE 0.5 08/12/2020    GFRAA >60 08/12/2020    LABGLOM >60 08/12/2020    GLUCOSE 126 08/12/2020    PROT 6.2 08/12/2020    LABALBU 2.8 08/12/2020    CALCIUM 8.2 08/12/2020    BILITOT 0.2 08/12/2020    ALKPHOS 95 08/12/2020    AST 40 08/12/2020    ALT 30 08/12/2020     LA - 1.1 from 2.2     CT A/P 8/11/2020:  1. Focal area of abrupt narrowing involving the splenic flexure/distal    transverse colon with mucosal thickening and pericolonic stranding measuring    approximately 8 cm in length resulting in large bowel obstruction.  The    transverse colon proximally measures 6.7 cm.  This focal area could represent    a stricture, an area of acute on chronic colitis/diverticulitis, however    neoplasm cannot be entirely excluded.  Further evaluation with colonoscopy    with surgical evaluation is recommended.     2. No evidence

## 2020-08-12 NOTE — ED TRIAGE NOTES
Patient to the ED from home via EMS with complaints of constipation, abdominal pain, rectal bleeding, and hypotension. Patient reports last BM being 1 week ago, and states rectal bleeding \"for a month\". Patient takes plavix daily, hx of hemorrhoids. Patients reports pain 10/10 \"all over abdomen\", constant in nature.

## 2020-08-13 ENCOUNTER — ANESTHESIA EVENT (OUTPATIENT)
Dept: ENDOSCOPY | Age: 60
DRG: 329 | End: 2020-08-13
Payer: MEDICARE

## 2020-08-13 ENCOUNTER — ANESTHESIA (OUTPATIENT)
Dept: ENDOSCOPY | Age: 60
DRG: 329 | End: 2020-08-13
Payer: MEDICARE

## 2020-08-13 VITALS — SYSTOLIC BLOOD PRESSURE: 137 MMHG | OXYGEN SATURATION: 100 % | DIASTOLIC BLOOD PRESSURE: 60 MMHG

## 2020-08-13 PROCEDURE — 99233 SBSQ HOSP IP/OBS HIGH 50: CPT | Performed by: SURGERY

## 2020-08-13 PROCEDURE — 88341 IMHCHEM/IMCYTCHM EA ADD ANTB: CPT

## 2020-08-13 PROCEDURE — 2500000003 HC RX 250 WO HCPCS: Performed by: INTERNAL MEDICINE

## 2020-08-13 PROCEDURE — 0DBN8ZX EXCISION OF SIGMOID COLON, VIA NATURAL OR ARTIFICIAL OPENING ENDOSCOPIC, DIAGNOSTIC: ICD-10-PCS | Performed by: INTERNAL MEDICINE

## 2020-08-13 PROCEDURE — 6370000000 HC RX 637 (ALT 250 FOR IP): Performed by: SURGERY

## 2020-08-13 PROCEDURE — 3700000000 HC ANESTHESIA ATTENDED CARE: Performed by: INTERNAL MEDICINE

## 2020-08-13 PROCEDURE — 2580000003 HC RX 258: Performed by: NURSE ANESTHETIST, CERTIFIED REGISTERED

## 2020-08-13 PROCEDURE — 88342 IMHCHEM/IMCYTCHM 1ST ANTB: CPT

## 2020-08-13 PROCEDURE — 6360000002 HC RX W HCPCS: Performed by: INTERNAL MEDICINE

## 2020-08-13 PROCEDURE — 2500000003 HC RX 250 WO HCPCS: Performed by: EMERGENCY MEDICINE

## 2020-08-13 PROCEDURE — 99231 SBSQ HOSP IP/OBS SF/LOW 25: CPT | Performed by: NURSE PRACTITIONER

## 2020-08-13 PROCEDURE — 2580000003 HC RX 258: Performed by: EMERGENCY MEDICINE

## 2020-08-13 PROCEDURE — 2580000003 HC RX 258: Performed by: INTERNAL MEDICINE

## 2020-08-13 PROCEDURE — 88305 TISSUE EXAM BY PATHOLOGIST: CPT

## 2020-08-13 PROCEDURE — 94640 AIRWAY INHALATION TREATMENT: CPT

## 2020-08-13 PROCEDURE — C9113 INJ PANTOPRAZOLE SODIUM, VIA: HCPCS | Performed by: INTERNAL MEDICINE

## 2020-08-13 PROCEDURE — 6370000000 HC RX 637 (ALT 250 FOR IP): Performed by: INTERNAL MEDICINE

## 2020-08-13 PROCEDURE — 3609008300 HC SIGMOIDOSCOPY W/BIOPSY SINGLE/MULTIPLE: Performed by: INTERNAL MEDICINE

## 2020-08-13 PROCEDURE — 1200000000 HC SEMI PRIVATE

## 2020-08-13 PROCEDURE — 2500000003 HC RX 250 WO HCPCS: Performed by: NURSE ANESTHETIST, CERTIFIED REGISTERED

## 2020-08-13 PROCEDURE — 6360000002 HC RX W HCPCS: Performed by: EMERGENCY MEDICINE

## 2020-08-13 PROCEDURE — 6360000002 HC RX W HCPCS: Performed by: NURSE PRACTITIONER

## 2020-08-13 PROCEDURE — 2709999900 HC NON-CHARGEABLE SUPPLY: Performed by: INTERNAL MEDICINE

## 2020-08-13 PROCEDURE — 6360000002 HC RX W HCPCS: Performed by: NURSE ANESTHETIST, CERTIFIED REGISTERED

## 2020-08-13 PROCEDURE — 3700000001 HC ADD 15 MINUTES (ANESTHESIA): Performed by: INTERNAL MEDICINE

## 2020-08-13 RX ORDER — LIDOCAINE HYDROCHLORIDE 20 MG/ML
INJECTION, SOLUTION INFILTRATION; PERINEURAL PRN
Status: DISCONTINUED | OUTPATIENT
Start: 2020-08-13 | End: 2020-08-13 | Stop reason: SDUPTHER

## 2020-08-13 RX ORDER — MORPHINE SULFATE 4 MG/ML
4 INJECTION, SOLUTION INTRAMUSCULAR; INTRAVENOUS EVERY 4 HOURS PRN
Status: DISCONTINUED | OUTPATIENT
Start: 2020-08-13 | End: 2020-08-21

## 2020-08-13 RX ORDER — PROPOFOL 10 MG/ML
INJECTION, EMULSION INTRAVENOUS PRN
Status: DISCONTINUED | OUTPATIENT
Start: 2020-08-13 | End: 2020-08-13 | Stop reason: SDUPTHER

## 2020-08-13 RX ORDER — SODIUM CHLORIDE, SODIUM LACTATE, POTASSIUM CHLORIDE, CALCIUM CHLORIDE 600; 310; 30; 20 MG/100ML; MG/100ML; MG/100ML; MG/100ML
INJECTION, SOLUTION INTRAVENOUS CONTINUOUS PRN
Status: DISCONTINUED | OUTPATIENT
Start: 2020-08-13 | End: 2020-08-13 | Stop reason: SDUPTHER

## 2020-08-13 RX ORDER — ERYTHROMYCIN 250 MG/1
1000 TABLET, DELAYED RELEASE ORAL EVERY 8 HOURS
Status: DISCONTINUED | OUTPATIENT
Start: 2020-08-16 | End: 2020-08-15

## 2020-08-13 RX ORDER — LORAZEPAM 2 MG/ML
0.5 INJECTION INTRAMUSCULAR EVERY 6 HOURS PRN
Status: DISCONTINUED | OUTPATIENT
Start: 2020-08-13 | End: 2020-08-15

## 2020-08-13 RX ORDER — POLYETHYLENE GLYCOL 3350 17 G/17G
17 POWDER, FOR SOLUTION ORAL DAILY
Status: DISCONTINUED | OUTPATIENT
Start: 2020-08-13 | End: 2020-08-17

## 2020-08-13 RX ORDER — NEOMYCIN SULFATE 500 MG/1
1000 TABLET ORAL EVERY 8 HOURS SCHEDULED
Status: COMPLETED | OUTPATIENT
Start: 2020-08-16 | End: 2020-08-16

## 2020-08-13 RX ORDER — POLYETHYLENE GLYCOL 3350 17 G/17G
238 POWDER, FOR SOLUTION ORAL DAILY
Status: DISCONTINUED | OUTPATIENT
Start: 2020-08-16 | End: 2020-08-13

## 2020-08-13 RX ADMIN — ONDANSETRON 4 MG: 2 INJECTION INTRAMUSCULAR; INTRAVENOUS at 05:58

## 2020-08-13 RX ADMIN — POLYETHYLENE GLYCOL 3350 17 G: 17 POWDER, FOR SOLUTION ORAL at 10:49

## 2020-08-13 RX ADMIN — SODIUM CHLORIDE, POTASSIUM CHLORIDE, SODIUM LACTATE AND CALCIUM CHLORIDE: 600; 310; 30; 20 INJECTION, SOLUTION INTRAVENOUS at 13:26

## 2020-08-13 RX ADMIN — AMLODIPINE BESYLATE 5 MG: 5 TABLET ORAL at 08:27

## 2020-08-13 RX ADMIN — LIDOCAINE HYDROCHLORIDE 100 MG: 20 INJECTION, SOLUTION INFILTRATION; PERINEURAL at 07:21

## 2020-08-13 RX ADMIN — SODIUM CHLORIDE, PRESERVATIVE FREE 10 ML: 5 INJECTION INTRAVENOUS at 23:16

## 2020-08-13 RX ADMIN — MORPHINE SULFATE 4 MG: 4 INJECTION, SOLUTION INTRAMUSCULAR; INTRAVENOUS at 20:01

## 2020-08-13 RX ADMIN — PANTOPRAZOLE SODIUM 40 MG: 40 INJECTION, POWDER, FOR SOLUTION INTRAVENOUS at 08:27

## 2020-08-13 RX ADMIN — METRONIDAZOLE 500 MG: 500 INJECTION, SOLUTION INTRAVENOUS at 05:59

## 2020-08-13 RX ADMIN — MORPHINE SULFATE 2 MG: 4 INJECTION, SOLUTION INTRAMUSCULAR; INTRAVENOUS at 05:58

## 2020-08-13 RX ADMIN — PROPOFOL 260 MG: 10 INJECTION, EMULSION INTRAVENOUS at 07:21

## 2020-08-13 RX ADMIN — GABAPENTIN 800 MG: 400 CAPSULE ORAL at 08:27

## 2020-08-13 RX ADMIN — CEFTRIAXONE 1 G: 1 INJECTION, POWDER, FOR SOLUTION INTRAMUSCULAR; INTRAVENOUS at 05:59

## 2020-08-13 RX ADMIN — LORAZEPAM 0.5 MG: 2 INJECTION INTRAMUSCULAR; INTRAVENOUS at 17:10

## 2020-08-13 RX ADMIN — ONDANSETRON 4 MG: 2 INJECTION INTRAMUSCULAR; INTRAVENOUS at 13:25

## 2020-08-13 RX ADMIN — ESCITALOPRAM OXALATE 20 MG: 10 TABLET ORAL at 08:27

## 2020-08-13 RX ADMIN — ONDANSETRON 4 MG: 2 INJECTION INTRAMUSCULAR; INTRAVENOUS at 22:06

## 2020-08-13 RX ADMIN — GABAPENTIN 800 MG: 400 CAPSULE ORAL at 20:00

## 2020-08-13 RX ADMIN — METRONIDAZOLE 500 MG: 500 INJECTION, SOLUTION INTRAVENOUS at 13:26

## 2020-08-13 RX ADMIN — GABAPENTIN 800 MG: 400 CAPSULE ORAL at 13:26

## 2020-08-13 RX ADMIN — MORPHINE SULFATE 4 MG: 4 INJECTION, SOLUTION INTRAMUSCULAR; INTRAVENOUS at 10:49

## 2020-08-13 RX ADMIN — SODIUM CHLORIDE, PRESERVATIVE FREE 10 ML: 5 INJECTION INTRAVENOUS at 20:01

## 2020-08-13 RX ADMIN — MORPHINE SULFATE 4 MG: 4 INJECTION, SOLUTION INTRAMUSCULAR; INTRAVENOUS at 15:09

## 2020-08-13 RX ADMIN — LORAZEPAM 0.5 MG: 2 INJECTION INTRAMUSCULAR; INTRAVENOUS at 23:14

## 2020-08-13 RX ADMIN — METRONIDAZOLE 500 MG: 500 INJECTION, SOLUTION INTRAVENOUS at 22:08

## 2020-08-13 RX ADMIN — LORAZEPAM 0.5 MG: 2 INJECTION INTRAMUSCULAR; INTRAVENOUS at 10:49

## 2020-08-13 RX ADMIN — CARVEDILOL 3.12 MG: 3.12 TABLET, FILM COATED ORAL at 08:28

## 2020-08-13 RX ADMIN — CARVEDILOL 3.12 MG: 3.12 TABLET, FILM COATED ORAL at 16:14

## 2020-08-13 RX ADMIN — SODIUM CHLORIDE, POTASSIUM CHLORIDE, SODIUM LACTATE AND CALCIUM CHLORIDE: 600; 310; 30; 20 INJECTION, SOLUTION INTRAVENOUS at 07:17

## 2020-08-13 RX ADMIN — METOCLOPRAMIDE 10 MG: 5 INJECTION, SOLUTION INTRAMUSCULAR; INTRAVENOUS at 05:58

## 2020-08-13 RX ADMIN — BUDESONIDE AND FORMOTEROL FUMARATE DIHYDRATE 2 PUFF: 160; 4.5 AEROSOL RESPIRATORY (INHALATION) at 20:10

## 2020-08-13 RX ADMIN — METOCLOPRAMIDE 10 MG: 5 INJECTION, SOLUTION INTRAMUSCULAR; INTRAVENOUS at 22:06

## 2020-08-13 RX ADMIN — METOCLOPRAMIDE 10 MG: 5 INJECTION, SOLUTION INTRAMUSCULAR; INTRAVENOUS at 13:26

## 2020-08-13 ASSESSMENT — PAIN DESCRIPTION - LOCATION
LOCATION: ABDOMEN;HEAD
LOCATION: ABDOMEN

## 2020-08-13 ASSESSMENT — PAIN DESCRIPTION - PROGRESSION
CLINICAL_PROGRESSION: NOT CHANGED

## 2020-08-13 ASSESSMENT — PAIN SCALES - GENERAL
PAINLEVEL_OUTOF10: 10
PAINLEVEL_OUTOF10: 8
PAINLEVEL_OUTOF10: 3
PAINLEVEL_OUTOF10: 10
PAINLEVEL_OUTOF10: 3
PAINLEVEL_OUTOF10: 2
PAINLEVEL_OUTOF10: 10

## 2020-08-13 ASSESSMENT — PAIN DESCRIPTION - FREQUENCY
FREQUENCY: CONTINUOUS
FREQUENCY: INTERMITTENT

## 2020-08-13 ASSESSMENT — ENCOUNTER SYMPTOMS
VOMITING: 1
SINUS PAIN: 0
COLOR CHANGE: 0
ABDOMINAL PAIN: 1
ALLERGIC/IMMUNOLOGIC NEGATIVE: 1
CONSTIPATION: 1
DIARRHEA: 0
BLOOD IN STOOL: 0
COUGH: 0
PHOTOPHOBIA: 0
RESPIRATORY NEGATIVE: 1
NAUSEA: 1
SHORTNESS OF BREATH: 0
EYES NEGATIVE: 1

## 2020-08-13 ASSESSMENT — PAIN DESCRIPTION - DESCRIPTORS
DESCRIPTORS: ACHING;THROBBING
DESCRIPTORS: ACHING

## 2020-08-13 ASSESSMENT — PAIN DESCRIPTION - ORIENTATION: ORIENTATION: MID

## 2020-08-13 ASSESSMENT — PAIN DESCRIPTION - PAIN TYPE
TYPE: ACUTE PAIN
TYPE: ACUTE PAIN

## 2020-08-13 ASSESSMENT — LIFESTYLE VARIABLES: SMOKING_STATUS: 1

## 2020-08-13 ASSESSMENT — PAIN DESCRIPTION - ONSET: ONSET: AWAKENED FROM SLEEP

## 2020-08-13 NOTE — PROGRESS NOTES
Hospitalist Progress Note      Name:  Enoc Fontana /Age/Sex: 1960  (62 y.o. female)   MRN & CSN:  [id number] & [id number] Admission Date/Time: 2020  8:42 PM   Location:  1284/0074-L PCP: Dahlia Villanueva 59 Day: 3    History of Present Illness:     Chief Complaint: Enoc Fontana is a 62 y.o.  female  who presents with abdominal pain     The patient seen and examined at bedside. She is curled to the side seems to be in mild distress. She has been complaining of abdominal pain, says would want her pain medication dose to be increased. Ten point ROS reviewed negative, unless as noted above    Objective:        Intake/Output Summary (Last 24 hours) at 2020 1122  Last data filed at 2020 0746  Gross per 24 hour   Intake 550 ml   Output --   Net 550 ml      Vitals:   Vitals:    20 0823   BP: (!) 192/87   Pulse: 58   Resp: 23   Temp: 98.6 °F (37 °C)   SpO2: 94%     Physical Exam:   General Appearance: alert and oriented to person, place and time, in no acute distress  Cardiovascular: normal rate, regular rhythm, normal S1 and S2  Pulmonary/Chest: clear to auscultation bilaterally- no wheezes, rales or rhonchi, normal air movement, no respiratory distress  Abdomen: soft, + generalized abdominal tenderness, non-distended, normal bowel sounds, no masses   Extremities: no cyanosis, clubbing or edema, pulse   Skin: warm and dry, no rash or erythema  Head: normocephalic and atraumatic  Eyes: pupils equal, round, and reactive to light  Neck: supple and non-tender without mass, no thyromegaly   Musculoskeletal: normal range of motion, no joint swelling, deformity or tenderness  Neurological: alert, oriented, normal speech, no focal findings or movement disorder noted    Medications:   Medications:    [START ON 2020] neomycin  1,000 mg Oral 3 times per day    [START ON 2020] erythromycin  1,000 mg Oral Q8H    polyethylene glycol  17 g Oral Daily    sodium chloride flush  10 mL Intravenous 2 times per day    carvedilol  3.125 mg Oral BID WC    amLODIPine  5 mg Oral Daily    escitalopram  20 mg Oral Daily    levothyroxine  100 mcg Oral Daily    budesonide-formoterol  2 puff Inhalation BID    gabapentin  800 mg Oral TID    cefTRIAXone (ROCEPHIN) IV  1 g Intravenous Q24H    metroNIDAZOLE  500 mg Intravenous Q8H    pantoprazole  40 mg Intravenous Daily    metoclopramide  10 mg Intravenous Q8H    ondansetron  4 mg Intravenous Q8H    sodium chloride flush  10 mL Intravenous BID      Infusions:    lactated ringers 100 mL/hr at 08/12/20 1707     PRN Meds: morphine, 4 mg, Q4H PRN  LORazepam, 0.5 mg, Q6H PRN  sodium chloride flush, 10 mL, PRN  acetaminophen, 650 mg, Q6H PRN    Or  acetaminophen, 650 mg, Q6H PRN  magnesium hydroxide, 30 mL, Daily PRN  promethazine, 12.5 mg, Q6H PRN            Pertinent New Labs & Imaging Studies     CBC with Differential:    Lab Results   Component Value Date    WBC 10.8 08/12/2020    RBC 4.60 08/12/2020    HGB 13.6 08/12/2020    HCT 42.6 08/12/2020     08/12/2020    MCV 92.6 08/12/2020    MCH 29.6 08/12/2020    MCHC 31.9 08/12/2020    RDW 12.6 08/12/2020    SEGSPCT 83.0 08/12/2020    BANDSPCT 28 05/06/2020    LYMPHOPCT 10.6 08/12/2020    MONOPCT 5.5 08/12/2020    BASOPCT 0.4 08/12/2020    MONOSABS 0.6 08/12/2020    LYMPHSABS 1.2 08/12/2020    EOSABS 0.0 08/12/2020    BASOSABS 0.0 08/12/2020    DIFFTYPE AUTOMATED DIFFERENTIAL 08/12/2020     CMP:    Lab Results   Component Value Date     08/12/2020    K 4.1 08/12/2020     08/12/2020    CO2 22 08/12/2020    BUN 10 08/12/2020    CREATININE 0.5 08/12/2020    GFRAA >60 08/12/2020    LABGLOM >60 08/12/2020    GLUCOSE 126 08/12/2020    PROT 6.2 08/12/2020    LABALBU 2.8 08/12/2020    CALCIUM 8.2 08/12/2020    BILITOT 0.2 08/12/2020    ALKPHOS 95 08/12/2020    AST 40 08/12/2020    ALT 30 08/12/2020     Ct Abdomen Pelvis W Iv Contrast Additional Contrast? None    Result represent a stricture, an area of acute on chronic colitis/diverticulitis, however neoplasm cannot be entirely excluded. Further evaluation with colonoscopy with surgical evaluation is recommended. 2. No evidence of free air. 3. Focal nodular opacities are noted within the right middle lobe of uncertain etiology. This should be further evaluated with CT chest.       Assessment and Plan:   Bonnie Ho is a 62 y.o.  female  who presents with abdominal pain    Abdominal pain  Likely large bowel obstruction  CT scan with abrupt narrowing involving the splenic flexure/distal transverse colon with mucosal thickening and pericolonic stranding, caused bowel obstruction  NPO  Continue empiric Rocephin  Continue IV fluids  General surgery following, appreciated recommendation  GI consult appreciated, s/p colonoscopy.  Not able to pass scope beyond stricture, recommended surgical resection  Noted that Cardiology consulted for pre op evaluation\  Adjusting pain medications    H/o amphetamine use    CAD s/p PCI  Holding ASA, plavix  Continue BB    Hypothyroidism  Continue Synthroid    Hepatitis C  HLD  COPD     HTN  Nicotine abuse    Diet DIET CLEAR LIQUID;  Diet NPO Time Specified   DVT Prophylaxis [] Lovenox, []  Heparin, [x] SCDs, [] Ambulation   GI Prophylaxis [x] PPI,  [] H2 Blocker,  [] Carafate,  [] Diet/Tube Feeds   Code Status Full Code   Disposition Patient requires continued admission due to abdominal pain   MDM [] Low, [x] Moderate,[]  High     Electronically signed by Royal Hilaria MD on 8/13/2020 at 11:22 AM

## 2020-08-13 NOTE — OP NOTE
Operative Note      Patient: Bonnie Ho  YOB: 1960  MRN: [id number]    Date of Procedure: 8/13/2020    [facility]      BRIEF OP REPORT:    Impression:    1) Ulcerated stricture Proximal Sigmoid/Distildes colon, biopsy done ? Diverticular, ?  Ischemiac (biopsy and tatoo done), unable to examine colon beyond the stricture, scope cannot pass)   2) Severe sigmoid diverticulosis   3) Grade II hemorrhoids        Suggest:   1) recommend surgical resection   2) c scope in 6 months   3) clear liquid diet     Full EGD/COLONOSCOPY report available by going to \"chart review\" then \"procedures\" then  \"EGD/Colonoscopy\"  then \"View Endoscopy Report\"   Electronically signed by Brit Yu MD on 8/13/2020 at 7:36 AM

## 2020-08-13 NOTE — PROGRESS NOTES
General Surgery-Dr. Sanchez Hernandez Day: 3    Chief Complaint on Admission: large bowel obstruction       Subjective:     Pt went for colonoscopy with Dr. Nathaniel Nevarez earlier. Results d/w him over phone and reviewed his op note. Pt is feeling better. She is tolerating clears. Abdominal pain improved a bit. No complaints currently during my interview with her. Did have some BMs with enemas. ROS:  Review of Systems   Constitutional: Positive for fatigue. HENT: Negative. Eyes: Negative. Respiratory: Negative. Cardiovascular: Negative. Gastrointestinal: Positive for abdominal pain. Endocrine: Negative. Genitourinary: Negative. Musculoskeletal: Negative. Skin: Negative. Allergic/Immunologic: Negative. Neurological: Negative. Hematological: Negative. Psychiatric/Behavioral: Negative. Allergies  Patient has no known allergies. Diagnosis Date    Amphetamine abuse ([facility] [address]     Cocaine abuse ([facility] [address]     NSTEMI (non-ST elevated myocardial infarction) ([facility] [address] 2020       Objective:     Vitals:    20 0823   BP: (!) 192/87   Pulse: 58   Resp: 23   Temp: 98.6 °F (37 °C)   SpO2: 94%       TEMPERATURE:  Current -Temp: 98.6 °F (37 °C); Max - Temp  Av.2 °F (36.8 °C)  Min: 98 °F (36.7 °C)  Max: 98.6 °F (37 °C)    I/O this shift: In: 400 [I.V.:400]  Out: - I/O last 3 completed shifts: In: 150 [IV Piggyback:150]  Out: -       Physical Exam:  Physical Exam  Vitals signs reviewed. Constitutional:       General: She is not in acute distress. Appearance: Normal appearance. She is not ill-appearing, toxic-appearing or diaphoretic. HENT:      Head: Normocephalic and atraumatic. Right Ear: External ear normal.      Left Ear: External ear normal.   Eyes:      General:         Left eye: No discharge. Extraocular Movements: Extraocular movements intact. Cardiovascular:      Rate and Rhythm: Normal rate.    Pulmonary:      Effort: No respiratory distress. Abdominal:      General: There is distension (mild). Palpations: Abdomen is soft. Tenderness: There is abdominal tenderness (min and less than yesterday). Musculoskeletal:         General: No swelling. Skin:     General: Skin is warm. Neurological:      General: No focal deficit present. Mental Status: She is alert.    Psychiatric:         Mood and Affect: Mood normal.           Scheduled Meds:   [START ON 8/16/2020] neomycin  1,000 mg Oral 3 times per day    [START ON 8/16/2020] erythromycin  1,000 mg Oral Q8H    polyethylene glycol  17 g Oral Daily    sodium chloride flush  10 mL Intravenous 2 times per day    carvedilol  3.125 mg Oral BID WC    amLODIPine  5 mg Oral Daily    escitalopram  20 mg Oral Daily    levothyroxine  100 mcg Oral Daily    budesonide-formoterol  2 puff Inhalation BID    gabapentin  800 mg Oral TID    cefTRIAXone (ROCEPHIN) IV  1 g Intravenous Q24H    metroNIDAZOLE  500 mg Intravenous Q8H    pantoprazole  40 mg Intravenous Daily    metoclopramide  10 mg Intravenous Q8H    ondansetron  4 mg Intravenous Q8H    sodium chloride flush  10 mL Intravenous BID     ContinuousInfusions:   lactated ringers 100 mL/hr at 08/13/20 1326     PRN Meds:morphine, LORazepam, sodium chloride flush, acetaminophen **OR** acetaminophen, magnesium hydroxide, promethazine **OR** [DISCONTINUED] ondansetron      Labs/Imaging Results:   Lab Results   Component Value Date    WBC 10.8 (H) 08/12/2020    HGB 13.6 08/12/2020    HCT 42.6 08/12/2020    MCV 92.6 08/12/2020     08/12/2020     Lab Results   Component Value Date     08/12/2020    K 4.1 08/12/2020     08/12/2020    CO2 22 08/12/2020    BUN 10 08/12/2020    CREATININE 0.5 (L) 08/12/2020    GLUCOSE 126 (H) 08/12/2020    CALCIUM 8.2 (L) 08/12/2020    PROT 6.2 (L) 08/12/2020    LABALBU 2.8 (L) 08/12/2020    BILITOT 0.2 08/12/2020    ALKPHOS 95 08/12/2020    AST 40 (H) 08/12/2020    ALT 30 08/12/2020 LABGLOM >60 08/12/2020    GFRAA >60 08/12/2020       Assessment:     61 y/o F with stricture of proximal sigmoid colon    Plan:     -Continue clears  -NPO at midnight on Sunday for planned surgery on Monday. Orders placed for NPO and for bowel prep and Abx.  -D/w Cardiology Nicci GRISSOM, that pt will need DPAT held and Integrilin bridge. Planned procedure on Monday morning.   -Continue medical mgt. Appreciated. -Plan d/w pt and pt's nurse at bedside.        Electronically signed by Napoleon Vo II, MD on 8/13/2020 at 2:40 PM

## 2020-08-13 NOTE — ANESTHESIA PRE PROCEDURE
Department of Anesthesiology  Preprocedure Note       Name:  Evi Franz   Age:  62 y.o.  :  1960                                          MRN:  [id number]         Date:  2020      Surgeon: Santos Hobbs):  Nyasia Acevedo MD    Procedure: Procedure(s):  COLONOSCOPY DIAGNOSTIC    Medications prior to admission:   Prior to Admission medications    Medication Sig Start Date End Date Taking? Authorizing Provider   cephALEXin (KEFLEX) 500 MG capsule Take 1 capsule by mouth 4 times daily for 10 days 20  LIZETTE Carpio   sulfamethoxazole-trimethoprim (BACTRIM DS) 800-160 MG per tablet Take 1 tablet by mouth 2 times daily for 10 days 20  LIZETTE Carpio   amLODIPine (NORVASC) 5 MG tablet Take 1 tablet by mouth daily 20   Elizabeth Chester MD   traZODone (DESYREL) 50 MG tablet Take 50 mg by mouth nightly    Historical Provider, MD   mometasone-formoterol (DULERA) 100-5 MCG/ACT inhaler Inhale 2 puffs into the lungs 2 times daily    Historical Provider, MD   fluticasone (FLONASE) 50 MCG/ACT nasal spray 2 sprays by Each Nostril route daily    Historical Provider, MD   levothyroxine (SYNTHROID) 100 MCG tablet Take 100 mcg by mouth Daily    Historical Provider, MD   gabapentin (NEURONTIN) 800 MG tablet Take 800 mg by mouth 3 times daily.     Historical Provider, MD   promethazine (PHENERGAN) 25 MG tablet Take 25 mg by mouth every 6 hours as needed for Nausea    Historical Provider, MD   albuterol sulfate HFA (VENTOLIN HFA) 108 (90 Base) MCG/ACT inhaler Inhale 1 puff into the lungs every 6 hours as needed for Wheezing    Historical Provider, MD   escitalopram (LEXAPRO) 20 MG tablet Take 20 mg by mouth daily    Historical Provider, MD   docusate sodium (COLACE) 100 MG capsule Take 100 mg by mouth 2 times daily    Historical Provider, MD   atorvastatin (LIPITOR) 80 MG tablet Take 80 mg by mouth nightly    Historical Provider, MD   aspirin 81 MG chewable tablet Take 81 mg by mouth daily Historical Provider, MD   carvedilol (COREG) 3.125 MG tablet Take 3.125 mg by mouth 2 times daily (with meals)    Historical Provider, MD   traMADol (ULTRAM) 50 MG tablet Take 50 mg by mouth every 6 hours as needed for Pain.     Historical Provider, MD   clopidogrel (PLAVIX) 75 MG tablet Take 75 mg by mouth daily    Historical Provider, MD   fluticasone-salmeterol (ADVAIR) 250-50 MCG/DOSE AEPB Inhale 1 puff into the lungs 2 times daily    Historical Provider, MD       Current medications:    Current Facility-Administered Medications   Medication Dose Route Frequency Provider Last Rate Last Dose    sodium chloride flush 0.9 % injection 10 mL  10 mL Intravenous 2 times per day Gregorio Russell MD   10 mL at 08/12/20 2102    sodium chloride flush 0.9 % injection 10 mL  10 mL Intravenous PRN Peter Sangeetha Sweeney MD        acetaminophen (TYLENOL) tablet 650 mg  650 mg Oral Q6H PRN Peter Sangeetha Sweeney MD        Or    acetaminophen (TYLENOL) suppository 650 mg  650 mg Rectal Q6H PRN Peter Sangeetha Sweeney MD        magnesium hydroxide (MILK OF MAGNESIA) 400 MG/5ML suspension 30 mL  30 mL Oral Daily PRN Peter Sangeetha Sweeney MD        promethazine (PHENERGAN) tablet 12.5 mg  12.5 mg Oral Q6H PRN Peter Sangeetha Sweeney MD   12.5 mg at 08/12/20 0142    lactated ringers infusion   Intravenous Continuous Peter Sangeetha Sweeney  mL/hr at 08/12/20 1707      morphine sulfate (PF) injection 2 mg  2 mg Intravenous Q4H PRN Peter Sangeetha Sweeney MD   2 mg at 08/13/20 0558    carvedilol (COREG) tablet 3.125 mg  3.125 mg Oral BID WC Peter Sangeetha Sweeney MD   3.125 mg at 08/12/20 1650    amLODIPine (NORVASC) tablet 5 mg  5 mg Oral Daily Emerald BOYD MD   5 mg at 08/12/20 0938    escitalopram (LEXAPRO) tablet 20 mg  20 mg Oral Daily Peter Sangeetha Sweeney MD   20 mg at 08/12/20 0937    levothyroxine (SYNTHROID) tablet 100 mcg  100 mcg Oral Daily Peter Sangeetha Sweeney MD   100 mcg at 08/12/20 0938    budesonide-formoterol (SYMBICORT) 160-4.5 MCG/ACT inhaler 2 puff  2 puff Inhalation BID Carlos Richardson MD   2 puff at 08/12/20 2100    gabapentin (NEURONTIN) capsule 800 mg  800 mg Oral TID Carlos Richardson MD   800 mg at 08/12/20 2101    cefTRIAXone (ROCEPHIN) 1 g IVPB in 50 mL D5W minibag  1 g Intravenous Q24H Ramu Richards DO   Stopped at 08/13/20 8332    metronidazole (FLAGYL) 500 mg in NaCl 100 mL IVPB premix  500 mg Intravenous Q8H Ramu Richards  mL/hr at 08/13/20 0559 500 mg at 08/13/20 0559    pantoprazole (PROTONIX) injection 40 mg  40 mg Intravenous Daily Anthony Yu MD   40 mg at 08/12/20 1715    metoclopramide (REGLAN) injection 10 mg  10 mg Intravenous Q8H Sergo Estrella APRN - CNP   10 mg at 08/13/20 0558    ondansetron (ZOFRAN) injection 4 mg  4 mg Intravenous Q8H Sergo Estrella APRN - CNP   4 mg at 08/13/20 0558    sodium chloride flush 0.9 % injection 10 mL  10 mL Intravenous BID Ramu Nickerson DO           Allergies:  No Known Allergies    Problem List:    Patient Active Problem List   Diagnosis Code    Colitis K52.9    Acute abdominal pain R10.9       Past Medical History:        Diagnosis Date    Amphetamine abuse ([facility] [address] 2020    Cocaine abuse ([facility] [address] 2020    NSTEMI (non-ST elevated myocardial infarction) ([facility] [address] 04/2020       Past Surgical History:  History reviewed. No pertinent surgical history.     Social History:    Social History     Tobacco Use    Smoking status: Current Every Day Smoker    Smokeless tobacco: Never Used   Substance Use Topics    Alcohol use: Never     Frequency: Never                                Ready to quit: Not Answered  Counseling given: Not Answered      Vital Signs (Current):   Vitals:    08/12/20 0855 08/12/20 1650 08/12/20 2053 08/13/20 0555   BP: (!) 176/98 (!) 157/80 (!) 153/73 (!) 161/87   Pulse: 59 77 63 68   Resp: 23  17 21   Temp: 36.4 °C (97.5 °F)  36.7 °C (98 °F) 36.7 °C (98.1 °F)   TempSrc: Oral  Oral Oral   SpO2: 99%  92% 97%   Weight:    150 lb 9 oz (68.3 kg)   Height: surgery. Cardiovascular:  Exercise tolerance: good (>4 METS),   (+) past MI:,          Beta Blocker:  Dose within 24 Hrs      ROS comment: Stress test 8/7/2020:  Summary    Normal Stress nuclear scintigraphic study suggestive of normal myocardial    perfusion. Gated images demonstrate normal left ventricular systolic    function with EF of 60 %. Neuro/Psych:   Negative Neuro/Psych ROS              GI/Hepatic/Renal:   (+) bowel prep,           Endo/Other: Negative Endo/Other ROS                     ROS comment: Cocaine abuse  Abdominal:           Vascular: negative vascular ROS. Anesthesia Plan      MAC     ASA 3       Induction: intravenous. Anesthetic plan and risks discussed with patient. JORDIN Bryson CRNA   8/13/2020      Pre Anesthesia Evaluation complete. Anesthesia plan, risks, benefits, alternatives, and personnel discussed with patient and/or legal guardian. Patient and/or legal guardian verbalized an understanding and agreed to proceed. Anesthesia plan discussed with care team members and agreed upon.   JORDIN Bryson CRNA  8/13/2020

## 2020-08-13 NOTE — CARE COORDINATION
Reviewed chart for discharge planning. Pt admitted from home, positive UDS. Attempted to meet with pt to initiate discharge planning- pt was receiving personal care in bathroom/ CM to return. Per chart review pt going to OR on Monday morning therefore not medically stable for discharge at this time.

## 2020-08-13 NOTE — PROGRESS NOTES
Cardiology follow up Note      Reason for consultation: DAPT management    Chief complaint : Abdominal pain    Referring physician: Dr. Brenda Lee    Primary care physician: Geovany Ocasio DO      History of Present Illness:   Mrs. April Cheema is lethargic upon examination. She is post Colonoscopy. Pastmedical history:   Past Medical History:   Diagnosis Date    Amphetamine abuse ([facility] 2020    Cocaine abuse ([facility] 2020    NSTEMI (non-ST elevated myocardial infarction) ([facility] 04/2020       Surgical history :   Past Surgical History:   Procedure Laterality Date    SIGMOIDOSCOPY N/A 8/13/2020    SIGMOIDOSCOPY BIOPSY FLEXIBLE performed by Yin Hoyos MD at [address] [facility] ENDOSCOPY       Family history: History reviewed. No pertinent family history. Social history :  reports that she has been smoking. She has never used smokeless tobacco. She reports current drug use. Drugs: Cocaine, Marijuana, Opiates , and Methamphetamines. She reports that she does not drink alcohol. No Known Allergies    No current facility-administered medications on file prior to encounter. Current Outpatient Medications on File Prior to Encounter   Medication Sig Dispense Refill    cephALEXin (KEFLEX) 500 MG capsule Take 1 capsule by mouth 4 times daily for 10 days 40 capsule 0    sulfamethoxazole-trimethoprim (BACTRIM DS) 800-160 MG per tablet Take 1 tablet by mouth 2 times daily for 10 days 20 tablet 0    amLODIPine (NORVASC) 5 MG tablet Take 1 tablet by mouth daily 30 tablet 3    traZODone (DESYREL) 50 MG tablet Take 50 mg by mouth nightly      mometasone-formoterol (DULERA) 100-5 MCG/ACT inhaler Inhale 2 puffs into the lungs 2 times daily      fluticasone (FLONASE) 50 MCG/ACT nasal spray 2 sprays by Each Nostril route daily      levothyroxine (SYNTHROID) 100 MCG tablet Take 100 mcg by mouth Daily      gabapentin (NEURONTIN) 800 MG tablet Take 800 mg by mouth 3 times daily.       promethazine (PHENERGAN) 25 MG tablet Take 25 mg by mouth every 6 hours as needed for Nausea      albuterol sulfate HFA (VENTOLIN HFA) 108 (90 Base) MCG/ACT inhaler Inhale 1 puff into the lungs every 6 hours as needed for Wheezing      escitalopram (LEXAPRO) 20 MG tablet Take 20 mg by mouth daily      docusate sodium (COLACE) 100 MG capsule Take 100 mg by mouth 2 times daily      atorvastatin (LIPITOR) 80 MG tablet Take 80 mg by mouth nightly      aspirin 81 MG chewable tablet Take 81 mg by mouth daily      carvedilol (COREG) 3.125 MG tablet Take 3.125 mg by mouth 2 times daily (with meals)      traMADol (ULTRAM) 50 MG tablet Take 50 mg by mouth every 6 hours as needed for Pain.  clopidogrel (PLAVIX) 75 MG tablet Take 75 mg by mouth daily      fluticasone-salmeterol (ADVAIR) 250-50 MCG/DOSE AEPB Inhale 1 puff into the lungs 2 times daily         Review of Systems:   Review of Systems   Constitutional: Negative for fatigue and fever. HENT: Negative for ear pain and sinus pain. Eyes: Negative for photophobia and visual disturbance. Respiratory: Negative for cough and shortness of breath. Cardiovascular: Negative for chest pain, palpitations and leg swelling. Gastrointestinal: Positive for abdominal pain, constipation, nausea and vomiting. Negative for blood in stool and diarrhea. Endocrine: Negative for polyphagia and polyuria. Genitourinary: Negative for decreased urine volume and difficulty urinating. Musculoskeletal: Negative for arthralgias and gait problem. Skin: Negative for color change and wound. Neurological: Negative for dizziness, syncope, weakness, light-headedness and headaches. Psychiatric/Behavioral: Negative for agitation. The patient is not hyperactive.             Examination:      Vitals:    08/12/20 2053 08/13/20 0555 08/13/20 0823 08/13/20 1612   BP: (!) 153/73 (!) 161/87 (!) 192/87 (!) 152/82   Pulse: 63 68 58 64   Resp: 17 21 23 19   Temp: 98 °F (36.7 °C) 98.1 °F (36.7 °C) 98.6 °F (37 °C) 98.4 °F (36.9 °C)   TempSrc: Oral Oral Oral Oral   SpO2: 92% 97% 94% 98%   Weight:  150 lb 9 oz (68.3 kg)     Height:            Body mass index is 27.54 kg/m². Physical Exam  Vitals signs reviewed. Constitutional:       General: She is not in acute distress. Appearance: Normal appearance. She is not ill-appearing. Interventions: She is sedated. HENT:      Head: Normocephalic and atraumatic. Eyes:      Pupils: Pupils are equal, round, and reactive to light. Neck:      Musculoskeletal: Neck supple. No muscular tenderness. Vascular: No carotid bruit. Cardiovascular:      Rate and Rhythm: Normal rate and regular rhythm. Pulses: Normal pulses. Heart sounds: Normal heart sounds. No murmur. Pulmonary:      Effort: Pulmonary effort is normal. No respiratory distress. Breath sounds: Normal breath sounds. Musculoskeletal:         General: No swelling or deformity. Skin:     General: Skin is warm and dry. Capillary Refill: Capillary refill takes less than 2 seconds. Neurological:      Mental Status: She is oriented to person, place, and time and easily aroused. Psychiatric:         Mood and Affect: Mood normal.         Behavior: Behavior normal.         Thought Content:  Thought content normal.         Judgment: Judgment normal.         CBC:   Lab Results   Component Value Date    WBC 10.8 08/12/2020    HGB 13.6 08/12/2020    HCT 42.6 08/12/2020     08/12/2020     Lipids:No results found for: CHOL, TRIG, HDL, LDLCALC, LDLDIRECT  PT/INR:   Lab Results   Component Value Date    INR 0.90 08/11/2020        BMP:    Lab Results   Component Value Date     08/12/2020    K 4.1 08/12/2020     08/12/2020    CO2 22 08/12/2020    BUN 10 08/12/2020     CMP:   Lab Results   Component Value Date    AST 40 (H) 08/12/2020    PROT 6.2 (L) 08/12/2020    BILITOT 0.2 08/12/2020    ALKPHOS 95 08/12/2020     TSH:  No results found for: TSH    EKG INTERPRETATION-sinus rhythm      IMPRESSION / RECOMMENDATIONS:   Coronary artery disease status post PCI 5/11/2020  Hypertension  Illicit drug abuse  Hyperlipidemia  Tobacco abuse  Constipation      Due to patient having recent MI with PCI of on 511 of 2020 patient is considered a high risk for surgery. integerlin bridging protocol Reccomendation  Stop plavix tomorrow 8/14/2020 start integerlin Saturday 8/15/2020. Continue integerlin until 0800 Monday 8/17/2020 for 1000 surgery. Patient blood pressure has been elevated today. Patient states she has been in a lot of abdominal pain. Will monitor    Encourage patient to stop cocaine abuse. Will check urine tox    We will check lipid panel. Encourage patient to stop smoking. Patient is not ready to stop smoking. Thanks again for allowing me to participate in care of this patient. Please call me if you have any questions. With best regards.       Fabian Ambrose, JORDIN - CNP, 8/13/2020 5:20 PM

## 2020-08-13 NOTE — PLAN OF CARE
will improve  Outcome: Ongoing  Goal: Complications related to the disease process, condition or treatment will be avoided or minimized  Description: Complications related to the disease process, condition or treatment will be avoided or minimized  Outcome: Ongoing  Goal: Ability to maintain clinical measurements within normal limits will improve  Description: Ability to maintain clinical measurements within normal limits will improve  Outcome: Ongoing     Problem: Respiratory:  Goal: Ability to maintain normal respiratory secretions will improve  Description: Ability to maintain normal respiratory secretions will improve  Outcome: Ongoing     Problem: Skin Integrity:  Goal: Demonstration of wound healing without infection will improve  Description: Demonstration of wound healing without infection will improve  Outcome: Ongoing  Goal: Complications related to intravenous access or infusion will be avoided or minimized  Description: Complications related to intravenous access or infusion will be avoided or minimized  Outcome: Ongoing     Problem: Activity:  Goal: Risk for activity intolerance will decrease  Description: Risk for activity intolerance will decrease  Outcome: Ongoing     Problem:  Bowel/Gastric:  Goal: Diagnostic test results will improve  Description: Diagnostic test results will improve  Outcome: Ongoing  Goal: Bowel function will improve  Description: Bowel function will improve  Outcome: Ongoing  Goal: Occurrences of nausea will decrease  Description: Occurrences of nausea will decrease  Outcome: Ongoing  Goal: Occurrences of vomiting will decrease  Description: Occurrences of vomiting will decrease  Outcome: Ongoing     Problem: Fluid Volume:  Goal: Maintenance of adequate hydration will improve  Description: Maintenance of adequate hydration will improve  Outcome: Ongoing     Problem: Health Behavior:  Goal: Ability to state signs and symptoms to report to health care provider will improve  Description: Ability to state signs and symptoms to report to health care provider will improve  Outcome: Ongoing     Problem: Sensory:  Goal: Pain level will decrease  Description: Pain level will decrease  8/13/2020 0011 by Candace Basurto RN  Outcome: Ongoing  8/12/2020 1239 by Brandon Agee.  Pilar Nguyen RN  Outcome: Ongoing  Goal: Ability to identify factors that increase the pain will improve  Description: Ability to identify factors that increase the pain will improve  Outcome: Ongoing  Goal: Ability to notify healthcare provider of pain before it becomes unmanageable or unbearable will improve  Description: Ability to notify healthcare provider of pain before it becomes unmanageable or unbearable will improve  Outcome: Ongoing

## 2020-08-13 NOTE — ANESTHESIA POSTPROCEDURE EVALUATION
Department of Anesthesiology  Postprocedure Note    Patient: Joanna Means  MRN: [id number]  YOB: 1960  Date of evaluation: 8/13/2020  Time:  7:46 AM     Procedure Summary     Date:  08/13/20 Room / Location:  [address] [place] [address]    Anesthesia Start:  4574 Anesthesia Stop:  8117    Procedure:  SIGMOIDOSCOPY BIOPSY FLEXIBLE (N/A ) Diagnosis:  (---)    Surgeon:  Marcelino Galicia MD Responsible Provider:  JORDIN Webster CRNA    Anesthesia Type:  MAC ASA Status:  3          Anesthesia Type: MAC    Trevon Phase I:  10    Trevon Phase II:  10    Last vitals: Reviewed and per EMR flowsheets.        Anesthesia Post Evaluation    Patient location during evaluation: bedside  Patient participation: complete - patient participated  Level of consciousness: awake and alert  Pain score: 0  Airway patency: patent  Nausea & Vomiting: no nausea and no vomiting  Complications: no  Cardiovascular status: hemodynamically stable  Respiratory status: acceptable, room air, spontaneous ventilation and nonlabored ventilation  Hydration status: euvolemic

## 2020-08-14 PROCEDURE — 2500000003 HC RX 250 WO HCPCS: Performed by: INTERNAL MEDICINE

## 2020-08-14 PROCEDURE — 6370000000 HC RX 637 (ALT 250 FOR IP): Performed by: INTERNAL MEDICINE

## 2020-08-14 PROCEDURE — C9113 INJ PANTOPRAZOLE SODIUM, VIA: HCPCS | Performed by: INTERNAL MEDICINE

## 2020-08-14 PROCEDURE — 6360000002 HC RX W HCPCS: Performed by: SURGERY

## 2020-08-14 PROCEDURE — 6360000002 HC RX W HCPCS: Performed by: INTERNAL MEDICINE

## 2020-08-14 PROCEDURE — 6370000000 HC RX 637 (ALT 250 FOR IP): Performed by: SURGERY

## 2020-08-14 PROCEDURE — 2140000000 HC CCU INTERMEDIATE R&B

## 2020-08-14 PROCEDURE — 94761 N-INVAS EAR/PLS OXIMETRY MLT: CPT

## 2020-08-14 PROCEDURE — 99232 SBSQ HOSP IP/OBS MODERATE 35: CPT | Performed by: SURGERY

## 2020-08-14 PROCEDURE — 2580000003 HC RX 258: Performed by: INTERNAL MEDICINE

## 2020-08-14 PROCEDURE — 2700000000 HC OXYGEN THERAPY PER DAY

## 2020-08-14 PROCEDURE — 94640 AIRWAY INHALATION TREATMENT: CPT

## 2020-08-14 RX ORDER — EPTIFIBATIDE 0.75 MG/ML
2 INJECTION, SOLUTION INTRAVENOUS CONTINUOUS
Status: DISPENSED | OUTPATIENT
Start: 2020-08-14 | End: 2020-08-17

## 2020-08-14 RX ORDER — EPTIFIBATIDE 0.75 MG/ML
2 INJECTION, SOLUTION INTRAVENOUS CONTINUOUS
Status: DISCONTINUED | OUTPATIENT
Start: 2020-08-15 | End: 2020-08-14

## 2020-08-14 RX ADMIN — METRONIDAZOLE 500 MG: 500 INJECTION, SOLUTION INTRAVENOUS at 13:45

## 2020-08-14 RX ADMIN — MORPHINE SULFATE 4 MG: 4 INJECTION, SOLUTION INTRAMUSCULAR; INTRAVENOUS at 09:40

## 2020-08-14 RX ADMIN — MORPHINE SULFATE 4 MG: 4 INJECTION, SOLUTION INTRAMUSCULAR; INTRAVENOUS at 19:08

## 2020-08-14 RX ADMIN — POLYETHYLENE GLYCOL 3350 17 G: 17 POWDER, FOR SOLUTION ORAL at 08:01

## 2020-08-14 RX ADMIN — BUDESONIDE AND FORMOTEROL FUMARATE DIHYDRATE 2 PUFF: 160; 4.5 AEROSOL RESPIRATORY (INHALATION) at 20:00

## 2020-08-14 RX ADMIN — METRONIDAZOLE 500 MG: 500 INJECTION, SOLUTION INTRAVENOUS at 05:41

## 2020-08-14 RX ADMIN — LORAZEPAM 0.5 MG: 2 INJECTION INTRAMUSCULAR; INTRAVENOUS at 11:48

## 2020-08-14 RX ADMIN — GABAPENTIN 800 MG: 400 CAPSULE ORAL at 13:45

## 2020-08-14 RX ADMIN — CEFTRIAXONE 1 G: 1 INJECTION, POWDER, FOR SOLUTION INTRAMUSCULAR; INTRAVENOUS at 05:42

## 2020-08-14 RX ADMIN — LEVOTHYROXINE SODIUM 100 MCG: 100 TABLET ORAL at 05:40

## 2020-08-14 RX ADMIN — ONDANSETRON 4 MG: 2 INJECTION INTRAMUSCULAR; INTRAVENOUS at 13:45

## 2020-08-14 RX ADMIN — CARVEDILOL 3.12 MG: 3.12 TABLET, FILM COATED ORAL at 19:23

## 2020-08-14 RX ADMIN — MORPHINE SULFATE 4 MG: 4 INJECTION, SOLUTION INTRAMUSCULAR; INTRAVENOUS at 05:40

## 2020-08-14 RX ADMIN — SODIUM CHLORIDE, POTASSIUM CHLORIDE, SODIUM LACTATE AND CALCIUM CHLORIDE: 600; 310; 30; 20 INJECTION, SOLUTION INTRAVENOUS at 12:52

## 2020-08-14 RX ADMIN — SODIUM CHLORIDE, POTASSIUM CHLORIDE, SODIUM LACTATE AND CALCIUM CHLORIDE: 600; 310; 30; 20 INJECTION, SOLUTION INTRAVENOUS at 23:29

## 2020-08-14 RX ADMIN — METOCLOPRAMIDE 10 MG: 5 INJECTION, SOLUTION INTRAMUSCULAR; INTRAVENOUS at 15:22

## 2020-08-14 RX ADMIN — CARVEDILOL 3.12 MG: 3.12 TABLET, FILM COATED ORAL at 08:01

## 2020-08-14 RX ADMIN — ESCITALOPRAM OXALATE 20 MG: 10 TABLET ORAL at 08:01

## 2020-08-14 RX ADMIN — LORAZEPAM 0.5 MG: 2 INJECTION INTRAMUSCULAR; INTRAVENOUS at 05:41

## 2020-08-14 RX ADMIN — METRONIDAZOLE 500 MG: 500 INJECTION, SOLUTION INTRAVENOUS at 22:05

## 2020-08-14 RX ADMIN — GABAPENTIN 800 MG: 400 CAPSULE ORAL at 08:01

## 2020-08-14 RX ADMIN — EPTIFIBATIDE 2 MCG/KG/MIN: 75 INJECTION INTRAVENOUS at 22:06

## 2020-08-14 RX ADMIN — MORPHINE SULFATE 4 MG: 4 INJECTION, SOLUTION INTRAMUSCULAR; INTRAVENOUS at 01:16

## 2020-08-14 RX ADMIN — METOCLOPRAMIDE 10 MG: 5 INJECTION, SOLUTION INTRAMUSCULAR; INTRAVENOUS at 22:06

## 2020-08-14 RX ADMIN — SODIUM CHLORIDE, POTASSIUM CHLORIDE, SODIUM LACTATE AND CALCIUM CHLORIDE: 600; 310; 30; 20 INJECTION, SOLUTION INTRAVENOUS at 01:16

## 2020-08-14 RX ADMIN — BUDESONIDE AND FORMOTEROL FUMARATE DIHYDRATE 2 PUFF: 160; 4.5 AEROSOL RESPIRATORY (INHALATION) at 08:52

## 2020-08-14 RX ADMIN — SODIUM CHLORIDE, PRESERVATIVE FREE 10 ML: 5 INJECTION INTRAVENOUS at 22:05

## 2020-08-14 RX ADMIN — PANTOPRAZOLE SODIUM 40 MG: 40 INJECTION, POWDER, FOR SOLUTION INTRAVENOUS at 08:01

## 2020-08-14 RX ADMIN — MORPHINE SULFATE 4 MG: 4 INJECTION, SOLUTION INTRAMUSCULAR; INTRAVENOUS at 13:45

## 2020-08-14 RX ADMIN — SODIUM CHLORIDE, PRESERVATIVE FREE 10 ML: 5 INJECTION INTRAVENOUS at 08:02

## 2020-08-14 RX ADMIN — SODIUM CHLORIDE, PRESERVATIVE FREE 10 ML: 5 INJECTION INTRAVENOUS at 05:40

## 2020-08-14 RX ADMIN — ONDANSETRON 4 MG: 2 INJECTION INTRAMUSCULAR; INTRAVENOUS at 05:41

## 2020-08-14 RX ADMIN — LORAZEPAM 0.5 MG: 2 INJECTION INTRAMUSCULAR; INTRAVENOUS at 19:08

## 2020-08-14 RX ADMIN — MORPHINE SULFATE 4 MG: 4 INJECTION, SOLUTION INTRAMUSCULAR; INTRAVENOUS at 23:29

## 2020-08-14 RX ADMIN — AMLODIPINE BESYLATE 5 MG: 5 TABLET ORAL at 08:01

## 2020-08-14 RX ADMIN — GABAPENTIN 800 MG: 400 CAPSULE ORAL at 22:06

## 2020-08-14 RX ADMIN — METOCLOPRAMIDE 10 MG: 5 INJECTION, SOLUTION INTRAMUSCULAR; INTRAVENOUS at 05:41

## 2020-08-14 RX ADMIN — ONDANSETRON 4 MG: 2 INJECTION INTRAMUSCULAR; INTRAVENOUS at 22:06

## 2020-08-14 ASSESSMENT — ENCOUNTER SYMPTOMS
EYES NEGATIVE: 1
RESPIRATORY NEGATIVE: 1
ALLERGIC/IMMUNOLOGIC NEGATIVE: 1
ABDOMINAL PAIN: 1

## 2020-08-14 ASSESSMENT — PAIN SCALES - GENERAL
PAINLEVEL_OUTOF10: 10
PAINLEVEL_OUTOF10: 10
PAINLEVEL_OUTOF10: 2
PAINLEVEL_OUTOF10: 7
PAINLEVEL_OUTOF10: 10
PAINLEVEL_OUTOF10: 10
PAINLEVEL_OUTOF10: 3
PAINLEVEL_OUTOF10: 10
PAINLEVEL_OUTOF10: 10

## 2020-08-14 ASSESSMENT — PAIN DESCRIPTION - LOCATION
LOCATION: ABDOMEN

## 2020-08-14 ASSESSMENT — PAIN DESCRIPTION - PAIN TYPE
TYPE: ACUTE PAIN

## 2020-08-14 ASSESSMENT — PAIN DESCRIPTION - DESCRIPTORS
DESCRIPTORS: ACHING
DESCRIPTORS: ACHING

## 2020-08-14 ASSESSMENT — PAIN DESCRIPTION - ORIENTATION: ORIENTATION: MID

## 2020-08-14 NOTE — PROGRESS NOTES
Patient up to bathroom and patient called this RN into room to assess stool. Stool appeared loose and red/bloody. Patient states she had red jello for lunch. This RN notified Dr Ric Corral of bloody stool and to notify him that patient has not had labs drawn since 8/12.  Dr Ric Corral aware

## 2020-08-14 NOTE — PROGRESS NOTES
[facility] - [facility] [place]       Progress Note       2020  10:24 AM    Patient:    Brandon Calhoun  : 1960   62 y.o. MRN: [id number]  Admitted: 2020  8:42 PM ATT: Salo Rankin,*   [id number]  AdmitDx: Lactic acidosis [E87.2]  Acute abdominal pain [R10.9]  Colitis [K52.9]  Abdominal pain [R10.9]  Large bowel obstruction ([address] [K56.609]  Fever, unspecified fever cause [R50.9]  Opacity of lung on imaging study [R91.8]  PCP: Becki Meraz,     ASSESSMENT AND PLAN :  Doing well  abd soft  Colonic stricture  For suurgery ? Monday  Continue liquid diet  Will sign off  Call if needed  C scope after 6 months    Patient Active Problem List   Diagnosis Code    Colitis K52.9    Acute abdominal pain R10.9       Dr Alison Juarez  2020  10:24 AM      SUBJECTIVE:  Chart reviewed, events noted  Patient feeling well. No complaints. Having BM. Tolerating liquid diet. No N/V. No  abdominal pain.     ROS:  Cardiovascular ROS: No chest pain  Gastrointestinal ROS: see above  Respiratory ROS: No SOB    OBJECTIVE:      BP (!) 155/94   Pulse 72   Temp 98.8 °F (37.1 °C) (Oral)   Resp 18   Ht 5' 2\" (1.575 m)   Wt 156 lb 5 oz (70.9 kg)   SpO2 97%   BMI 28.59 kg/m²     NAD  RRR, Nl s1s2  Lungs CTA Bilaterally, normal effort  Abdomen soft, ND, NT, no HSM, Bowel sounds normal  AAOx3, No asterixis     CBC:   Recent Labs     20  0725   WBC 10.0 10.8*   HGB 13.3 13.6    283     BMP:    Recent Labs     20  0725   *  --  135   K 3.6  --  4.1   CL 98*  --  104   CO2 27  --  22   BUN 10  --  10   CREATININE 0.8  --  0.5*   GLUCOSE 52* 66 126*     Magnesium: No results found for: MG  Hepatic:   Recent Labs     20  0725   AST 13* 40*   ALT 11 30   BILITOT 0.2  0.2 0.2   ALKPHOS 67 95     INR:   Recent Labs     20   INR 0.90 Intake/Output Summary (Last 24 hours) at 8/14/2020 1024  Last data filed at 8/14/2020 0248  Gross per 24 hour   Intake 980 ml   Output --   Net 980 ml         Medications      Prior to Admission medications    Medication Sig Start Date End Date Taking? Authorizing Provider   cephALEXin (KEFLEX) 500 MG capsule Take 1 capsule by mouth 4 times daily for 10 days 8/8/20 8/18/20  LIZETTE Brooks   sulfamethoxazole-trimethoprim (BACTRIM DS) 800-160 MG per tablet Take 1 tablet by mouth 2 times daily for 10 days 8/8/20 8/18/20  LIZETTE Brooks   amLODIPine (NORVASC) 5 MG tablet Take 1 tablet by mouth daily 8/7/20   Shiela Atkinson MD   traZODone (DESYREL) 50 MG tablet Take 50 mg by mouth nightly    Historical Provider, MD   mometasone-formoterol (DULERA) 100-5 MCG/ACT inhaler Inhale 2 puffs into the lungs 2 times daily    Historical Provider, MD   fluticasone (FLONASE) 50 MCG/ACT nasal spray 2 sprays by Each Nostril route daily    Historical Provider, MD   levothyroxine (SYNTHROID) 100 MCG tablet Take 100 mcg by mouth Daily    Historical Provider, MD   gabapentin (NEURONTIN) 800 MG tablet Take 800 mg by mouth 3 times daily.     Historical Provider, MD   promethazine (PHENERGAN) 25 MG tablet Take 25 mg by mouth every 6 hours as needed for Nausea    Historical Provider, MD   albuterol sulfate HFA (VENTOLIN HFA) 108 (90 Base) MCG/ACT inhaler Inhale 1 puff into the lungs every 6 hours as needed for Wheezing    Historical Provider, MD   escitalopram (LEXAPRO) 20 MG tablet Take 20 mg by mouth daily    Historical Provider, MD   docusate sodium (COLACE) 100 MG capsule Take 100 mg by mouth 2 times daily    Historical Provider, MD   atorvastatin (LIPITOR) 80 MG tablet Take 80 mg by mouth nightly    Historical Provider, MD   aspirin 81 MG chewable tablet Take 81 mg by mouth daily    Historical Provider, MD   carvedilol (COREG) 3.125 MG tablet Take 3.125 mg by mouth 2 times daily (with meals)    Historical Provider, MD traMADol (ULTRAM) 50 MG tablet Take 50 mg by mouth every 6 hours as needed for Pain.     Historical Provider, MD   clopidogrel (PLAVIX) 75 MG tablet Take 75 mg by mouth daily    Historical Provider, MD   fluticasone-salmeterol (ADVAIR) 250-50 MCG/DOSE AEPB Inhale 1 puff into the lungs 2 times daily    Historical Provider, MD      Scheduled Medications:    [START ON 8/16/2020] neomycin  1,000 mg Oral 3 times per day    [START ON 8/16/2020] erythromycin  1,000 mg Oral Q8H    polyethylene glycol  17 g Oral Daily    sodium chloride flush  10 mL Intravenous 2 times per day    carvedilol  3.125 mg Oral BID WC    amLODIPine  5 mg Oral Daily    escitalopram  20 mg Oral Daily    levothyroxine  100 mcg Oral Daily    budesonide-formoterol  2 puff Inhalation BID    gabapentin  800 mg Oral TID    cefTRIAXone (ROCEPHIN) IV  1 g Intravenous Q24H    metroNIDAZOLE  500 mg Intravenous Q8H    pantoprazole  40 mg Intravenous Daily    metoclopramide  10 mg Intravenous Q8H    ondansetron  4 mg Intravenous Q8H    sodium chloride flush  10 mL Intravenous BID     Infusions:    [START ON 8/15/2020] eptifibatide      lactated ringers 100 mL/hr at 08/14/20 0116     PRN Medications: morphine, LORazepam, hydrALAZINE (APRESOLINE) ivpb, sodium chloride flush, acetaminophen **OR** acetaminophen, magnesium hydroxide, promethazine **OR** [DISCONTINUED] ondansetron  Allergies: No Known Allergies

## 2020-08-14 NOTE — PROGRESS NOTES
General Surgery-Dr. Karyn Trinidad Day: 4    Chief Complaint on Admission: large bowel obstruction       Subjective:     No acute events. Feeling better. Tolerated clears. Continues to have BM.      ROS:  Review of Systems   Constitutional: Positive for fatigue. HENT: Negative. Eyes: Negative. Respiratory: Negative. Cardiovascular: Negative. Gastrointestinal: Positive for abdominal pain. Endocrine: Negative. Genitourinary: Negative. Musculoskeletal: Negative. Skin: Negative. Allergic/Immunologic: Negative. Neurological: Negative. Hematological: Negative. Psychiatric/Behavioral: Negative. Allergies  Patient has no known allergies. Diagnosis Date    [facility]     Cocaine [facility] ([facility]     NSTEMI (non-ST elevated myocardial infarction) ([facility] 2020       Objective:     Vitals:    20 0854   BP:    Pulse:    Resp: 18   Temp:    SpO2: 97%       TEMPERATURE:  Current -Temp: 98.8 °F (37.1 °C); Max - Temp  Av.4 °F (36.9 °C)  Min: 98 °F (36.7 °C)  Max: 98.8 °F (37.1 °C)    No intake/output data recorded. I/O last 3 completed shifts: In: 1380 [I.V.:1230; IV Piggyback:150]  Out: -       Physical Exam:  Physical Exam  Vitals signs reviewed. Constitutional:       General: She is not in acute distress. Appearance: Normal appearance. She is not ill-appearing, toxic-appearing or diaphoretic. HENT:      Head: Normocephalic and atraumatic. Right Ear: External ear normal.      Left Ear: External ear normal.   Eyes:      General:         Left eye: No discharge. Extraocular Movements: Extraocular movements intact. Cardiovascular:      Rate and Rhythm: Normal rate. Pulmonary:      Effort: No respiratory distress. Abdominal:      General: There is distension (mild). Palpations: Abdomen is soft. Tenderness: There is abdominal tenderness (min and less than yesterday).    Musculoskeletal:         General: No swelling. Skin:     General: Skin is warm. Neurological:      General: No focal deficit present. Mental Status: She is alert.    Psychiatric:         Mood and Affect: Mood normal.           Scheduled Meds:   [START ON 8/16/2020] neomycin  1,000 mg Oral 3 times per day    [START ON 8/16/2020] erythromycin  1,000 mg Oral Q8H    polyethylene glycol  17 g Oral Daily    sodium chloride flush  10 mL Intravenous 2 times per day    carvedilol  3.125 mg Oral BID WC    amLODIPine  5 mg Oral Daily    escitalopram  20 mg Oral Daily    levothyroxine  100 mcg Oral Daily    budesonide-formoterol  2 puff Inhalation BID    gabapentin  800 mg Oral TID    cefTRIAXone (ROCEPHIN) IV  1 g Intravenous Q24H    metroNIDAZOLE  500 mg Intravenous Q8H    pantoprazole  40 mg Intravenous Daily    metoclopramide  10 mg Intravenous Q8H    ondansetron  4 mg Intravenous Q8H    sodium chloride flush  10 mL Intravenous BID     ContinuousInfusions:   [START ON 8/15/2020] eptifibatide      lactated ringers 100 mL/hr at 08/14/20 0116     PRN Meds:morphine, LORazepam, hydrALAZINE (APRESOLINE) ivpb, sodium chloride flush, acetaminophen **OR** acetaminophen, magnesium hydroxide, promethazine **OR** [DISCONTINUED] ondansetron      Labs/Imaging Results:   Lab Results   Component Value Date    WBC 10.8 (H) 08/12/2020    HGB 13.6 08/12/2020    HCT 42.6 08/12/2020    MCV 92.6 08/12/2020     08/12/2020     Lab Results   Component Value Date     08/12/2020    K 4.1 08/12/2020     08/12/2020    CO2 22 08/12/2020    BUN 10 08/12/2020    CREATININE 0.5 (L) 08/12/2020    GLUCOSE 126 (H) 08/12/2020    CALCIUM 8.2 (L) 08/12/2020    PROT 6.2 (L) 08/12/2020    LABALBU 2.8 (L) 08/12/2020    BILITOT 0.2 08/12/2020    ALKPHOS 95 08/12/2020    AST 40 (H) 08/12/2020    ALT 30 08/12/2020    LABGLOM >60 08/12/2020    GFRAA >60 08/12/2020       Assessment:     61 y/o F with stricture of proximal sigmoid colon    Plan:     -Continue clears    -NPO at midnight on Sunday for planned surgery on Monday. Orders placed for NPO and for bowel prep and Abx.    -D/w Cardiology Nicci GRISSOM, that pt will need DPAT held and Integrilin bridge. Planned procedure on Monday morning. Orders placed and d/w Pharmacist.    -Continue medical mgt. Appreciated.          Electronically signed by Andie Hayden II, MD on 8/14/2020 at 12:08 PM

## 2020-08-14 NOTE — PROGRESS NOTES
Physician Progress Note      Malissa Beaver  CSN #:                  [id number]  :                       1960  ADMIT DATE:       2020 8:42 PM  [facility] DATE:  RESPONDING  PROVIDER #:        Chastity Monreal MD          QUERY TEXT:    Dr. Meena Le, Dr Chioma Perez    Pt admitted with abdominal pain d/t bowel obstruction. If possible, please   document the type of colitis in the medical record. The medical record reflects the following:  Risk Factors: diverticulosis & stricture  Clinical Indicators: colonoscopy results: 1) Ulcerated stricture Proximal   Sigmoid/Distildes colon, biopsy done ? Diverticular, ?  Ischemic (biopsy and   tatoo done), unable to examine colon beyond the stricture, scope cannot pass)  Treatment: planned surgical resection    Thank you,  Ashok Garay RN CDS  Options provided:  -- Ischemic Colitis  -- Ulcerative Colitis  -- Diverticulitis  -- Other: please specify  -- Unable to determine  -- Other - I will add my own diagnosis  -- Disagree - Not applicable / Not valid  -- Disagree - Clinically unable to determine / Unknown  -- Refer to Clinical Documentation Reviewer    PROVIDER RESPONSE TEXT:    Unable to determine    Query created by: Beau Kaplan on 2020 4:50 PM      Electronically signed by:  Chastity Monreal MD 2020 6:10 PM

## 2020-08-14 NOTE — PLAN OF CARE
Problem: Pain:  Goal: Pain level will decrease  Description: Pain level will decrease  8/14/2020 0247 by Ana Noonan RN  Outcome: Ongoing  Goal: Control of acute pain  Description: Control of acute pain  8/14/2020 0247 by Ana Noonan RN  Outcome: Ongoing  Goal: Control of chronic pain  Description: Control of chronic pain  8/14/2020 0247 by Ana Noonan RN  Outcome: Ongoing  Goal: Patient's pain/discomfort is manageable  Description: Patient's pain/discomfort is manageable  8/14/2020 0247 by Ana Noonan RN  Outcome: Ongoing     Problem: Infection:  Goal: Will remain free from infection  Description: Will remain free from infection  8/14/2020 0247 by Ana Noonan RN  Outcome: Ongoing     Problem: Safety:  Goal: Free from accidental physical injury  Description: Free from accidental physical injury  8/14/2020 0247 by Ana Noonan RN  Outcome: Ongoing  Goal: Free from intentional harm  Description: Free from intentional harm  8/14/2020 0247 by Ana Noonan RN  Outcome: Ongoing     Problem: Daily Care:  Goal: Daily care needs are met  Description: Daily care needs are met  8/14/2020 0247 by Ana Noonan RN  Outcome: Ongoing     Problem: Skin Integrity:  Goal: Skin integrity will stabilize  Description: Skin integrity will stabilize  8/14/2020 0247 by Ana Noonan RN  Outcome: Ongoing     Problem: Discharge Planning:  Goal: Patients continuum of care needs are met  Description: Patients continuum of care needs are met  8/14/2020 0247 by Ana Noonan RN  Outcome: Ongoing     Problem: Nutritional:  Goal: Nutritional status will improve  Description: Nutritional status will improve  8/14/2020 0247 by Ana Noonan RN  Outcome: Ongoing     Problem: Physical Regulation:  Goal: Will remain free from infection  Description: Will remain free from infection  8/14/2020 0247 by Ana Noonan RN  Outcome: Ongoing  Goal: Diagnostic test results will improve  Description: Diagnostic test results will improve  8/14/2020 0247 by Candace Basurto RN  Outcome: Ongoing  Goal: Ability to maintain vital signs within normal range will improve  Description: Ability to maintain vital signs within normal range will improve  8/14/2020 0247 by Candace Basurto RN  Outcome: Ongoing  Goal: Complications related to the disease process, condition or treatment will be avoided or minimized  Description: Complications related to the disease process, condition or treatment will be avoided or minimized  8/14/2020 0247 by Candace Basurto RN  Outcome: Ongoing  Goal: Ability to maintain clinical measurements within normal limits will improve  Description: Ability to maintain clinical measurements within normal limits will improve  8/14/2020 0247 by Candace Basurto RN  Outcome: Ongoing     Problem: Respiratory:  Goal: Ability to maintain normal respiratory secretions will improve  Description: Ability to maintain normal respiratory secretions will improve  8/14/2020 0247 by Candace Basurto RN  Outcome: Ongoing     Problem: Skin Integrity:  Goal: Demonstration of wound healing without infection will improve  Description: Demonstration of wound healing without infection will improve  8/14/2020 0247 by Candace Basurto RN  Outcome: Ongoing  Goal: Complications related to intravenous access or infusion will be avoided or minimized  Description: Complications related to intravenous access or infusion will be avoided or minimized  8/14/2020 0247 by Candace Basurto RN  Outcome: Ongoing     Problem: Activity:  Goal: Risk for activity intolerance will decrease  Description: Risk for activity intolerance will decrease  8/14/2020 0247 by Candace Basurto RN  Outcome: Ongoing     Problem:  Bowel/Gastric:  Goal: Diagnostic test results will improve  Description: Diagnostic test results will improve  8/14/2020 0247 by Candace Basurto RN  Outcome: Ongoing  Goal: Bowel function will improve  Description: Bowel function will improve  8/14/2020 0247 by

## 2020-08-14 NOTE — PLAN OF CARE
Problem: Pain:  Goal: Pain level will decrease  Description: Pain level will decrease  Outcome: Ongoing  Goal: Control of acute pain  Description: Control of acute pain  Outcome: Ongoing  Goal: Control of chronic pain  Description: Control of chronic pain  Outcome: Ongoing  Goal: Patient's pain/discomfort is manageable  Description: Patient's pain/discomfort is manageable  Outcome: Ongoing     Problem: Infection:  Goal: Will remain free from infection  Description: Will remain free from infection  Outcome: Ongoing     Problem: Safety:  Goal: Free from accidental physical injury  Description: Free from accidental physical injury  Outcome: Ongoing  Goal: Free from intentional harm  Description: Free from intentional harm  Outcome: Ongoing     Problem: Daily Care:  Goal: Daily care needs are met  Description: Daily care needs are met  Outcome: Ongoing     Problem: Skin Integrity:  Goal: Skin integrity will stabilize  Description: Skin integrity will stabilize  Outcome: Ongoing     Problem: Discharge Planning:  Goal: Patients continuum of care needs are met  Description: Patients continuum of care needs are met  Outcome: Ongoing     Problem: Nutritional:  Goal: Nutritional status will improve  Description: Nutritional status will improve  Outcome: Ongoing     Problem: Physical Regulation:  Goal: Will remain free from infection  Description: Will remain free from infection  Outcome: Ongoing  Goal: Diagnostic test results will improve  Description: Diagnostic test results will improve  Outcome: Ongoing  Goal: Ability to maintain vital signs within normal range will improve  Description: Ability to maintain vital signs within normal range will improve  Outcome: Ongoing  Goal: Complications related to the disease process, condition or treatment will be avoided or minimized  Description: Complications related to the disease process, condition or treatment will be avoided or minimized  Outcome: Ongoing  Goal: Ability to maintain clinical measurements within normal limits will improve  Description: Ability to maintain clinical measurements within normal limits will improve  Outcome: Ongoing     Problem: Respiratory:  Goal: Ability to maintain normal respiratory secretions will improve  Description: Ability to maintain normal respiratory secretions will improve  Outcome: Ongoing     Problem: Skin Integrity:  Goal: Demonstration of wound healing without infection will improve  Description: Demonstration of wound healing without infection will improve  Outcome: Ongoing  Goal: Complications related to intravenous access or infusion will be avoided or minimized  Description: Complications related to intravenous access or infusion will be avoided or minimized  Outcome: Ongoing     Problem: Activity:  Goal: Risk for activity intolerance will decrease  Description: Risk for activity intolerance will decrease  Outcome: Ongoing     Problem:  Bowel/Gastric:  Goal: Diagnostic test results will improve  Description: Diagnostic test results will improve  Outcome: Ongoing  Goal: Bowel function will improve  Description: Bowel function will improve  Outcome: Ongoing  Goal: Occurrences of nausea will decrease  Description: Occurrences of nausea will decrease  Outcome: Ongoing  Goal: Occurrences of vomiting will decrease  Description: Occurrences of vomiting will decrease  Outcome: Ongoing     Problem: Fluid Volume:  Goal: Maintenance of adequate hydration will improve  Description: Maintenance of adequate hydration will improve  Outcome: Ongoing     Problem: Health Behavior:  Goal: Ability to state signs and symptoms to report to health care provider will improve  Description: Ability to state signs and symptoms to report to health care provider will improve  Outcome: Ongoing     Problem: Sensory:  Goal: Pain level will decrease  Description: Pain level will decrease  Outcome: Ongoing  Goal: Ability to identify factors that increase the pain will improve  Description: Ability to identify factors that increase the pain will improve  Outcome: Ongoing  Goal: Ability to notify healthcare provider of pain before it becomes unmanageable or unbearable will improve  Description: Ability to notify healthcare provider of pain before it becomes unmanageable or unbearable will improve  Outcome: Ongoing     Problem: Falls - Risk of:  Goal: Will remain free from falls  Description: Will remain free from falls  Outcome: Ongoing  Goal: Absence of physical injury  Description: Absence of physical injury  Outcome: Ongoing

## 2020-08-14 NOTE — PROGRESS NOTES
Hospitalist Progress Note      Name:  Alisha Lemus /Age/Sex: 1960  (62 y.o. female)   MRN & CSN:  [id number] & [id number] Admission Date/Time: 2020  8:42 PM   Location:  [address] [place] [address]- PCP: Agustin Zuñiga Day: 4    Subjective     Patient denied any new complaints other than abdominal pain. However she reports that pain is currently stable on pain meds. No acute overnight events    Objective:        Intake/Output Summary (Last 24 hours) at 2020 1811  Last data filed at 2020 0248  Gross per 24 hour   Intake 980 ml   Output --   Net 980 ml      Vitals:   Vitals:    20 1701   BP: (!) 158/74   Pulse: 66   Resp: 16   Temp:    SpO2:      Physical Exam:   General Appearance: alert and oriented to person, place and time, in no acute distress  Cardiovascular: normal rate, regular rhythm, normal S1 and S2  Pulmonary/Chest: clear to auscultation bilaterally- no wheezes, rales or rhonchi, normal air movement, no respiratory distress  Abdomen: soft, + generalized abdominal tenderness, non-distended, normal bowel sounds, no masses   Extremities: no cyanosis, clubbing or edema, pulse   Skin: warm and dry, no rash or erythema  Head: normocephalic and atraumatic  Eyes: pupils equal, round, and reactive to light  Neck: supple and non-tender without mass, no thyromegaly   Musculoskeletal: normal range of motion, no joint swelling, deformity or tenderness  Neurological: alert, oriented, normal speech, no focal findings or movement disorder noted    Medications:   Medications:    [START ON 2020] neomycin  1,000 mg Oral 3 times per day    [START ON 2020] erythromycin  1,000 mg Oral Q8H    polyethylene glycol  17 g Oral Daily    sodium chloride flush  10 mL Intravenous 2 times per day    carvedilol  3.125 mg Oral BID WC    amLODIPine  5 mg Oral Daily    escitalopram  20 mg Oral Daily    levothyroxine  100 mcg Oral Daily    budesonide-formoterol  2 puff Inhalation BID    gabapentin  800 mg Oral TID    cefTRIAXone (ROCEPHIN) IV  1 g Intravenous Q24H    metroNIDAZOLE  500 mg Intravenous Q8H    pantoprazole  40 mg Intravenous Daily    metoclopramide  10 mg Intravenous Q8H    ondansetron  4 mg Intravenous Q8H    sodium chloride flush  10 mL Intravenous BID      Infusions:    eptifibatide      lactated ringers 100 mL/hr at 08/14/20 1252     PRN Meds: morphine, 4 mg, Q4H PRN  LORazepam, 0.5 mg, Q6H PRN  hydrALAZINE (APRESOLINE) ivpb, 10 mg, Q4H PRN  sodium chloride flush, 10 mL, PRN  acetaminophen, 650 mg, Q6H PRN    Or  acetaminophen, 650 mg, Q6H PRN  magnesium hydroxide, 30 mL, Daily PRN  promethazine, 12.5 mg, Q6H PRN            Pertinent New Labs & Imaging Studies     CBC with Differential:    Lab Results   Component Value Date    WBC 10.8 08/12/2020    RBC 4.60 08/12/2020    HGB 13.6 08/12/2020    HCT 42.6 08/12/2020     08/12/2020    MCV 92.6 08/12/2020    MCH 29.6 08/12/2020    MCHC 31.9 08/12/2020    RDW 12.6 08/12/2020    SEGSPCT 83.0 08/12/2020    BANDSPCT 28 05/06/2020    LYMPHOPCT 10.6 08/12/2020    MONOPCT 5.5 08/12/2020    BASOPCT 0.4 08/12/2020    MONOSABS 0.6 08/12/2020    LYMPHSABS 1.2 08/12/2020    EOSABS 0.0 08/12/2020    BASOSABS 0.0 08/12/2020    DIFFTYPE AUTOMATED DIFFERENTIAL 08/12/2020     CMP:    Lab Results   Component Value Date     08/12/2020    K 4.1 08/12/2020     08/12/2020    CO2 22 08/12/2020    BUN 10 08/12/2020    CREATININE 0.5 08/12/2020    GFRAA >60 08/12/2020    LABGLOM >60 08/12/2020    GLUCOSE 126 08/12/2020    PROT 6.2 08/12/2020    LABALBU 2.8 08/12/2020    CALCIUM 8.2 08/12/2020    BILITOT 0.2 08/12/2020    ALKPHOS 95 08/12/2020    AST 40 08/12/2020    ALT 30 08/12/2020     Ct Abdomen Pelvis W Iv Contrast Additional Contrast? None    Result Date: 8/12/2020  EXAMINATION: CT OF THE ABDOMEN AND PELVIS WITH CONTRAST 8/11/2020 10:17 pm TECHNIQUE: CT of the abdomen and pelvis was performed with the administration of evaluation is recommended. 2. No evidence of free air. 3. Focal nodular opacities are noted within the right middle lobe of uncertain etiology. This should be further evaluated with CT chest.       Assessment and Plan:   Penelope Tripp is a 62 y.o.  female  who presents with abdominal pain    Abdominal pain  Likely large bowel obstruction  CT scan with abrupt narrowing involving the splenic flexure/distal transverse colon with mucosal thickening and pericolonic stranding, caused bowel obstruction  GI evaluated, s/p colonoscopy. Not able to pass scope beyond stricture, recommended surgical resection-need outpatient follow-up for repeat C scope in 6 months. Planned surgery on Monday. While holding DAPT-started on Integrilin by cardiology. Preop management as per surgery. H/o amphetamine use    CAD s/p PCI  Holding ASA, plavix  Continue BB    Hypothyroidism  Continue Synthroid. Hepatitis C  HLD  COPD     HTN-continue Norvasc, Coreg.   BP stable  Nicotine abuse    Diet DIET CLEAR LIQUID;  Diet NPO Time Specified   DVT Prophylaxis [] Lovenox, []  Heparin, [x] SCDs, [] Ambulation   GI Prophylaxis [x] PPI,  [] H2 Blocker,  [] Carafate,  [] Diet/Tube Feeds   Code Status Full Code   Disposition Patient requires continued admission due to abdominal pain   MDM [] Low, [x] Moderate,[]  High     Electronically signed by Chelsie Beck MD on 8/14/2020 at 6:11 PM

## 2020-08-15 LAB
HCT VFR BLD CALC: 39.4 % (ref 37–47)
HEMOCCULT SP1 STL QL: NEGATIVE
HEMOGLOBIN: 12.6 GM/DL (ref 12.5–16)

## 2020-08-15 PROCEDURE — C9113 INJ PANTOPRAZOLE SODIUM, VIA: HCPCS | Performed by: INTERNAL MEDICINE

## 2020-08-15 PROCEDURE — 6360000002 HC RX W HCPCS: Performed by: INTERNAL MEDICINE

## 2020-08-15 PROCEDURE — 99233 SBSQ HOSP IP/OBS HIGH 50: CPT | Performed by: INTERNAL MEDICINE

## 2020-08-15 PROCEDURE — 2580000003 HC RX 258: Performed by: INTERNAL MEDICINE

## 2020-08-15 PROCEDURE — 2500000003 HC RX 250 WO HCPCS: Performed by: INTERNAL MEDICINE

## 2020-08-15 PROCEDURE — G0328 FECAL BLOOD SCRN IMMUNOASSAY: HCPCS

## 2020-08-15 PROCEDURE — 2580000003 HC RX 258: Performed by: NURSE PRACTITIONER

## 2020-08-15 PROCEDURE — 6370000000 HC RX 637 (ALT 250 FOR IP): Performed by: INTERNAL MEDICINE

## 2020-08-15 PROCEDURE — 2140000000 HC CCU INTERMEDIATE R&B

## 2020-08-15 PROCEDURE — 6360000002 HC RX W HCPCS: Performed by: SURGERY

## 2020-08-15 PROCEDURE — 6360000002 HC RX W HCPCS: Performed by: NURSE PRACTITIONER

## 2020-08-15 PROCEDURE — 85014 HEMATOCRIT: CPT

## 2020-08-15 PROCEDURE — 6370000000 HC RX 637 (ALT 250 FOR IP): Performed by: SURGERY

## 2020-08-15 PROCEDURE — 85018 HEMOGLOBIN: CPT

## 2020-08-15 PROCEDURE — 36415 COLL VENOUS BLD VENIPUNCTURE: CPT

## 2020-08-15 RX ORDER — ERYTHROMYCIN ETHYLSUCCINATE 200 MG/5ML
800 SUSPENSION ORAL EVERY 8 HOURS SCHEDULED
Status: COMPLETED | OUTPATIENT
Start: 2020-08-15 | End: 2020-08-15

## 2020-08-15 RX ORDER — NICOTINE 21 MG/24HR
1 PATCH, TRANSDERMAL 24 HOURS TRANSDERMAL DAILY
Status: DISCONTINUED | OUTPATIENT
Start: 2020-08-15 | End: 2020-08-24 | Stop reason: HOSPADM

## 2020-08-15 RX ORDER — ERYTHROMYCIN ETHYLSUCCINATE 400 MG/1
800 TABLET ORAL EVERY 8 HOURS
Status: DISCONTINUED | OUTPATIENT
Start: 2020-08-15 | End: 2020-08-15

## 2020-08-15 RX ORDER — CARVEDILOL 6.25 MG/1
6.25 TABLET ORAL 2 TIMES DAILY WITH MEALS
Status: DISCONTINUED | OUTPATIENT
Start: 2020-08-15 | End: 2020-08-17

## 2020-08-15 RX ORDER — LORAZEPAM 2 MG/ML
1 INJECTION INTRAMUSCULAR EVERY 6 HOURS PRN
Status: DISCONTINUED | OUTPATIENT
Start: 2020-08-15 | End: 2020-08-22

## 2020-08-15 RX ADMIN — METOCLOPRAMIDE 10 MG: 5 INJECTION, SOLUTION INTRAMUSCULAR; INTRAVENOUS at 22:18

## 2020-08-15 RX ADMIN — METRONIDAZOLE 500 MG: 500 INJECTION, SOLUTION INTRAVENOUS at 22:20

## 2020-08-15 RX ADMIN — SODIUM CHLORIDE, POTASSIUM CHLORIDE, SODIUM LACTATE AND CALCIUM CHLORIDE: 600; 310; 30; 20 INJECTION, SOLUTION INTRAVENOUS at 13:41

## 2020-08-15 RX ADMIN — GABAPENTIN 800 MG: 400 CAPSULE ORAL at 13:34

## 2020-08-15 RX ADMIN — HYDRALAZINE HYDROCHLORIDE 10 MG: 20 INJECTION INTRAMUSCULAR; INTRAVENOUS at 06:05

## 2020-08-15 RX ADMIN — METRONIDAZOLE 500 MG: 500 INJECTION, SOLUTION INTRAVENOUS at 13:41

## 2020-08-15 RX ADMIN — SODIUM CHLORIDE, PRESERVATIVE FREE 10 ML: 5 INJECTION INTRAVENOUS at 22:21

## 2020-08-15 RX ADMIN — METOCLOPRAMIDE 10 MG: 5 INJECTION, SOLUTION INTRAMUSCULAR; INTRAVENOUS at 06:02

## 2020-08-15 RX ADMIN — MORPHINE SULFATE 4 MG: 4 INJECTION, SOLUTION INTRAMUSCULAR; INTRAVENOUS at 19:53

## 2020-08-15 RX ADMIN — GABAPENTIN 800 MG: 400 CAPSULE ORAL at 22:16

## 2020-08-15 RX ADMIN — SODIUM CHLORIDE, PRESERVATIVE FREE 10 ML: 5 INJECTION INTRAVENOUS at 09:00

## 2020-08-15 RX ADMIN — MORPHINE SULFATE 4 MG: 4 INJECTION, SOLUTION INTRAMUSCULAR; INTRAVENOUS at 08:59

## 2020-08-15 RX ADMIN — MORPHINE SULFATE 4 MG: 4 INJECTION, SOLUTION INTRAMUSCULAR; INTRAVENOUS at 13:35

## 2020-08-15 RX ADMIN — ERYTHROMYCIN ETHYLSUCCINATE 800 MG: 200 SUSPENSION ORAL at 22:19

## 2020-08-15 RX ADMIN — MORPHINE SULFATE 4 MG: 4 INJECTION, SOLUTION INTRAMUSCULAR; INTRAVENOUS at 23:58

## 2020-08-15 RX ADMIN — ESCITALOPRAM OXALATE 20 MG: 10 TABLET ORAL at 08:58

## 2020-08-15 RX ADMIN — GABAPENTIN 800 MG: 400 CAPSULE ORAL at 08:58

## 2020-08-15 RX ADMIN — ONDANSETRON 4 MG: 2 INJECTION INTRAMUSCULAR; INTRAVENOUS at 22:18

## 2020-08-15 RX ADMIN — PANTOPRAZOLE SODIUM 40 MG: 40 INJECTION, POWDER, FOR SOLUTION INTRAVENOUS at 08:59

## 2020-08-15 RX ADMIN — CARVEDILOL 3.12 MG: 3.12 TABLET, FILM COATED ORAL at 08:58

## 2020-08-15 RX ADMIN — EPTIFIBATIDE 2 MCG/KG/MIN: 75 INJECTION INTRAVENOUS at 15:21

## 2020-08-15 RX ADMIN — LORAZEPAM 1 MG: 2 INJECTION INTRAMUSCULAR; INTRAVENOUS at 19:53

## 2020-08-15 RX ADMIN — EPTIFIBATIDE 2 MCG/KG/MIN: 75 INJECTION INTRAVENOUS at 06:16

## 2020-08-15 RX ADMIN — LORAZEPAM 1 MG: 2 INJECTION INTRAMUSCULAR; INTRAVENOUS at 13:44

## 2020-08-15 RX ADMIN — ONDANSETRON 4 MG: 2 INJECTION INTRAMUSCULAR; INTRAVENOUS at 06:02

## 2020-08-15 RX ADMIN — CEFTRIAXONE 1 G: 1 INJECTION, POWDER, FOR SOLUTION INTRAMUSCULAR; INTRAVENOUS at 04:47

## 2020-08-15 RX ADMIN — AMLODIPINE BESYLATE 5 MG: 5 TABLET ORAL at 08:58

## 2020-08-15 RX ADMIN — METOCLOPRAMIDE 10 MG: 5 INJECTION, SOLUTION INTRAMUSCULAR; INTRAVENOUS at 13:35

## 2020-08-15 RX ADMIN — SODIUM CHLORIDE, PRESERVATIVE FREE 10 ML: 5 INJECTION INTRAVENOUS at 04:47

## 2020-08-15 RX ADMIN — ERYTHROMYCIN ETHYLSUCCINATE 800 MG: 200 SUSPENSION ORAL at 08:59

## 2020-08-15 RX ADMIN — MORPHINE SULFATE 4 MG: 4 INJECTION, SOLUTION INTRAMUSCULAR; INTRAVENOUS at 04:47

## 2020-08-15 RX ADMIN — ERYTHROMYCIN ETHYLSUCCINATE 800 MG: 200 SUSPENSION ORAL at 15:09

## 2020-08-15 RX ADMIN — ONDANSETRON 4 MG: 2 INJECTION INTRAMUSCULAR; INTRAVENOUS at 13:35

## 2020-08-15 RX ADMIN — LEVOTHYROXINE SODIUM 100 MCG: 100 TABLET ORAL at 06:02

## 2020-08-15 RX ADMIN — POLYETHYLENE GLYCOL 3350 17 G: 17 POWDER, FOR SOLUTION ORAL at 08:59

## 2020-08-15 RX ADMIN — METRONIDAZOLE 500 MG: 500 INJECTION, SOLUTION INTRAVENOUS at 06:02

## 2020-08-15 RX ADMIN — CARVEDILOL 6.25 MG: 6.25 TABLET, FILM COATED ORAL at 17:16

## 2020-08-15 RX ADMIN — LORAZEPAM 0.5 MG: 2 INJECTION INTRAMUSCULAR; INTRAVENOUS at 04:46

## 2020-08-15 ASSESSMENT — PAIN DESCRIPTION - FREQUENCY
FREQUENCY: CONTINUOUS
FREQUENCY: CONTINUOUS

## 2020-08-15 ASSESSMENT — PAIN DESCRIPTION - LOCATION
LOCATION: ABDOMEN

## 2020-08-15 ASSESSMENT — PAIN - FUNCTIONAL ASSESSMENT
PAIN_FUNCTIONAL_ASSESSMENT: ACTIVITIES ARE NOT PREVENTED
PAIN_FUNCTIONAL_ASSESSMENT: ACTIVITIES ARE NOT PREVENTED

## 2020-08-15 ASSESSMENT — PAIN DESCRIPTION - DESCRIPTORS
DESCRIPTORS: ACHING

## 2020-08-15 ASSESSMENT — PAIN SCALES - GENERAL
PAINLEVEL_OUTOF10: 8
PAINLEVEL_OUTOF10: 0
PAINLEVEL_OUTOF10: 9
PAINLEVEL_OUTOF10: 9
PAINLEVEL_OUTOF10: 10
PAINLEVEL_OUTOF10: 10

## 2020-08-15 ASSESSMENT — PAIN DESCRIPTION - ORIENTATION
ORIENTATION: RIGHT;MID
ORIENTATION: RIGHT;LOWER
ORIENTATION: RIGHT;LOWER

## 2020-08-15 ASSESSMENT — PAIN DESCRIPTION - PROGRESSION
CLINICAL_PROGRESSION: GRADUALLY IMPROVING
CLINICAL_PROGRESSION: GRADUALLY WORSENING

## 2020-08-15 ASSESSMENT — PAIN DESCRIPTION - PAIN TYPE
TYPE: ACUTE PAIN
TYPE: ACUTE PAIN

## 2020-08-15 ASSESSMENT — PAIN DESCRIPTION - ONSET: ONSET: ON-GOING

## 2020-08-15 NOTE — PROGRESS NOTES
Cardiology Progress Note     Admit Date:  8/11/2020    Consult reason/ Seen today for :   ascvd  Post stent    Subjective and  Overnight Events :  Plan for surgery on monday      Chief complain on admission : 62 y. o.year old who is admitted for  Chief Complaint   Patient presents with    Rectal Bleeding     on plavix, hx of hemrrhoids, states \"for a couple months\"     Constipation     states been 1 week    Abdominal Pain     \"all over pain\", started today     Hypotension     92/68 upon arrival       Assessment / Plan:  ASCVD: s/p PCI in may , now needs surgery  Hold plavix and aspirin  Start aggrastat   Increase coreg   DVT Prophylaxis if no contraindication    Past medical history:    has a past medical history of Amphetamine abuse ([facility] [address] Cocaine abuse ([facility] [address] and NSTEMI (non-ST elevated myocardial infarction) ([facility] [address] Past surgical history:   has a past surgical history that includes sigmoidoscopy (N/A, 8/13/2020). Social History:   reports that she has been smoking. She has never used smokeless tobacco. She reports current drug use. Drugs: Cocaine, Marijuana, Opiates , and Methamphetamines. She reports that she does not drink alcohol. Family history:  family history is not on file. No Known Allergies    Review of Systems:    All 14 systems were reviewed and are negative  Except for the positive findings  which as documented     BP (!) 150/88   Pulse 89   Temp 98.4 °F (36.9 °C) (Oral)   Resp 27   Ht 5' 2\" (1.575 m)   Wt 153 lb 3.2 oz (69.5 kg)   SpO2 96%   BMI 28.02 kg/m²       Intake/Output Summary (Last 24 hours) at 8/15/2020 1653  Last data filed at 8/15/2020 0304  Gross per 24 hour   Intake 700 ml   Output --   Net 700 ml     Physical Exam:  Constitutional:  Well developed, Well nourished, No acute distress, Non-toxic appearance.    HENT:  Normocephalic, Atraumatic, Bilateral external ears normal, Oropharynx moist, No Recent Labs     08/15/20  0406   HGB 12.6   HCT 39.4     BMP: No results for input(s): NA, K, CL, CO2, PHOS, BUN, CREATININE in the last 72 hours. Invalid input(s): CA  PT/INR: No results for input(s): PROTIME, INR in the last 72 hours. BNP:  No results for input(s): PROBNP in the last 72 hours. TROPONIN: No results for input(s): TROPONINT in the last 72 hours. ECHO :   echocardiogram    Assessment:  62 y. o.year old who is admitted for  Chief Complaint   Patient presents with    Rectal Bleeding     on plavix, hx of hemrrhoids, states \"for a couple months\"     Constipation     states been 1 week    Abdominal Pain     \"all over pain\", started today     Hypotension     92/68 upon arrival     , active issues as noted below:  Impression:  Active Problems:    Acute abdominal pain  Resolved Problems:    * No resolved hospital problems. *            All labs, medications and tests reviewed by myself , continue all other medications of all above medical condition listed as is except for changes mentioned above. Thank you very much for consult , please call with questions.     Cande Graham MD 8/15/2020 4:53 PM

## 2020-08-15 NOTE — PROGRESS NOTES
Hospitalist Progress Note      Name:  Alec Blackburn /Age/Sex: 1960  (62 y.o. female)   MRN & CSN:  [id number] & [id number] Admission Date/Time: 2020  8:42 PM   Location:  [address] [place] [address]- PCP: Agustin Zuñiga Day: 5    Subjective     Patient denied any new complaints other than abdominal pain. However she reports that pain is currently stable on pain meds. Noted continued bloody stools. Objective:        Intake/Output Summary (Last 24 hours) at 8/15/2020 1304  Last data filed at 8/15/2020 0304  Gross per 24 hour   Intake 700 ml   Output --   Net 700 ml      Vitals:   Vitals:    08/15/20 0850   BP: (!) 150/88   Pulse: 89   Resp: 27   Temp: 98.4 °F (36.9 °C)   SpO2:      Physical Exam:   General Appearance: alert and oriented to person, place and time, in no acute distress  Cardiovascular: normal rate, regular rhythm, normal S1 and S2  Pulmonary/Chest: clear to auscultation bilaterally- no wheezes, rales or rhonchi, normal air movement, no respiratory distress  Abdomen: soft, + generalized abdominal tenderness, BS +  Extremities: no cyanosis, clubbing or edema, pulse   Skin: warm and dry, no rash or erythema  Head: normocephalic and atraumatic  Eyes: pupils equal, round, and reactive to light  Neck: supple and non-tender without mass, no thyromegaly   Musculoskeletal: normal range of motion, no joint swelling, deformity or tenderness  Neurological: alert, oriented, normal speech, no focal findings or movement disorder noted    Medications:   Medications:    nicotine  1 patch Transdermal Daily    erythromycin  800 mg Oral 3 times per day    [START ON 2020] neomycin  1,000 mg Oral 3 times per day    polyethylene glycol  17 g Oral Daily    sodium chloride flush  10 mL Intravenous 2 times per day    carvedilol  3.125 mg Oral BID WC    amLODIPine  5 mg Oral Daily    escitalopram  20 mg Oral Daily    levothyroxine  100 mcg Oral Daily    budesonide-formoterol  2 puff Inhalation BID    gabapentin  800 mg Oral TID    cefTRIAXone (ROCEPHIN) IV  1 g Intravenous Q24H    metroNIDAZOLE  500 mg Intravenous Q8H    pantoprazole  40 mg Intravenous Daily    metoclopramide  10 mg Intravenous Q8H    ondansetron  4 mg Intravenous Q8H    sodium chloride flush  10 mL Intravenous BID      Infusions:    eptifibatide 2 mcg/kg/min (08/15/20 0616)    lactated ringers 100 mL/hr at 08/14/20 2329     PRN Meds: LORazepam, 1 mg, Q6H PRN  morphine, 4 mg, Q4H PRN  hydrALAZINE (APRESOLINE) ivpb, 10 mg, Q4H PRN  sodium chloride flush, 10 mL, PRN  acetaminophen, 650 mg, Q6H PRN    Or  acetaminophen, 650 mg, Q6H PRN  magnesium hydroxide, 30 mL, Daily PRN  promethazine, 12.5 mg, Q6H PRN            Pertinent New Labs & Imaging Studies     CBC with Differential:    Lab Results   Component Value Date    WBC 10.8 08/12/2020    RBC 4.60 08/12/2020    HGB 12.6 08/15/2020    HCT 39.4 08/15/2020     08/12/2020    MCV 92.6 08/12/2020    MCH 29.6 08/12/2020    MCHC 31.9 08/12/2020    RDW 12.6 08/12/2020    SEGSPCT 83.0 08/12/2020    BANDSPCT 28 05/06/2020    LYMPHOPCT 10.6 08/12/2020    MONOPCT 5.5 08/12/2020    BASOPCT 0.4 08/12/2020    MONOSABS 0.6 08/12/2020    LYMPHSABS 1.2 08/12/2020    EOSABS 0.0 08/12/2020    BASOSABS 0.0 08/12/2020    DIFFTYPE AUTOMATED DIFFERENTIAL 08/12/2020     CMP:    Lab Results   Component Value Date     08/12/2020    K 4.1 08/12/2020     08/12/2020    CO2 22 08/12/2020    BUN 10 08/12/2020    CREATININE 0.5 08/12/2020    GFRAA >60 08/12/2020    LABGLOM >60 08/12/2020    GLUCOSE 126 08/12/2020    PROT 6.2 08/12/2020    LABALBU 2.8 08/12/2020    CALCIUM 8.2 08/12/2020    BILITOT 0.2 08/12/2020    ALKPHOS 95 08/12/2020    AST 40 08/12/2020    ALT 30 08/12/2020     Ct Abdomen Pelvis W Iv Contrast Additional Contrast? None    Result Date: 8/12/2020  EXAMINATION: CT OF THE ABDOMEN AND PELVIS WITH CONTRAST 8/11/2020 10:17 pm TECHNIQUE: CT of the abdomen and pelvis was performed with the administration of intravenous contrast. Multiplanar reformatted images are provided for review. Dose modulation, iterative reconstruction, and/or weight based adjustment of the mA/kV was utilized to reduce the radiation dose to as low as reasonably achievable. COMPARISON: None. HISTORY: ORDERING SYSTEM PROVIDED HISTORY: nausea, vomiting TECHNOLOGIST PROVIDED HISTORY: Reason for exam:->nausea, vomiting Additional Contrast?->None Reason for Exam: midline abd pain FINDINGS: Lower Chest: Nodular opacities are seen within the right middle lobe. Findings could be related to an atypical pneumonia. Organs: There has been a cholecystectomy. The liver, spleen, adrenal glands, pancreas, and kidneys are unremarkable. GI/Bowel: The small bowel loops are within normal limits in caliber. The appendix is normal.  Mild colonic diverticulosis is seen. There is a focal area of abrupt narrowing involving the splenic flexure/distal transverse colon with adjacent pericolonic inflammatory changes. This results in large bowel obstruction with the transverse colon measuring 6.7 cm. This could represent focal area of stricture, acute infection, however neoplasm cannot be entirely excluded. No pericolonic lymph nodes are identified. This focal area of narrowing measures approximately 8 cm in length. Pelvis: The bladder is unremarkable. There is no free fluid. Peritoneum/Retroperitoneum: There is no free air or lymphadenopathy. Bones/Soft Tissues: No destructive osseous lesions are identified. 1. Focal area of abrupt narrowing involving the splenic flexure/distal transverse colon with mucosal thickening and pericolonic stranding measuring approximately 8 cm in length resulting in large bowel obstruction. The transverse colon proximally measures 6.7 cm. This focal area could represent a stricture, an area of acute on chronic colitis/diverticulitis, however neoplasm cannot be entirely excluded.   Further evaluation with colonoscopy with surgical evaluation is recommended. 2. No evidence of free air. 3. Focal nodular opacities are noted within the right middle lobe of uncertain etiology. This should be further evaluated with CT chest.       Assessment and Plan:   Gerson Yao is a 62 y.o.  female  who presents with abdominal pain    Abdominal pain  Likely large bowel obstruction  CT scan with abrupt narrowing involving the splenic flexure/distal transverse colon with mucosal thickening and pericolonic stranding, caused bowel obstruction  GI evaluated, s/p colonoscopy. Not able to pass scope beyond stricture, recommended surgical resection-need outpatient follow-up for repeat C scope in 6 months. Planned surgery on Monday. While holding DAPT-started on Integrilin by cardiology. Preop management as per surgery. H/o amphetamine use    CAD s/p PCI  Holding ASA, plavix  Continue BB    Hypothyroidism  Continue Synthroid. Hepatitis C  HLD  COPD     HTN-continue Norvasc, Coreg.   BP stable  Nicotine abuse    Diet DIET CLEAR LIQUID;  Diet NPO Time Specified   DVT Prophylaxis [] Lovenox, []  Heparin, [x] SCDs, [] Ambulation   GI Prophylaxis [x] PPI,  [] H2 Blocker,  [] Carafate,  [] Diet/Tube Feeds   Code Status Full Code   Disposition Patient requires continued admission due to abdominal pain   MDM [] Low, [x] Moderate,[]  High     Electronically signed by Kamar Bustamante MD on 8/15/2020 at 1:04 PM

## 2020-08-16 ENCOUNTER — ANESTHESIA EVENT (OUTPATIENT)
Dept: OPERATING ROOM | Age: 60
DRG: 329 | End: 2020-08-16
Payer: MEDICARE

## 2020-08-16 PROCEDURE — 2580000003 HC RX 258: Performed by: INTERNAL MEDICINE

## 2020-08-16 PROCEDURE — 6360000002 HC RX W HCPCS: Performed by: INTERNAL MEDICINE

## 2020-08-16 PROCEDURE — 6370000000 HC RX 637 (ALT 250 FOR IP): Performed by: INTERNAL MEDICINE

## 2020-08-16 PROCEDURE — 2500000003 HC RX 250 WO HCPCS: Performed by: INTERNAL MEDICINE

## 2020-08-16 PROCEDURE — 94761 N-INVAS EAR/PLS OXIMETRY MLT: CPT

## 2020-08-16 PROCEDURE — 6360000002 HC RX W HCPCS: Performed by: SURGERY

## 2020-08-16 PROCEDURE — 6370000000 HC RX 637 (ALT 250 FOR IP): Performed by: NURSE PRACTITIONER

## 2020-08-16 PROCEDURE — C9113 INJ PANTOPRAZOLE SODIUM, VIA: HCPCS | Performed by: INTERNAL MEDICINE

## 2020-08-16 PROCEDURE — 94640 AIRWAY INHALATION TREATMENT: CPT

## 2020-08-16 PROCEDURE — 99232 SBSQ HOSP IP/OBS MODERATE 35: CPT | Performed by: INTERNAL MEDICINE

## 2020-08-16 PROCEDURE — 6370000000 HC RX 637 (ALT 250 FOR IP): Performed by: SURGERY

## 2020-08-16 PROCEDURE — 2700000000 HC OXYGEN THERAPY PER DAY

## 2020-08-16 PROCEDURE — 2140000000 HC CCU INTERMEDIATE R&B

## 2020-08-16 RX ADMIN — GABAPENTIN 800 MG: 400 CAPSULE ORAL at 21:15

## 2020-08-16 RX ADMIN — CARVEDILOL 6.25 MG: 6.25 TABLET, FILM COATED ORAL at 17:20

## 2020-08-16 RX ADMIN — LEVOTHYROXINE SODIUM 100 MCG: 100 TABLET ORAL at 06:14

## 2020-08-16 RX ADMIN — EPTIFIBATIDE 2 MCG/KG/MIN: 75 INJECTION INTRAVENOUS at 10:40

## 2020-08-16 RX ADMIN — ESCITALOPRAM OXALATE 20 MG: 10 TABLET ORAL at 08:51

## 2020-08-16 RX ADMIN — SODIUM CHLORIDE, PRESERVATIVE FREE 10 ML: 5 INJECTION INTRAVENOUS at 08:53

## 2020-08-16 RX ADMIN — ONDANSETRON 4 MG: 2 INJECTION INTRAMUSCULAR; INTRAVENOUS at 06:14

## 2020-08-16 RX ADMIN — CEFTRIAXONE 1 G: 1 INJECTION, POWDER, FOR SOLUTION INTRAMUSCULAR; INTRAVENOUS at 06:12

## 2020-08-16 RX ADMIN — NEOMYCIN SULFATE 1000 MG: 500 TABLET ORAL at 16:25

## 2020-08-16 RX ADMIN — SODIUM CHLORIDE, PRESERVATIVE FREE 10 ML: 5 INJECTION INTRAVENOUS at 08:52

## 2020-08-16 RX ADMIN — NEOMYCIN SULFATE 1000 MG: 500 TABLET ORAL at 23:35

## 2020-08-16 RX ADMIN — LORAZEPAM 1 MG: 2 INJECTION INTRAMUSCULAR; INTRAVENOUS at 14:47

## 2020-08-16 RX ADMIN — PANTOPRAZOLE SODIUM 40 MG: 40 INJECTION, POWDER, FOR SOLUTION INTRAVENOUS at 08:51

## 2020-08-16 RX ADMIN — MORPHINE SULFATE 4 MG: 4 INJECTION, SOLUTION INTRAMUSCULAR; INTRAVENOUS at 17:20

## 2020-08-16 RX ADMIN — GABAPENTIN 800 MG: 400 CAPSULE ORAL at 08:51

## 2020-08-16 RX ADMIN — MORPHINE SULFATE 4 MG: 4 INJECTION, SOLUTION INTRAMUSCULAR; INTRAVENOUS at 13:02

## 2020-08-16 RX ADMIN — SODIUM CHLORIDE, POTASSIUM CHLORIDE, SODIUM LACTATE AND CALCIUM CHLORIDE: 600; 310; 30; 20 INJECTION, SOLUTION INTRAVENOUS at 10:48

## 2020-08-16 RX ADMIN — NEOMYCIN SULFATE 1000 MG: 500 TABLET ORAL at 06:20

## 2020-08-16 RX ADMIN — GABAPENTIN 800 MG: 400 CAPSULE ORAL at 14:47

## 2020-08-16 RX ADMIN — CARVEDILOL 6.25 MG: 6.25 TABLET, FILM COATED ORAL at 08:51

## 2020-08-16 RX ADMIN — LORAZEPAM 1 MG: 2 INJECTION INTRAMUSCULAR; INTRAVENOUS at 08:51

## 2020-08-16 RX ADMIN — MORPHINE SULFATE 4 MG: 4 INJECTION, SOLUTION INTRAMUSCULAR; INTRAVENOUS at 04:04

## 2020-08-16 RX ADMIN — ONDANSETRON 4 MG: 2 INJECTION INTRAMUSCULAR; INTRAVENOUS at 14:47

## 2020-08-16 RX ADMIN — METOCLOPRAMIDE 10 MG: 5 INJECTION, SOLUTION INTRAMUSCULAR; INTRAVENOUS at 06:14

## 2020-08-16 RX ADMIN — BUDESONIDE AND FORMOTEROL FUMARATE DIHYDRATE 2 PUFF: 160; 4.5 AEROSOL RESPIRATORY (INHALATION) at 11:47

## 2020-08-16 RX ADMIN — AMLODIPINE BESYLATE 5 MG: 5 TABLET ORAL at 08:51

## 2020-08-16 RX ADMIN — SODIUM CHLORIDE, PRESERVATIVE FREE 10 ML: 5 INJECTION INTRAVENOUS at 21:16

## 2020-08-16 RX ADMIN — MORPHINE SULFATE 4 MG: 4 INJECTION, SOLUTION INTRAMUSCULAR; INTRAVENOUS at 21:15

## 2020-08-16 RX ADMIN — EPTIFIBATIDE 2 MCG/KG/MIN: 75 INJECTION INTRAVENOUS at 00:10

## 2020-08-16 RX ADMIN — SODIUM CHLORIDE, POTASSIUM CHLORIDE, SODIUM LACTATE AND CALCIUM CHLORIDE: 600; 310; 30; 20 INJECTION, SOLUTION INTRAVENOUS at 00:01

## 2020-08-16 RX ADMIN — SODIUM CHLORIDE, POTASSIUM CHLORIDE, SODIUM LACTATE AND CALCIUM CHLORIDE: 600; 310; 30; 20 INJECTION, SOLUTION INTRAVENOUS at 21:24

## 2020-08-16 RX ADMIN — METRONIDAZOLE 500 MG: 500 INJECTION, SOLUTION INTRAVENOUS at 14:47

## 2020-08-16 RX ADMIN — POLYETHYLENE GLYCOL 3350 17 G: 17 POWDER, FOR SOLUTION ORAL at 08:52

## 2020-08-16 RX ADMIN — METOCLOPRAMIDE 10 MG: 5 INJECTION, SOLUTION INTRAMUSCULAR; INTRAVENOUS at 14:47

## 2020-08-16 RX ADMIN — SODIUM CHLORIDE, PRESERVATIVE FREE 10 ML: 5 INJECTION INTRAVENOUS at 23:35

## 2020-08-16 RX ADMIN — LORAZEPAM 1 MG: 2 INJECTION INTRAMUSCULAR; INTRAVENOUS at 02:07

## 2020-08-16 RX ADMIN — METRONIDAZOLE 500 MG: 500 INJECTION, SOLUTION INTRAVENOUS at 06:35

## 2020-08-16 RX ADMIN — EPTIFIBATIDE 2 MCG/KG/MIN: 75 INJECTION INTRAVENOUS at 21:21

## 2020-08-16 RX ADMIN — ONDANSETRON 4 MG: 2 INJECTION INTRAMUSCULAR; INTRAVENOUS at 23:35

## 2020-08-16 RX ADMIN — METOCLOPRAMIDE 10 MG: 5 INJECTION, SOLUTION INTRAMUSCULAR; INTRAVENOUS at 23:35

## 2020-08-16 RX ADMIN — LORAZEPAM 1 MG: 2 INJECTION INTRAMUSCULAR; INTRAVENOUS at 21:16

## 2020-08-16 RX ADMIN — METRONIDAZOLE 500 MG: 500 INJECTION, SOLUTION INTRAVENOUS at 23:34

## 2020-08-16 RX ADMIN — MORPHINE SULFATE 4 MG: 4 INJECTION, SOLUTION INTRAMUSCULAR; INTRAVENOUS at 08:51

## 2020-08-16 ASSESSMENT — PAIN DESCRIPTION - PROGRESSION

## 2020-08-16 ASSESSMENT — PAIN - FUNCTIONAL ASSESSMENT: PAIN_FUNCTIONAL_ASSESSMENT: ACTIVITIES ARE NOT PREVENTED

## 2020-08-16 ASSESSMENT — PAIN DESCRIPTION - DESCRIPTORS
DESCRIPTORS: ACHING
DESCRIPTORS: ACHING

## 2020-08-16 ASSESSMENT — PAIN SCALES - GENERAL
PAINLEVEL_OUTOF10: 7
PAINLEVEL_OUTOF10: 0
PAINLEVEL_OUTOF10: 0
PAINLEVEL_OUTOF10: 9
PAINLEVEL_OUTOF10: 10

## 2020-08-16 ASSESSMENT — PAIN DESCRIPTION - PAIN TYPE
TYPE: ACUTE PAIN
TYPE: ACUTE PAIN

## 2020-08-16 ASSESSMENT — PAIN DESCRIPTION - LOCATION
LOCATION: ABDOMEN
LOCATION: ABDOMEN

## 2020-08-16 ASSESSMENT — LIFESTYLE VARIABLES: SMOKING_STATUS: 1

## 2020-08-16 ASSESSMENT — PAIN DESCRIPTION - ORIENTATION: ORIENTATION: RIGHT;LOWER

## 2020-08-16 NOTE — PROGRESS NOTES
Hospitalist Progress Note      Name:  Collette Stank /Age/Sex: 1960  (62 y.o. female)   MRN & CSN:  [id number] & [id number] Admission Date/Time: 2020  8:42 PM   Location:  [facility] PCP: 62Annie Zuñiga Day: 6    Subjective     Patient reports pain is better today. No more bloody stools reported today.     Objective:     No intake or output data in the 24 hours ending 20 1154   Vitals:   Vitals:    20 0851   BP: (!) 121/104   Pulse:    Resp:    Temp:    SpO2:      Physical Exam:   General Appearance: alert and oriented to person, place and time, in no acute distress  Cardiovascular: normal rate, regular rhythm, normal S1 and S2  Pulmonary/Chest: clear to auscultation bilaterally- no wheezes, rales or rhonchi, normal air movement, no respiratory distress  Abdomen: soft, + generalized abdominal tenderness, BS +  Extremities: no cyanosis, clubbing or edema, pulse   Skin: warm and dry, no rash or erythema  Head: normocephalic and atraumatic  Eyes: pupils equal, round, and reactive to light  Neck: supple and non-tender without mass, no thyromegaly   Musculoskeletal: normal range of motion, no joint swelling, deformity or tenderness  Neurological: alert, oriented, normal speech, no focal findings or movement disorder noted    Medications:   Medications:    nicotine  1 patch Transdermal Daily    carvedilol  6.25 mg Oral BID WC    neomycin  1,000 mg Oral 3 times per day    polyethylene glycol  17 g Oral Daily    sodium chloride flush  10 mL Intravenous 2 times per day    amLODIPine  5 mg Oral Daily    escitalopram  20 mg Oral Daily    levothyroxine  100 mcg Oral Daily    budesonide-formoterol  2 puff Inhalation BID    gabapentin  800 mg Oral TID    cefTRIAXone (ROCEPHIN) IV  1 g Intravenous Q24H    metroNIDAZOLE  500 mg Intravenous Q8H    pantoprazole  40 mg Intravenous Daily    metoclopramide  10 mg Intravenous Q8H    ondansetron  4 mg Intravenous Q8H  sodium chloride flush  10 mL Intravenous BID      Infusions:    eptifibatide 2 mcg/kg/min (08/16/20 1040)    lactated ringers 100 mL/hr at 08/16/20 1048     PRN Meds: LORazepam, 1 mg, Q6H PRN  morphine, 4 mg, Q4H PRN  hydrALAZINE (APRESOLINE) ivpb, 10 mg, Q4H PRN  sodium chloride flush, 10 mL, PRN  acetaminophen, 650 mg, Q6H PRN    Or  acetaminophen, 650 mg, Q6H PRN  magnesium hydroxide, 30 mL, Daily PRN  promethazine, 12.5 mg, Q6H PRN            Pertinent New Labs & Imaging Studies     CBC with Differential:    Lab Results   Component Value Date    WBC 10.8 08/12/2020    RBC 4.60 08/12/2020    HGB 12.6 08/15/2020    HCT 39.4 08/15/2020     08/12/2020    MCV 92.6 08/12/2020    MCH 29.6 08/12/2020    MCHC 31.9 08/12/2020    RDW 12.6 08/12/2020    SEGSPCT 83.0 08/12/2020    BANDSPCT 28 05/06/2020    LYMPHOPCT 10.6 08/12/2020    MONOPCT 5.5 08/12/2020    BASOPCT 0.4 08/12/2020    MONOSABS 0.6 08/12/2020    LYMPHSABS 1.2 08/12/2020    EOSABS 0.0 08/12/2020    BASOSABS 0.0 08/12/2020    DIFFTYPE AUTOMATED DIFFERENTIAL 08/12/2020     CMP:    Lab Results   Component Value Date     08/12/2020    K 4.1 08/12/2020     08/12/2020    CO2 22 08/12/2020    BUN 10 08/12/2020    CREATININE 0.5 08/12/2020    GFRAA >60 08/12/2020    LABGLOM >60 08/12/2020    GLUCOSE 126 08/12/2020    PROT 6.2 08/12/2020    LABALBU 2.8 08/12/2020    CALCIUM 8.2 08/12/2020    BILITOT 0.2 08/12/2020    ALKPHOS 95 08/12/2020    AST 40 08/12/2020    ALT 30 08/12/2020     Ct Abdomen Pelvis W Iv Contrast Additional Contrast? None    Result Date: 8/12/2020  EXAMINATION: CT OF THE ABDOMEN AND PELVIS WITH CONTRAST 8/11/2020 10:17 pm TECHNIQUE: CT of the abdomen and pelvis was performed with the administration of intravenous contrast. Multiplanar reformatted images are provided for review.  Dose modulation, iterative reconstruction, and/or weight based adjustment of the mA/kV was utilized to reduce the radiation dose to as low as

## 2020-08-16 NOTE — PLAN OF CARE
Problem: Pain:  Goal: Pain level will decrease  Description: Pain level will decrease  Outcome: Ongoing  Goal: Control of acute pain  Description: Control of acute pain  Outcome: Ongoing     Problem: Sensory:  Goal: Pain level will decrease  Description: Pain level will decrease  Outcome: Ongoing

## 2020-08-16 NOTE — PROGRESS NOTES
Cardiology Progress Note     Admit Date:  8/11/2020    Consult reason/ Seen today for :   ascvd  Post stent    Subjective and  Overnight Events :  Plan for surgery on monday      Chief complain on admission : 62 y. o.year old who is admitted for  Chief Complaint   Patient presents with    Rectal Bleeding     on plavix, hx of hemrrhoids, states \"for a couple months\"     Constipation     states been 1 week    Abdominal Pain     \"all over pain\", started today     Hypotension     92/68 upon arrival       Assessment / Plan:  ASCVD: s/p PCI in may 2020  , now needs surgery hold dapt but restart after surgery   Hold plavix and aspirin  aggrastat till post surgery, can hold it for 6 hrs before surgery   Increase coreg   DVT Prophylaxis if no contraindication    Past medical history:    has a past medical history of Amphetamine abuse ([facility] [address] Cocaine abuse ([facility] [address] and NSTEMI (non-ST elevated myocardial infarction) ([facility] [address] Past surgical history:   has a past surgical history that includes sigmoidoscopy (N/A, 8/13/2020). Social History:   reports that she has been smoking. She has never used smokeless tobacco. She reports current drug use. Drugs: Cocaine, Marijuana, Opiates , and Methamphetamines. She reports that she does not drink alcohol. Family history:  family history is not on file. No Known Allergies    Review of Systems:    All 14 systems were reviewed and are negative  Except for the positive findings  which as documented     BP (!) 121/104   Pulse 60   Temp 98.1 °F (36.7 °C) (Oral)   Resp 20   Ht 5' 2\" (1.575 m)   Wt 152 lb 12.5 oz (69.3 kg)   SpO2 92%   BMI 27.94 kg/m²     No intake or output data in the 24 hours ending 08/16/20 1357  Physical Exam:  Constitutional:  Well developed, Well nourished, No acute distress, Non-toxic appearance.    HENT:  Normocephalic, Atraumatic, Bilateral external ears normal, Oropharynx moist, No oral exudates, Nose normal. Neck- Normal range of motion, No tenderness, Supple, No stridor. Eyes:  PERRL, EOMI, Conjunctiva normal, No discharge. Respiratory:  Normal breath sounds, No respiratory distress, No wheezing, No chest tenderness. Cardiovascular:  Normal heart rate, Normal rhythm, No murmurs, No rubs, No gallops, JVP not elevated  Abdomen/GI:  Bowel sounds normal, Soft, No tenderness, No masses, No pulsatile masses. Musculoskeletal:  Intact distal pulses, No edema, No tenderness, No cyanosis, No clubbing. Good range of motion in all major joints. No tenderness to palpation or major deformities noted. Back- No tenderness. Integument:  Warm, Dry, No erythema, No rash. Lymphatic:  No lymphadenopathy noted. Neurologic:  Alert & oriented x 3, Normal motor function, Normal sensory function, No focal deficits noted.    Psychiatric:  Affect  and  Mood :no change    Medications:    nicotine  1 patch Transdermal Daily    carvedilol  6.25 mg Oral BID WC    neomycin  1,000 mg Oral 3 times per day    polyethylene glycol  17 g Oral Daily    sodium chloride flush  10 mL Intravenous 2 times per day    amLODIPine  5 mg Oral Daily    escitalopram  20 mg Oral Daily    levothyroxine  100 mcg Oral Daily    budesonide-formoterol  2 puff Inhalation BID    gabapentin  800 mg Oral TID    cefTRIAXone (ROCEPHIN) IV  1 g Intravenous Q24H    metroNIDAZOLE  500 mg Intravenous Q8H    pantoprazole  40 mg Intravenous Daily    metoclopramide  10 mg Intravenous Q8H    ondansetron  4 mg Intravenous Q8H    sodium chloride flush  10 mL Intravenous BID      eptifibatide 2 mcg/kg/min (08/16/20 1040)    lactated ringers 100 mL/hr at 08/16/20 1048     LORazepam, morphine, hydrALAZINE (APRESOLINE) ivpb, sodium chloride flush, acetaminophen **OR** acetaminophen, magnesium hydroxide, promethazine **OR** [DISCONTINUED] ondansetron    Lab Data:  CBC:   Recent Labs     08/15/20  0406   HGB 12.6   HCT 39.4     BMP: No results for input(s): NA, K, CL, CO2, PHOS, BUN, CREATININE in the last 72 hours. Invalid input(s): CA  PT/INR: No results for input(s): PROTIME, INR in the last 72 hours. BNP:  No results for input(s): PROBNP in the last 72 hours. TROPONIN: No results for input(s): TROPONINT in the last 72 hours. ECHO :   echocardiogram    Assessment:  62 y. o.year old who is admitted for  Chief Complaint   Patient presents with    Rectal Bleeding     on plavix, hx of hemrrhoids, states \"for a couple months\"     Constipation     states been 1 week    Abdominal Pain     \"all over pain\", started today     Hypotension     92/68 upon arrival     , active issues as noted below:  Impression:  Active Problems:    Acute abdominal pain  Resolved Problems:    * No resolved hospital problems. *            All labs, medications and tests reviewed by myself , continue all other medications of all above medical condition listed as is except for changes mentioned above. Thank you very much for consult , please call with questions.     Bernice Alvarado MD 8/16/2020 1:57 PM

## 2020-08-16 NOTE — ANESTHESIA PRE PROCEDURE
Department of Anesthesiology  Preprocedure Note       Name:  Bonnie Ho   Age:  62 y.o.  :  1960                                          MRN:  [id number]         Date:  2020      Surgeon: Pavithra Herrera):  Luis Fernando Alford MD    Procedure: Procedure(s):  LEFT BOWEL RESECTION HEMICOLECTOMY, PRIMARY ANASTOMOSIS POSSIBLE LOOP ILEOSTOMY    Medications prior to admission:   Prior to Admission medications    Medication Sig Start Date End Date Taking? Authorizing Provider   cephALEXin (KEFLEX) 500 MG capsule Take 1 capsule by mouth 4 times daily for 10 days 20  LIZETTE Pinzon   sulfamethoxazole-trimethoprim (BACTRIM DS) 800-160 MG per tablet Take 1 tablet by mouth 2 times daily for 10 days 20  LIZETTE Pinzon   amLODIPine (NORVASC) 5 MG tablet Take 1 tablet by mouth daily 20   Kirby Sullivan MD   traZODone (DESYREL) 50 MG tablet Take 50 mg by mouth nightly    Historical Provider, MD   mometasone-formoterol (DULERA) 100-5 MCG/ACT inhaler Inhale 2 puffs into the lungs 2 times daily    Historical Provider, MD   fluticasone (FLONASE) 50 MCG/ACT nasal spray 2 sprays by Each Nostril route daily    Historical Provider, MD   levothyroxine (SYNTHROID) 100 MCG tablet Take 100 mcg by mouth Daily    Historical Provider, MD   gabapentin (NEURONTIN) 800 MG tablet Take 800 mg by mouth 3 times daily.     Historical Provider, MD   promethazine (PHENERGAN) 25 MG tablet Take 25 mg by mouth every 6 hours as needed for Nausea    Historical Provider, MD   albuterol sulfate HFA (VENTOLIN HFA) 108 (90 Base) MCG/ACT inhaler Inhale 1 puff into the lungs every 6 hours as needed for Wheezing    Historical Provider, MD   escitalopram (LEXAPRO) 20 MG tablet Take 20 mg by mouth daily    Historical Provider, MD   docusate sodium (COLACE) 100 MG capsule Take 100 mg by mouth 2 times daily    Historical Provider, MD   atorvastatin (LIPITOR) 80 MG tablet Take 80 mg by mouth nightly    Historical Provider, MD aspirin 81 MG chewable tablet Take 81 mg by mouth daily    Historical Provider, MD   carvedilol (COREG) 3.125 MG tablet Take 3.125 mg by mouth 2 times daily (with meals)    Historical Provider, MD   traMADol (ULTRAM) 50 MG tablet Take 50 mg by mouth every 6 hours as needed for Pain.     Historical Provider, MD   clopidogrel (PLAVIX) 75 MG tablet Take 75 mg by mouth daily    Historical Provider, MD   fluticasone-salmeterol (ADVAIR) 250-50 MCG/DOSE AEPB Inhale 1 puff into the lungs 2 times daily    Historical Provider, MD       Current medications:    Current Facility-Administered Medications   Medication Dose Route Frequency Provider Last Rate Last Dose    nicotine (NICODERM CQ) 21 MG/24HR 1 patch  1 patch Transdermal Daily JORDIN Hogan CNP   1 patch at 08/16/20 0852    LORazepam (ATIVAN) injection 1 mg  1 mg Intravenous Q6H PRN MD Malia   1 mg at 08/16/20 1447    carvedilol (COREG) tablet 6.25 mg  6.25 mg Oral BID  Abad Infante MD   6.25 mg at 08/16/20 1720    eptifibatide (INTEGRILIN) 0.75 mg/mL infusion  2 mcg/kg/min Intravenous Continuous Abad Infante MD 11.3 mL/hr at 08/16/20 1040 2 mcg/kg/min at 08/16/20 1040    neomycin (MYCIFRADIN) tablet 1,000 mg  1,000 mg Oral 3 times per day Tato Melvin II, MD   1,000 mg at 08/16/20 1625    polyethylene glycol (GLYCOLAX) packet 17 g  17 g Oral Daily Tato Melvin II, MD   17 g at 08/16/20 0852    morphine sulfate (PF) injection 4 mg  4 mg Intravenous Q4H PRN Destini Gamble MD   4 mg at 08/16/20 1720    hydrALAZINE (APRESOLINE) 10 mg in sodium chloride 0.9 % 50 mL ivpb  10 mg Intravenous Q4H PRN JORDIN Perales - CNP   Stopped at 08/15/20 0635    sodium chloride flush 0.9 % injection 10 mL  10 mL Intravenous 2 times per day Tavo Gomez MD   10 mL at 08/16/20 0852    sodium chloride flush 0.9 % injection 10 mL  10 mL Intravenous PRN Tavo Gomez MD   10 mL at 08/14/20 0540    acetaminophen (TYLENOL) tablet 650 mg  650 mg Oral Q6H PRN Keiry Kumar MD        Or    acetaminophen (TYLENOL) suppository 650 mg  650 mg Rectal Q6H PRN Keiry Kumar MD        magnesium hydroxide (MILK OF MAGNESIA) 400 MG/5ML suspension 30 mL  30 mL Oral Daily PRN Keiry Kumar MD        promethazine (PHENERGAN) tablet 12.5 mg  12.5 mg Oral Q6H PRN Keiry Kumar MD   12.5 mg at 08/12/20 0142    lactated ringers infusion   Intravenous Continuous Keiry Kumar  mL/hr at 08/16/20 1048      amLODIPine (NORVASC) tablet 5 mg  5 mg Oral Daily Rosario Balderas MD   5 mg at 08/16/20 0851    escitalopram (LEXAPRO) tablet 20 mg  20 mg Oral Daily Keiry Kumar MD   20 mg at 08/16/20 0851    levothyroxine (SYNTHROID) tablet 100 mcg  100 mcg Oral Daily Rosario Balderas MD   100 mcg at 08/16/20 0614    budesonide-formoterol (SYMBICORT) 160-4.5 MCG/ACT inhaler 2 puff  2 puff Inhalation BID Keiry Kumar MD   2 puff at 08/16/20 1147    gabapentin (NEURONTIN) capsule 800 mg  800 mg Oral TID Keiry Kumar MD   800 mg at 08/16/20 1447    cefTRIAXone (ROCEPHIN) 1 g IVPB in 50 mL D5W minibag  1 g Intravenous Q24H Keiry Kumar MD   Stopped at 08/16/20 0635    metronidazole (FLAGYL) 500 mg in NaCl 100 mL IVPB premix  500 mg Intravenous Q8H Keiry Kumar MD   Stopped at 08/16/20 1547    pantoprazole (PROTONIX) injection 40 mg  40 mg Intravenous Daily Rosario Balderas MD   40 mg at 08/16/20 0851    metoclopramide (REGLAN) injection 10 mg  10 mg Intravenous Q8H Rosario Balderas MD   10 mg at 08/16/20 1447    ondansetron (ZOFRAN) injection 4 mg  4 mg Intravenous Q8H Rosario Balderas MD   4 mg at 08/16/20 1447    sodium chloride flush 0.9 % injection 10 mL  10 mL Intravenous BID Keiry Kumar MD   10 mL at 08/16/20 0853       Allergies:  No Known Allergies    Problem List:    Patient Active Problem List   Diagnosis Code    Colitis K52.9    Acute abdominal pain R10.9       Past Medical History:        Diagnosis Date    Amphetamine abuse ([facility] [address] 2020    Cocaine abuse ([facility] [address] 2020  NSTEMI (non-ST elevated myocardial infarction) ([facility] [address] 04/2020       Past Surgical History:        Procedure Laterality Date    SIGMOIDOSCOPY N/A 8/13/2020    SIGMOIDOSCOPY BIOPSY FLEXIBLE performed by Barbara Torres MD at [address] [place] [address] History:    Social History     Tobacco Use    Smoking status: Current Every Day Smoker    Smokeless tobacco: Never Used   Substance Use Topics    Alcohol use: Never     Frequency: Never                                Ready to quit: Not Answered  Counseling given: Not Answered      Vital Signs (Current):   Vitals:    08/16/20 0405 08/16/20 0851 08/16/20 1305 08/16/20 1720   BP: (!) 157/84 (!) 121/104 (!) 165/92 (!) 192/109   Pulse: 60  73 63   Resp: 20  15 18   Temp: 98.1 °F (36.7 °C)  97.8 °F (36.6 °C) 98 °F (36.7 °C)   TempSrc: Oral  Oral Oral   SpO2: 92%      Weight: 152 lb 12.5 oz (69.3 kg)      Height:                                                  BP Readings from Last 3 Encounters:   08/16/20 (!) 192/109   08/13/20 137/60   08/07/20 (!) 170/91       NPO Status: Time of last liquid consumption: 0000                        Time of last solid consumption: 0000                        Date of last liquid consumption: 08/11/20                        Date of last solid food consumption: 08/10/20    BMI:   Wt Readings from Last 3 Encounters:   08/16/20 152 lb 12.5 oz (69.3 kg)   08/07/20 151 lb (68.5 kg)   05/08/20 183 lb 1.6 oz (83.1 kg)     Body mass index is 27.94 kg/m².     CBC:   Lab Results   Component Value Date    WBC 10.8 08/12/2020    RBC 4.60 08/12/2020    HGB 12.6 08/15/2020    HCT 39.4 08/15/2020    MCV 92.6 08/12/2020    RDW 12.6 08/12/2020     08/12/2020       CMP:   Lab Results   Component Value Date     08/12/2020    K 4.1 08/12/2020     08/12/2020    CO2 22 08/12/2020    BUN 10 08/12/2020    CREATININE 0.5 08/12/2020    GFRAA >60 08/12/2020    LABGLOM >60 08/12/2020    GLUCOSE 126 08/12/2020    PROT 6.2 08/12/2020    CALCIUM 8.2

## 2020-08-17 ENCOUNTER — ANESTHESIA (OUTPATIENT)
Dept: OPERATING ROOM | Age: 60
DRG: 329 | End: 2020-08-17
Payer: MEDICARE

## 2020-08-17 LAB
ABO/RH: NORMAL
ANTIBODY SCREEN: NEGATIVE

## 2020-08-17 PROCEDURE — 6370000000 HC RX 637 (ALT 250 FOR IP): Performed by: NURSE PRACTITIONER

## 2020-08-17 PROCEDURE — 6360000002 HC RX W HCPCS: Performed by: INTERNAL MEDICINE

## 2020-08-17 PROCEDURE — 6370000000 HC RX 637 (ALT 250 FOR IP): Performed by: INTERNAL MEDICINE

## 2020-08-17 PROCEDURE — 86850 RBC ANTIBODY SCREEN: CPT

## 2020-08-17 PROCEDURE — APPSS60 APP SPLIT SHARED TIME 46-60 MINUTES: Performed by: NURSE PRACTITIONER

## 2020-08-17 PROCEDURE — 2580000003 HC RX 258: Performed by: INTERNAL MEDICINE

## 2020-08-17 PROCEDURE — C1751 CATH, INF, PER/CENT/MIDLINE: HCPCS

## 2020-08-17 PROCEDURE — 2500000003 HC RX 250 WO HCPCS: Performed by: INTERNAL MEDICINE

## 2020-08-17 PROCEDURE — 05HF33Z INSERTION OF INFUSION DEVICE INTO LEFT CEPHALIC VEIN, PERCUTANEOUS APPROACH: ICD-10-PCS | Performed by: INTERNAL MEDICINE

## 2020-08-17 PROCEDURE — 94640 AIRWAY INHALATION TREATMENT: CPT

## 2020-08-17 PROCEDURE — 6370000000 HC RX 637 (ALT 250 FOR IP): Performed by: SURGERY

## 2020-08-17 PROCEDURE — 86901 BLOOD TYPING SEROLOGIC RH(D): CPT

## 2020-08-17 PROCEDURE — 99231 SBSQ HOSP IP/OBS SF/LOW 25: CPT | Performed by: INTERNAL MEDICINE

## 2020-08-17 PROCEDURE — 2140000000 HC CCU INTERMEDIATE R&B

## 2020-08-17 PROCEDURE — 36410 VNPNXR 3YR/> PHY/QHP DX/THER: CPT

## 2020-08-17 PROCEDURE — C9113 INJ PANTOPRAZOLE SODIUM, VIA: HCPCS | Performed by: INTERNAL MEDICINE

## 2020-08-17 PROCEDURE — 76937 US GUIDE VASCULAR ACCESS: CPT

## 2020-08-17 PROCEDURE — 6360000002 HC RX W HCPCS: Performed by: SURGERY

## 2020-08-17 PROCEDURE — 86900 BLOOD TYPING SEROLOGIC ABO: CPT

## 2020-08-17 PROCEDURE — 99232 SBSQ HOSP IP/OBS MODERATE 35: CPT | Performed by: SURGERY

## 2020-08-17 RX ORDER — AMLODIPINE BESYLATE 5 MG/1
5 TABLET ORAL ONCE
Status: COMPLETED | OUTPATIENT
Start: 2020-08-17 | End: 2020-08-17

## 2020-08-17 RX ORDER — AMLODIPINE BESYLATE 10 MG/1
10 TABLET ORAL DAILY
Status: DISCONTINUED | OUTPATIENT
Start: 2020-08-18 | End: 2020-08-24 | Stop reason: HOSPADM

## 2020-08-17 RX ORDER — CARVEDILOL 12.5 MG/1
12.5 TABLET ORAL 2 TIMES DAILY WITH MEALS
Status: DISCONTINUED | OUTPATIENT
Start: 2020-08-17 | End: 2020-08-19

## 2020-08-17 RX ORDER — EPTIFIBATIDE 0.75 MG/ML
2 INJECTION, SOLUTION INTRAVENOUS CONTINUOUS
Status: DISPENSED | OUTPATIENT
Start: 2020-08-17 | End: 2020-08-18

## 2020-08-17 RX ADMIN — CARVEDILOL 6.25 MG: 6.25 TABLET, FILM COATED ORAL at 09:51

## 2020-08-17 RX ADMIN — MORPHINE SULFATE 4 MG: 4 INJECTION, SOLUTION INTRAMUSCULAR; INTRAVENOUS at 01:47

## 2020-08-17 RX ADMIN — ONDANSETRON 4 MG: 2 INJECTION INTRAMUSCULAR; INTRAVENOUS at 06:01

## 2020-08-17 RX ADMIN — METOCLOPRAMIDE 10 MG: 5 INJECTION, SOLUTION INTRAMUSCULAR; INTRAVENOUS at 06:01

## 2020-08-17 RX ADMIN — METOCLOPRAMIDE 10 MG: 5 INJECTION, SOLUTION INTRAMUSCULAR; INTRAVENOUS at 22:07

## 2020-08-17 RX ADMIN — SODIUM CHLORIDE, PRESERVATIVE FREE 10 ML: 5 INJECTION INTRAVENOUS at 20:58

## 2020-08-17 RX ADMIN — MORPHINE SULFATE 4 MG: 4 INJECTION, SOLUTION INTRAMUSCULAR; INTRAVENOUS at 06:01

## 2020-08-17 RX ADMIN — METOCLOPRAMIDE 10 MG: 5 INJECTION, SOLUTION INTRAMUSCULAR; INTRAVENOUS at 14:52

## 2020-08-17 RX ADMIN — SODIUM CHLORIDE, PRESERVATIVE FREE 10 ML: 5 INJECTION INTRAVENOUS at 04:02

## 2020-08-17 RX ADMIN — MORPHINE SULFATE 4 MG: 4 INJECTION, SOLUTION INTRAMUSCULAR; INTRAVENOUS at 10:07

## 2020-08-17 RX ADMIN — LEVOTHYROXINE SODIUM 100 MCG: 100 TABLET ORAL at 06:01

## 2020-08-17 RX ADMIN — LORAZEPAM 1 MG: 2 INJECTION INTRAMUSCULAR; INTRAVENOUS at 04:02

## 2020-08-17 RX ADMIN — EPTIFIBATIDE 2 MCG/KG/MIN: 75 INJECTION INTRAVENOUS at 22:08

## 2020-08-17 RX ADMIN — LORAZEPAM 1 MG: 2 INJECTION INTRAMUSCULAR; INTRAVENOUS at 10:07

## 2020-08-17 RX ADMIN — AMLODIPINE BESYLATE 5 MG: 5 TABLET ORAL at 13:19

## 2020-08-17 RX ADMIN — CEFTRIAXONE 1 G: 1 INJECTION, POWDER, FOR SOLUTION INTRAMUSCULAR; INTRAVENOUS at 05:59

## 2020-08-17 RX ADMIN — METRONIDAZOLE 500 MG: 500 INJECTION, SOLUTION INTRAVENOUS at 20:57

## 2020-08-17 RX ADMIN — LORAZEPAM 1 MG: 2 INJECTION INTRAMUSCULAR; INTRAVENOUS at 22:08

## 2020-08-17 RX ADMIN — EPTIFIBATIDE 2 MCG/KG/MIN: 75 INJECTION INTRAVENOUS at 10:49

## 2020-08-17 RX ADMIN — POLYETHYLENE GLYCOL 3350, SODIUM SULFATE ANHYDROUS, SODIUM BICARBONATE, SODIUM CHLORIDE, POTASSIUM CHLORIDE 4000 ML: 236; 22.74; 6.74; 5.86; 2.97 POWDER, FOR SOLUTION ORAL at 14:52

## 2020-08-17 RX ADMIN — PROMETHAZINE HYDROCHLORIDE 12.5 MG: 25 TABLET ORAL at 10:07

## 2020-08-17 RX ADMIN — METRONIDAZOLE 500 MG: 500 INJECTION, SOLUTION INTRAVENOUS at 13:19

## 2020-08-17 RX ADMIN — CARVEDILOL 12.5 MG: 12.5 TABLET, FILM COATED ORAL at 16:08

## 2020-08-17 RX ADMIN — PANTOPRAZOLE SODIUM 40 MG: 40 INJECTION, POWDER, FOR SOLUTION INTRAVENOUS at 10:39

## 2020-08-17 RX ADMIN — ONDANSETRON 4 MG: 2 INJECTION INTRAMUSCULAR; INTRAVENOUS at 20:58

## 2020-08-17 RX ADMIN — ONDANSETRON 4 MG: 2 INJECTION INTRAMUSCULAR; INTRAVENOUS at 14:52

## 2020-08-17 RX ADMIN — MORPHINE SULFATE 4 MG: 4 INJECTION, SOLUTION INTRAMUSCULAR; INTRAVENOUS at 14:52

## 2020-08-17 RX ADMIN — GABAPENTIN 800 MG: 400 CAPSULE ORAL at 20:58

## 2020-08-17 RX ADMIN — ESCITALOPRAM OXALATE 20 MG: 10 TABLET ORAL at 09:52

## 2020-08-17 RX ADMIN — METRONIDAZOLE 500 MG: 500 INJECTION, SOLUTION INTRAVENOUS at 06:00

## 2020-08-17 RX ADMIN — BUDESONIDE AND FORMOTEROL FUMARATE DIHYDRATE 2 PUFF: 160; 4.5 AEROSOL RESPIRATORY (INHALATION) at 21:58

## 2020-08-17 RX ADMIN — AMLODIPINE BESYLATE 5 MG: 5 TABLET ORAL at 09:52

## 2020-08-17 RX ADMIN — LORAZEPAM 1 MG: 2 INJECTION INTRAMUSCULAR; INTRAVENOUS at 16:08

## 2020-08-17 RX ADMIN — GABAPENTIN 800 MG: 400 CAPSULE ORAL at 13:19

## 2020-08-17 RX ADMIN — BUDESONIDE AND FORMOTEROL FUMARATE DIHYDRATE 2 PUFF: 160; 4.5 AEROSOL RESPIRATORY (INHALATION) at 08:43

## 2020-08-17 RX ADMIN — MORPHINE SULFATE 4 MG: 4 INJECTION, SOLUTION INTRAMUSCULAR; INTRAVENOUS at 20:58

## 2020-08-17 RX ADMIN — GABAPENTIN 800 MG: 400 CAPSULE ORAL at 09:52

## 2020-08-17 ASSESSMENT — PAIN DESCRIPTION - ORIENTATION: ORIENTATION: RIGHT;LOWER

## 2020-08-17 ASSESSMENT — PAIN DESCRIPTION - PROGRESSION
CLINICAL_PROGRESSION: NOT CHANGED
CLINICAL_PROGRESSION: NOT CHANGED

## 2020-08-17 ASSESSMENT — ENCOUNTER SYMPTOMS
ALLERGIC/IMMUNOLOGIC NEGATIVE: 1
EYES NEGATIVE: 1
RESPIRATORY NEGATIVE: 1
ABDOMINAL PAIN: 1

## 2020-08-17 ASSESSMENT — PAIN SCALES - GENERAL
PAINLEVEL_OUTOF10: 10
PAINLEVEL_OUTOF10: 10
PAINLEVEL_OUTOF10: 0
PAINLEVEL_OUTOF10: 7
PAINLEVEL_OUTOF10: 10
PAINLEVEL_OUTOF10: 10
PAINLEVEL_OUTOF10: 7
PAINLEVEL_OUTOF10: 0

## 2020-08-17 ASSESSMENT — PAIN DESCRIPTION - FREQUENCY: FREQUENCY: CONTINUOUS

## 2020-08-17 ASSESSMENT — PAIN DESCRIPTION - DESCRIPTORS: DESCRIPTORS: ACHING

## 2020-08-17 ASSESSMENT — PAIN DESCRIPTION - PAIN TYPE: TYPE: ACUTE PAIN

## 2020-08-17 ASSESSMENT — PAIN DESCRIPTION - LOCATION: LOCATION: ABDOMEN

## 2020-08-17 ASSESSMENT — PAIN - FUNCTIONAL ASSESSMENT: PAIN_FUNCTIONAL_ASSESSMENT: ACTIVITIES ARE NOT PREVENTED

## 2020-08-17 ASSESSMENT — PAIN DESCRIPTION - ONSET: ONSET: PROGRESSIVE

## 2020-08-17 NOTE — CARE COORDINATION
Chart Reviewed:  Pt has a PCP and insurance to help with meds. Pt is independent of all her ADLs. Pt lives with family. Pt is + MJ,  CM placed a list of drug rehabs on her AVS.  No other needs identified. Please consult CM if the need should arise.

## 2020-08-17 NOTE — PROGRESS NOTES
97.9 °F (36.6 °C) (Oral)   Resp 15   Ht 5' 2\" (1.575 m)   Wt 152 lb 5.4 oz (69.1 kg)   SpO2 92%   BMI 27.86 kg/m²     No intake or output data in the 24 hours ending 08/17/20 1022     Physical Exam  Vitals signs and nursing note reviewed. Constitutional:       General: She is not in acute distress. Appearance: Normal appearance. She is not ill-appearing. HENT:      Head: Normocephalic and atraumatic. Eyes:      Pupils: Pupils are equal, round, and reactive to light. Neck:      Musculoskeletal: Neck supple. No muscular tenderness. Vascular: No carotid bruit. Cardiovascular:      Rate and Rhythm: Normal rate and regular rhythm. Pulses: Normal pulses. Heart sounds: Normal heart sounds. No murmur. Pulmonary:      Effort: Pulmonary effort is normal. No respiratory distress. Breath sounds: Normal breath sounds. Abdominal:      General: There is no distension. Tenderness: There is abdominal tenderness. Musculoskeletal:         General: No deformity. Right lower leg: Edema (trace) present. Left lower leg: Edema (trace) present. Skin:     General: Skin is warm and dry. Capillary Refill: Capillary refill takes less than 2 seconds. Neurological:      Mental Status: She is alert and oriented to person, place, and time. Psychiatric:         Mood and Affect: Mood normal.         Behavior: Behavior normal.         Thought Content:  Thought content normal.         Judgment: Judgment normal.         Medications:    polyethylene glycol  4,000 mL Oral Once    nicotine  1 patch Transdermal Daily    carvedilol  6.25 mg Oral BID WC    sodium chloride flush  10 mL Intravenous 2 times per day    amLODIPine  5 mg Oral Daily    escitalopram  20 mg Oral Daily    levothyroxine  100 mcg Oral Daily    budesonide-formoterol  2 puff Inhalation BID    gabapentin  800 mg Oral TID    cefTRIAXone (ROCEPHIN) IV  1 g Intravenous Q24H    metroNIDAZOLE  500 mg Intravenous Q8H  pantoprazole  40 mg Intravenous Daily    metoclopramide  10 mg Intravenous Q8H    ondansetron  4 mg Intravenous Q8H    sodium chloride flush  10 mL Intravenous BID      eptifibatide 2 mcg/kg/min (08/17/20 0953)    lactated ringers 100 mL/hr at 08/16/20 2124     LORazepam, morphine, hydrALAZINE (APRESOLINE) ivpb, sodium chloride flush, acetaminophen **OR** acetaminophen, magnesium hydroxide, promethazine **OR** [DISCONTINUED] ondansetron    Lab Data:  CBC:   Recent Labs     08/15/20  0406   HGB 12.6   HCT 39.4     BMP: No results for input(s): NA, K, CL, CO2, PHOS, BUN, CREATININE in the last 72 hours. Invalid input(s): CA  PT/INR: No results for input(s): PROTIME, INR in the last 72 hours. BNP:  No results for input(s): PROBNP in the last 72 hours. TROPONIN: No results for input(s): TROPONINT in the last 72 hours. ECHO :   Echocardiogram 4/29/2020 at [address] [place] [address] Way:    1. Left ventricle: The cavity size was normal. Wall thickness was     moderately increased. There was moderate concentric hypertrophy. Systolic function was normal. The estimated ejection fraction     was in the range of 55% to 60%. Wall motion was normal; there     were no regional wall motion abnormalities. Doppler parameters     are consistent with abnormal left ventricular relaxation (grade     1 diastolic dysfunction). 2. Right ventricle: The cavity size was normal. Wall thickness was     normal. Systolic function was normal.      Assessment:  62 y. o.year old who is admitted for  Chief Complaint   Patient presents with    Rectal Bleeding     on plavix, hx of hemrrhoids, states \"for a couple months\"     Constipation     states been 1 week    Abdominal Pain     \"all over pain\", started today     Hypotension     92/68 upon arrival     , active issues as noted below:    Impression:  Active Problems:    Acute abdominal pain  Resolved Problems:    * No resolved hospital problems.  *        All labs, medications and tests reviewed by myself , continue all other medications of all above medical condition listed as is except for changes mentioned above. Thank you very much for consult , please call with questions. Electronically signed by JORDIN Levin CNP on 8/17/2020 at 10:31 AM   CARDIOLOGY ATTENDING ADDENDUM    I have seen ,spoken to  and examined this patient personally, independently of the nurse practitioner. I have reviewed the hospital care given to date and reviewed all pertinent labs and imaging. The plan was developed mutually at the time of the visit with the patient,  NP   and myself. I have spoken with patient, nursing staff and provided written and verbal instructions . The above note has been reviewed and I agree with the assessment, diagnosis, and treatment plan with changes made by me as follows     HPI:  I have reviewed the above HPI  And agree with above   Travis Cassidy is a 62 y. o.year old who and presents with had concerns including Rectal Bleeding (on plavix, hx of hemrrhoids, states \"for a couple months\" ); Constipation (states been 1 week); Abdominal Pain (\"all over pain\", started today ); and Hypotension (92/68 upon arrival ).   Chief Complaint   Patient presents with    Rectal Bleeding     on plavix, hx of hemrrhoids, states \"for a couple months\"     Constipation     states been 1 week    Abdominal Pain     \"all over pain\", started today     Hypotension     92/68 upon arrival      Please review addendum/changes made to note above   Interval history:  No overnight issue s    Physical Exam:  General:  Awake, alert, NAD  Head:normal  Eye:normal  Neck:  No JVD   Chest:  Clear to auscultation, respiration easy  Cardiovascular:  S1 and S2 audible, No added heart sounds, No signs of ankle edema, or volume overload, No evidence of JVD, No crackles  Abdomen:   nontender  Extremities: no * edema  Pulses; palpable  Neuro: grossly normal      MEDICAL DECISION MAKING;    I agree with the above plan, which was planned by myself and discussed with NP.   Continue Aggrastat for now      Ramon Rosas MD [facility] 08/17/20

## 2020-08-17 NOTE — PROGRESS NOTES
Hospitalist Progress Note      Name:  Collette Stank /Age/Sex: 1960  (62 y.o. female)   MRN & CSN:  [id number] & [id number] Admission Date/Time: 2020  8:42 PM   Location:  3125/3125-A PCP: 62Annie Zuñiga Day: 7    Subjective     Patient is sleeping comfortably, did not wake her up. N.p.o. for planned surgery today.     Objective:     No intake or output data in the 24 hours ending 20 1712   Vitals:   Vitals:    20 1615   BP: (!) 159/91   Pulse: 64   Resp: 18   Temp: 98 °F (36.7 °C)   SpO2: 93%     Physical Exam:     General Appearance: Sleeping comfortably   Cardiovascular: normal rate, regular rhythm, normal S1 and S2  Pulmonary/Chest: CTA B/L  Abdomen: soft, + generalized abdominal tenderness, BS +  Extremities: no cyanosis, clubbing or edema, pulse   Skin: warm and dry, no rash or erythema  Musculoskeletal: normal range of motion, no joint swelling, deformity or tenderness  Neurological: Nonfocal exam.    Medications:   Medications:    carvedilol  12.5 mg Oral BID WC    [START ON 2020] amLODIPine  10 mg Oral Daily    nicotine  1 patch Transdermal Daily    sodium chloride flush  10 mL Intravenous 2 times per day    escitalopram  20 mg Oral Daily    levothyroxine  100 mcg Oral Daily    budesonide-formoterol  2 puff Inhalation BID    gabapentin  800 mg Oral TID    cefTRIAXone (ROCEPHIN) IV  1 g Intravenous Q24H    metroNIDAZOLE  500 mg Intravenous Q8H    pantoprazole  40 mg Intravenous Daily    metoclopramide  10 mg Intravenous Q8H    ondansetron  4 mg Intravenous Q8H    sodium chloride flush  10 mL Intravenous BID      Infusions:    eptifibatide 2 mcg/kg/min (20 1049)    lactated ringers 100 mL/hr at 20 2124     PRN Meds: LORazepam, 1 mg, Q6H PRN  morphine, 4 mg, Q4H PRN  hydrALAZINE (APRESOLINE) ivpb, 10 mg, Q4H PRN  sodium chloride flush, 10 mL, PRN  acetaminophen, 650 mg, Q6H PRN    Or  acetaminophen, 650 mg, Q6H been a cholecystectomy. The liver, spleen, adrenal glands, pancreas, and kidneys are unremarkable. GI/Bowel: The small bowel loops are within normal limits in caliber. The appendix is normal.  Mild colonic diverticulosis is seen. There is a focal area of abrupt narrowing involving the splenic flexure/distal transverse colon with adjacent pericolonic inflammatory changes. This results in large bowel obstruction with the transverse colon measuring 6.7 cm. This could represent focal area of stricture, acute infection, however neoplasm cannot be entirely excluded. No pericolonic lymph nodes are identified. This focal area of narrowing measures approximately 8 cm in length. Pelvis: The bladder is unremarkable. There is no free fluid. Peritoneum/Retroperitoneum: There is no free air or lymphadenopathy. Bones/Soft Tissues: No destructive osseous lesions are identified. 1. Focal area of abrupt narrowing involving the splenic flexure/distal transverse colon with mucosal thickening and pericolonic stranding measuring approximately 8 cm in length resulting in large bowel obstruction. The transverse colon proximally measures 6.7 cm. This focal area could represent a stricture, an area of acute on chronic colitis/diverticulitis, however neoplasm cannot be entirely excluded. Further evaluation with colonoscopy with surgical evaluation is recommended. 2. No evidence of free air. 3. Focal nodular opacities are noted within the right middle lobe of uncertain etiology. This should be further evaluated with CT chest.       Assessment and Plan:   Manjit Núñez is a 62 y.o.  female  who presents with abdominal pain    Abdominal pain  Likely large bowel obstruction  CT scan with abrupt narrowing involving the splenic flexure/distal transverse colon with mucosal thickening and pericolonic stranding, caused bowel obstruction  GI evaluated, s/p colonoscopy.  Not able to pass scope beyond stricture, recommended surgical

## 2020-08-17 NOTE — CONSULTS
Consult completed. Procedure/rationale explained to pt & consent obtained. #18ga 8cm-long Arrow Endurance MidLine placed to LUE Cephalic Vein using sterile, UltraSound-guided technique without difficulty/complications. Positioning verified via UltraSound visualization of line within lumen of vessel - site returns blood briskly and flushes without abnormalities/resistance. Sterile dressing with SkinPrep, BioPatch, SwabCap, and Limb Precautions band applied. Pt tolerated well & asks for her nurse for pain medication administration - Tramaine Aguillon, Primary RN notified.

## 2020-08-17 NOTE — FLOWSHEET NOTE
Dr. Carlos Miranda re-called this RN and gave new pre-op orders that were repeated back to doctor for clarification. Charge RN Jaylin Adamson RN updated on new orders.

## 2020-08-17 NOTE — PROGRESS NOTES
General Surgery-Dr. Mary Jefferson Day: 7    Chief Complaint on Admission: large bowel obstruction       Subjective:     No acute events. Pt did not get full bowel prep yesterday  Tolerating clears      ROS:  Review of Systems   Constitutional: Positive for fatigue. HENT: Negative. Eyes: Negative. Respiratory: Negative. Cardiovascular: Negative. Gastrointestinal: Positive for abdominal pain. Endocrine: Negative. Genitourinary: Negative. Musculoskeletal: Negative. Skin: Negative. Allergic/Immunologic: Negative. Neurological: Negative. Hematological: Negative. Psychiatric/Behavioral: Negative. Allergies  Patient has no known allergies. Diagnosis Date    [facility]     [facility] ([facility]     NSTEMI (non-ST elevated myocardial infarction) ([facility] 2020       Objective:     Vitals:    20 0404   BP: (!) 163/90   Pulse: 60   Resp: 15   Temp: 97.9 °F (36.6 °C)   SpO2:        TEMPERATURE:  Current -Temp: 97.9 °F (36.6 °C); Max - Temp  Av.9 °F (36.6 °C)  Min: 97.8 °F (36.6 °C)  Max: 98 °F (36.7 °C)    No intake/output data recorded. No intake/output data recorded. Physical Exam:  Physical Exam  Vitals signs reviewed. Constitutional:       General: She is not in acute distress. Appearance: Normal appearance. She is not ill-appearing, toxic-appearing or diaphoretic. HENT:      Head: Normocephalic and atraumatic. Right Ear: External ear normal.      Left Ear: External ear normal.   Eyes:      General:         Left eye: No discharge. Extraocular Movements: Extraocular movements intact. Cardiovascular:      Rate and Rhythm: Normal rate. Pulmonary:      Effort: No respiratory distress. Abdominal:      General: There is distension (mild). Palpations: Abdomen is soft. Tenderness: There is abdominal tenderness (min and less than yesterday). Musculoskeletal:         General: No swelling.    Skin: General: Skin is warm. Neurological:      General: No focal deficit present. Mental Status: She is alert. Psychiatric:         Mood and Affect: Mood normal.           Scheduled Meds:   polyethylene glycol  4,000 mL Oral Once    nicotine  1 patch Transdermal Daily    carvedilol  6.25 mg Oral BID WC    sodium chloride flush  10 mL Intravenous 2 times per day    amLODIPine  5 mg Oral Daily    escitalopram  20 mg Oral Daily    levothyroxine  100 mcg Oral Daily    budesonide-formoterol  2 puff Inhalation BID    gabapentin  800 mg Oral TID    cefTRIAXone (ROCEPHIN) IV  1 g Intravenous Q24H    metroNIDAZOLE  500 mg Intravenous Q8H    pantoprazole  40 mg Intravenous Daily    metoclopramide  10 mg Intravenous Q8H    ondansetron  4 mg Intravenous Q8H    sodium chloride flush  10 mL Intravenous BID     ContinuousInfusions:   eptifibatide      lactated ringers 100 mL/hr at 08/16/20 2124     PRN Meds:LORazepam, morphine, hydrALAZINE (APRESOLINE) ivpb, sodium chloride flush, acetaminophen **OR** acetaminophen, magnesium hydroxide, promethazine **OR** [DISCONTINUED] ondansetron      Labs/Imaging Results:   Lab Results   Component Value Date    WBC 10.8 (H) 08/12/2020    HGB 12.6 08/15/2020    HCT 39.4 08/15/2020    MCV 92.6 08/12/2020     08/12/2020     Lab Results   Component Value Date     08/12/2020    K 4.1 08/12/2020     08/12/2020    CO2 22 08/12/2020    BUN 10 08/12/2020    CREATININE 0.5 (L) 08/12/2020    GLUCOSE 126 (H) 08/12/2020    CALCIUM 8.2 (L) 08/12/2020    PROT 6.2 (L) 08/12/2020    LABALBU 2.8 (L) 08/12/2020    BILITOT 0.2 08/12/2020    ALKPHOS 95 08/12/2020    AST 40 (H) 08/12/2020    ALT 30 08/12/2020    LABGLOM >60 08/12/2020    GFRAA >60 08/12/2020       Assessment:     61 y/o F with stricture of proximal sigmoid colon    Plan:     -Pt did not receive full bowel prep yesterday. Still having formed stool. Needs repeat bowel prep.  Ordered and d/w pt and pt's nurse.    -Clears. NPO at midnight.    -Continue Integrilin. Stop tomorrow morning @ 6 am.     -Consent obtained.    -To OR tomorrow.          Electronically signed by Urbano Spaulding II, MD on 8/17/2020 at 8:37 AM

## 2020-08-18 VITALS
RESPIRATION RATE: 19 BRPM | SYSTOLIC BLOOD PRESSURE: 145 MMHG | DIASTOLIC BLOOD PRESSURE: 101 MMHG | TEMPERATURE: 95.5 F | OXYGEN SATURATION: 100 %

## 2020-08-18 PROBLEM — E43 SEVERE MALNUTRITION (HCC): Chronic | Status: ACTIVE | Noted: 2020-08-18

## 2020-08-18 LAB
CHOLESTEROL: 101 MG/DL
HDLC SERPL-MCNC: 44 MG/DL
LDL CHOLESTEROL DIRECT: 48 MG/DL
TRIGL SERPL-MCNC: 82 MG/DL

## 2020-08-18 PROCEDURE — 6360000002 HC RX W HCPCS: Performed by: INTERNAL MEDICINE

## 2020-08-18 PROCEDURE — 88342 IMHCHEM/IMCYTCHM 1ST ANTB: CPT | Performed by: PATHOLOGY

## 2020-08-18 PROCEDURE — 6360000002 HC RX W HCPCS: Performed by: ANESTHESIOLOGY

## 2020-08-18 PROCEDURE — 0DJD8ZZ INSPECTION OF LOWER INTESTINAL TRACT, VIA NATURAL OR ARTIFICIAL OPENING ENDOSCOPIC: ICD-10-PCS | Performed by: SURGERY

## 2020-08-18 PROCEDURE — 2140000000 HC CCU INTERMEDIATE R&B

## 2020-08-18 PROCEDURE — 7100000000 HC PACU RECOVERY - FIRST 15 MIN: Performed by: SURGERY

## 2020-08-18 PROCEDURE — 6360000002 HC RX W HCPCS: Performed by: NURSE ANESTHETIST, CERTIFIED REGISTERED

## 2020-08-18 PROCEDURE — 80061 LIPID PANEL: CPT

## 2020-08-18 PROCEDURE — 94761 N-INVAS EAR/PLS OXIMETRY MLT: CPT

## 2020-08-18 PROCEDURE — 2580000003 HC RX 258: Performed by: INTERNAL MEDICINE

## 2020-08-18 PROCEDURE — 44139 MOBILIZATION OF COLON: CPT | Performed by: SURGERY

## 2020-08-18 PROCEDURE — 99232 SBSQ HOSP IP/OBS MODERATE 35: CPT | Performed by: INTERNAL MEDICINE

## 2020-08-18 PROCEDURE — 2700000000 HC OXYGEN THERAPY PER DAY

## 2020-08-18 PROCEDURE — 44140 PARTIAL REMOVAL OF COLON: CPT | Performed by: SURGERY

## 2020-08-18 PROCEDURE — 7100000001 HC PACU RECOVERY - ADDTL 15 MIN: Performed by: SURGERY

## 2020-08-18 PROCEDURE — 3600000004 HC SURGERY LEVEL 4 BASE: Performed by: SURGERY

## 2020-08-18 PROCEDURE — 2709999900 HC NON-CHARGEABLE SUPPLY: Performed by: SURGERY

## 2020-08-18 PROCEDURE — 83721 ASSAY OF BLOOD LIPOPROTEIN: CPT

## 2020-08-18 PROCEDURE — 2720000010 HC SURG SUPPLY STERILE: Performed by: SURGERY

## 2020-08-18 PROCEDURE — 88307 TISSUE EXAM BY PATHOLOGIST: CPT | Performed by: PATHOLOGY

## 2020-08-18 PROCEDURE — 0DTG0ZZ RESECTION OF LEFT LARGE INTESTINE, OPEN APPROACH: ICD-10-PCS | Performed by: SURGERY

## 2020-08-18 PROCEDURE — 3700000000 HC ANESTHESIA ATTENDED CARE: Performed by: SURGERY

## 2020-08-18 PROCEDURE — 2500000003 HC RX 250 WO HCPCS: Performed by: INTERNAL MEDICINE

## 2020-08-18 PROCEDURE — 99232 SBSQ HOSP IP/OBS MODERATE 35: CPT | Performed by: SURGERY

## 2020-08-18 PROCEDURE — 2580000003 HC RX 258: Performed by: NURSE ANESTHETIST, CERTIFIED REGISTERED

## 2020-08-18 PROCEDURE — 3700000001 HC ADD 15 MINUTES (ANESTHESIA): Performed by: SURGERY

## 2020-08-18 PROCEDURE — 2500000003 HC RX 250 WO HCPCS: Performed by: NURSE ANESTHETIST, CERTIFIED REGISTERED

## 2020-08-18 PROCEDURE — 6370000000 HC RX 637 (ALT 250 FOR IP): Performed by: INTERNAL MEDICINE

## 2020-08-18 PROCEDURE — 2780000010 HC IMPLANT OTHER: Performed by: SURGERY

## 2020-08-18 PROCEDURE — 3600000014 HC SURGERY LEVEL 4 ADDTL 15MIN: Performed by: SURGERY

## 2020-08-18 DEVICE — AGENT HEMSTAT HUM FBRN SEAL EVICEL KT: Type: IMPLANTABLE DEVICE | Status: FUNCTIONAL

## 2020-08-18 RX ORDER — FENTANYL CITRATE 50 UG/ML
25 INJECTION, SOLUTION INTRAMUSCULAR; INTRAVENOUS EVERY 5 MIN PRN
Status: DISCONTINUED | OUTPATIENT
Start: 2020-08-18 | End: 2020-08-19 | Stop reason: HOSPADM

## 2020-08-18 RX ORDER — SODIUM CHLORIDE, SODIUM LACTATE, POTASSIUM CHLORIDE, CALCIUM CHLORIDE 600; 310; 30; 20 MG/100ML; MG/100ML; MG/100ML; MG/100ML
INJECTION, SOLUTION INTRAVENOUS CONTINUOUS PRN
Status: DISCONTINUED | OUTPATIENT
Start: 2020-08-18 | End: 2020-08-18 | Stop reason: SDUPTHER

## 2020-08-18 RX ORDER — GLYCOPYRROLATE 1 MG/5 ML
SYRINGE (ML) INTRAVENOUS PRN
Status: DISCONTINUED | OUTPATIENT
Start: 2020-08-18 | End: 2020-08-18 | Stop reason: SDUPTHER

## 2020-08-18 RX ORDER — FENTANYL CITRATE 50 UG/ML
50 INJECTION, SOLUTION INTRAMUSCULAR; INTRAVENOUS EVERY 5 MIN PRN
Status: DISCONTINUED | OUTPATIENT
Start: 2020-08-18 | End: 2020-08-19 | Stop reason: HOSPADM

## 2020-08-18 RX ORDER — LIDOCAINE HYDROCHLORIDE 20 MG/ML
INJECTION, SOLUTION INTRAVENOUS PRN
Status: DISCONTINUED | OUTPATIENT
Start: 2020-08-18 | End: 2020-08-18 | Stop reason: SDUPTHER

## 2020-08-18 RX ORDER — FENTANYL CITRATE 50 UG/ML
INJECTION, SOLUTION INTRAMUSCULAR; INTRAVENOUS PRN
Status: DISCONTINUED | OUTPATIENT
Start: 2020-08-18 | End: 2020-08-18 | Stop reason: SDUPTHER

## 2020-08-18 RX ORDER — KETAMINE HYDROCHLORIDE 10 MG/ML
INJECTION, SOLUTION INTRAMUSCULAR; INTRAVENOUS PRN
Status: DISCONTINUED | OUTPATIENT
Start: 2020-08-18 | End: 2020-08-18 | Stop reason: SDUPTHER

## 2020-08-18 RX ORDER — HYDROMORPHONE HCL 110MG/55ML
0.25 PATIENT CONTROLLED ANALGESIA SYRINGE INTRAVENOUS EVERY 5 MIN PRN
Status: DISCONTINUED | OUTPATIENT
Start: 2020-08-18 | End: 2020-08-18 | Stop reason: HOSPADM

## 2020-08-18 RX ORDER — HYDROMORPHONE HCL 110MG/55ML
0.5 PATIENT CONTROLLED ANALGESIA SYRINGE INTRAVENOUS EVERY 5 MIN PRN
Status: DISCONTINUED | OUTPATIENT
Start: 2020-08-18 | End: 2020-08-19 | Stop reason: HOSPADM

## 2020-08-18 RX ORDER — ONDANSETRON 2 MG/ML
4 INJECTION INTRAMUSCULAR; INTRAVENOUS
Status: ACTIVE | OUTPATIENT
Start: 2020-08-18 | End: 2020-08-18

## 2020-08-18 RX ORDER — MORPHINE SULFATE 2 MG/ML
2 INJECTION, SOLUTION INTRAMUSCULAR; INTRAVENOUS EVERY 5 MIN PRN
Status: DISCONTINUED | OUTPATIENT
Start: 2020-08-18 | End: 2020-08-18 | Stop reason: HOSPADM

## 2020-08-18 RX ORDER — HYDROMORPHONE HCL 110MG/55ML
PATIENT CONTROLLED ANALGESIA SYRINGE INTRAVENOUS PRN
Status: DISCONTINUED | OUTPATIENT
Start: 2020-08-18 | End: 2020-08-18 | Stop reason: SDUPTHER

## 2020-08-18 RX ORDER — ONDANSETRON 2 MG/ML
INJECTION INTRAMUSCULAR; INTRAVENOUS PRN
Status: DISCONTINUED | OUTPATIENT
Start: 2020-08-18 | End: 2020-08-18 | Stop reason: SDUPTHER

## 2020-08-18 RX ORDER — HYDROMORPHONE HCL 110MG/55ML
0.25 PATIENT CONTROLLED ANALGESIA SYRINGE INTRAVENOUS EVERY 5 MIN PRN
Status: DISCONTINUED | OUTPATIENT
Start: 2020-08-18 | End: 2020-08-19 | Stop reason: HOSPADM

## 2020-08-18 RX ORDER — LABETALOL HYDROCHLORIDE 5 MG/ML
5 INJECTION, SOLUTION INTRAVENOUS EVERY 10 MIN PRN
Status: DISCONTINUED | OUTPATIENT
Start: 2020-08-18 | End: 2020-08-18 | Stop reason: HOSPADM

## 2020-08-18 RX ORDER — HYDROMORPHONE HCL 110MG/55ML
0.5 PATIENT CONTROLLED ANALGESIA SYRINGE INTRAVENOUS EVERY 5 MIN PRN
Status: DISCONTINUED | OUTPATIENT
Start: 2020-08-18 | End: 2020-08-18 | Stop reason: HOSPADM

## 2020-08-18 RX ORDER — HYDRALAZINE HYDROCHLORIDE 20 MG/ML
5 INJECTION INTRAMUSCULAR; INTRAVENOUS EVERY 10 MIN PRN
Status: DISCONTINUED | OUTPATIENT
Start: 2020-08-18 | End: 2020-08-19 | Stop reason: HOSPADM

## 2020-08-18 RX ORDER — FENTANYL CITRATE 50 UG/ML
25 INJECTION, SOLUTION INTRAMUSCULAR; INTRAVENOUS EVERY 5 MIN PRN
Status: DISCONTINUED | OUTPATIENT
Start: 2020-08-18 | End: 2020-08-18 | Stop reason: HOSPADM

## 2020-08-18 RX ORDER — LABETALOL HYDROCHLORIDE 5 MG/ML
5 INJECTION, SOLUTION INTRAVENOUS EVERY 10 MIN PRN
Status: DISCONTINUED | OUTPATIENT
Start: 2020-08-18 | End: 2020-08-19 | Stop reason: HOSPADM

## 2020-08-18 RX ORDER — ROCURONIUM BROMIDE 10 MG/ML
INJECTION, SOLUTION INTRAVENOUS PRN
Status: DISCONTINUED | OUTPATIENT
Start: 2020-08-18 | End: 2020-08-18 | Stop reason: SDUPTHER

## 2020-08-18 RX ORDER — CIPROFLOXACIN 2 MG/ML
INJECTION, SOLUTION INTRAVENOUS PRN
Status: DISCONTINUED | OUTPATIENT
Start: 2020-08-18 | End: 2020-08-18 | Stop reason: SDUPTHER

## 2020-08-18 RX ORDER — HYDRALAZINE HYDROCHLORIDE 20 MG/ML
5 INJECTION INTRAMUSCULAR; INTRAVENOUS EVERY 10 MIN PRN
Status: DISCONTINUED | OUTPATIENT
Start: 2020-08-18 | End: 2020-08-18 | Stop reason: HOSPADM

## 2020-08-18 RX ORDER — PROPOFOL 10 MG/ML
INJECTION, EMULSION INTRAVENOUS PRN
Status: DISCONTINUED | OUTPATIENT
Start: 2020-08-18 | End: 2020-08-18 | Stop reason: SDUPTHER

## 2020-08-18 RX ORDER — DEXAMETHASONE SODIUM PHOSPHATE 4 MG/ML
INJECTION, SOLUTION INTRA-ARTICULAR; INTRALESIONAL; INTRAMUSCULAR; INTRAVENOUS; SOFT TISSUE PRN
Status: DISCONTINUED | OUTPATIENT
Start: 2020-08-18 | End: 2020-08-18 | Stop reason: SDUPTHER

## 2020-08-18 RX ORDER — PHENYLEPHRINE HYDROCHLORIDE 10 MG/ML
INJECTION INTRAVENOUS PRN
Status: DISCONTINUED | OUTPATIENT
Start: 2020-08-18 | End: 2020-08-18 | Stop reason: SDUPTHER

## 2020-08-18 RX ORDER — PROMETHAZINE HYDROCHLORIDE 25 MG/ML
6.25 INJECTION, SOLUTION INTRAMUSCULAR; INTRAVENOUS
Status: ACTIVE | OUTPATIENT
Start: 2020-08-18 | End: 2020-08-18

## 2020-08-18 RX ORDER — VECURONIUM BROMIDE 1 MG/ML
INJECTION, POWDER, LYOPHILIZED, FOR SOLUTION INTRAVENOUS PRN
Status: DISCONTINUED | OUTPATIENT
Start: 2020-08-18 | End: 2020-08-18 | Stop reason: SDUPTHER

## 2020-08-18 RX ORDER — PROMETHAZINE HYDROCHLORIDE 25 MG/ML
6.25 INJECTION, SOLUTION INTRAMUSCULAR; INTRAVENOUS
Status: DISCONTINUED | OUTPATIENT
Start: 2020-08-18 | End: 2020-08-18 | Stop reason: HOSPADM

## 2020-08-18 RX ADMIN — DEXAMETHASONE SODIUM PHOSPHATE 8 MG: 4 INJECTION, SOLUTION INTRAMUSCULAR; INTRAVENOUS at 09:52

## 2020-08-18 RX ADMIN — SODIUM CHLORIDE, PRESERVATIVE FREE 10 ML: 5 INJECTION INTRAVENOUS at 21:17

## 2020-08-18 RX ADMIN — LIDOCAINE HYDROCHLORIDE 100 MG: 20 INJECTION, SOLUTION INTRAVENOUS at 09:52

## 2020-08-18 RX ADMIN — SODIUM CHLORIDE, POTASSIUM CHLORIDE, SODIUM LACTATE AND CALCIUM CHLORIDE: 600; 310; 30; 20 INJECTION, SOLUTION INTRAVENOUS at 13:31

## 2020-08-18 RX ADMIN — PHENYLEPHRINE HYDROCHLORIDE 100 MCG: 10 INJECTION INTRAVENOUS at 10:40

## 2020-08-18 RX ADMIN — PROPOFOL 130 MG: 10 INJECTION, EMULSION INTRAVENOUS at 09:52

## 2020-08-18 RX ADMIN — CEFTRIAXONE 1 G: 1 INJECTION, POWDER, FOR SOLUTION INTRAMUSCULAR; INTRAVENOUS at 05:11

## 2020-08-18 RX ADMIN — FENTANYL CITRATE 100 MCG: 50 INJECTION INTRAMUSCULAR; INTRAVENOUS at 09:52

## 2020-08-18 RX ADMIN — SODIUM CHLORIDE, POTASSIUM CHLORIDE, SODIUM LACTATE AND CALCIUM CHLORIDE: 600; 310; 30; 20 INJECTION, SOLUTION INTRAVENOUS at 02:47

## 2020-08-18 RX ADMIN — FENTANYL CITRATE 25 MCG: 50 INJECTION, SOLUTION INTRAMUSCULAR; INTRAVENOUS at 13:17

## 2020-08-18 RX ADMIN — Medication 0.4 MG: at 10:45

## 2020-08-18 RX ADMIN — ONDANSETRON 4 MG: 2 INJECTION INTRAMUSCULAR; INTRAVENOUS at 14:58

## 2020-08-18 RX ADMIN — FENTANYL CITRATE 25 MCG: 50 INJECTION, SOLUTION INTRAMUSCULAR; INTRAVENOUS at 14:00

## 2020-08-18 RX ADMIN — FENTANYL CITRATE 25 MCG: 50 INJECTION, SOLUTION INTRAMUSCULAR; INTRAVENOUS at 13:23

## 2020-08-18 RX ADMIN — CIPROFLOXACIN 400 MG: 2 INJECTION, SOLUTION INTRAVENOUS at 10:33

## 2020-08-18 RX ADMIN — LORAZEPAM 1 MG: 2 INJECTION INTRAMUSCULAR; INTRAVENOUS at 05:12

## 2020-08-18 RX ADMIN — METRONIDAZOLE 500 MG: 500 INJECTION, SOLUTION INTRAVENOUS at 14:57

## 2020-08-18 RX ADMIN — GABAPENTIN 800 MG: 400 CAPSULE ORAL at 21:29

## 2020-08-18 RX ADMIN — VECURONIUM BROMIDE FOR INJECTION 2 MG: 1 INJECTION, POWDER, LYOPHILIZED, FOR SOLUTION INTRAVENOUS at 11:04

## 2020-08-18 RX ADMIN — CIPROFLOXACIN 1 MG: 2 INJECTION, SOLUTION INTRAVENOUS at 11:37

## 2020-08-18 RX ADMIN — SUGAMMADEX 200 MG: 100 INJECTION, SOLUTION INTRAVENOUS at 12:28

## 2020-08-18 RX ADMIN — PHENYLEPHRINE HYDROCHLORIDE 200 MCG: 10 INJECTION INTRAVENOUS at 09:55

## 2020-08-18 RX ADMIN — METOCLOPRAMIDE 10 MG: 5 INJECTION, SOLUTION INTRAMUSCULAR; INTRAVENOUS at 22:42

## 2020-08-18 RX ADMIN — ONDANSETRON 4 MG: 2 INJECTION INTRAMUSCULAR; INTRAVENOUS at 09:52

## 2020-08-18 RX ADMIN — METRONIDAZOLE 500 MG: 500 INJECTION, SOLUTION INTRAVENOUS at 21:18

## 2020-08-18 RX ADMIN — HYDROMORPHONE HYDROCHLORIDE 0.6 MG: 2 INJECTION INTRAMUSCULAR; INTRAVENOUS; SUBCUTANEOUS at 10:55

## 2020-08-18 RX ADMIN — PHENYLEPHRINE HYDROCHLORIDE 100 MCG: 10 INJECTION INTRAVENOUS at 12:13

## 2020-08-18 RX ADMIN — KETAMINE HYDROCHLORIDE 20 MG: 10 INJECTION INTRAMUSCULAR; INTRAVENOUS at 11:43

## 2020-08-18 RX ADMIN — VECURONIUM BROMIDE FOR INJECTION 2 MG: 1 INJECTION, POWDER, LYOPHILIZED, FOR SOLUTION INTRAVENOUS at 10:26

## 2020-08-18 RX ADMIN — LORAZEPAM 1 MG: 2 INJECTION INTRAMUSCULAR; INTRAVENOUS at 21:17

## 2020-08-18 RX ADMIN — HYDROMORPHONE HYDROCHLORIDE 0.5 MG: 2 INJECTION, SOLUTION INTRAMUSCULAR; INTRAVENOUS; SUBCUTANEOUS at 13:29

## 2020-08-18 RX ADMIN — SODIUM CHLORIDE, POTASSIUM CHLORIDE, SODIUM LACTATE AND CALCIUM CHLORIDE: 600; 310; 30; 20 INJECTION, SOLUTION INTRAVENOUS at 09:39

## 2020-08-18 RX ADMIN — MORPHINE SULFATE 4 MG: 4 INJECTION, SOLUTION INTRAMUSCULAR; INTRAVENOUS at 05:11

## 2020-08-18 RX ADMIN — ONDANSETRON 4 MG: 2 INJECTION INTRAMUSCULAR; INTRAVENOUS at 06:01

## 2020-08-18 RX ADMIN — ONDANSETRON 4 MG: 2 INJECTION INTRAMUSCULAR; INTRAVENOUS at 22:44

## 2020-08-18 RX ADMIN — MORPHINE SULFATE 4 MG: 4 INJECTION, SOLUTION INTRAMUSCULAR; INTRAVENOUS at 01:02

## 2020-08-18 RX ADMIN — METRONIDAZOLE 500 MG: 500 INJECTION, SOLUTION INTRAVENOUS at 05:11

## 2020-08-18 RX ADMIN — ROCURONIUM BROMIDE 50 MG: 10 INJECTION INTRAVENOUS at 09:52

## 2020-08-18 RX ADMIN — PHENYLEPHRINE HYDROCHLORIDE 100 MCG: 10 INJECTION INTRAVENOUS at 10:36

## 2020-08-18 RX ADMIN — MORPHINE SULFATE 4 MG: 4 INJECTION, SOLUTION INTRAMUSCULAR; INTRAVENOUS at 16:52

## 2020-08-18 RX ADMIN — PHENYLEPHRINE HYDROCHLORIDE 200 MCG: 10 INJECTION INTRAVENOUS at 10:45

## 2020-08-18 RX ADMIN — METOCLOPRAMIDE 10 MG: 5 INJECTION, SOLUTION INTRAMUSCULAR; INTRAVENOUS at 06:01

## 2020-08-18 RX ADMIN — MORPHINE SULFATE 4 MG: 4 INJECTION, SOLUTION INTRAMUSCULAR; INTRAVENOUS at 21:17

## 2020-08-18 RX ADMIN — KETAMINE HYDROCHLORIDE 10 MG: 10 INJECTION INTRAMUSCULAR; INTRAVENOUS at 10:36

## 2020-08-18 RX ADMIN — METRONIDAZOLE 500 MG: 500 INJECTION, SOLUTION INTRAVENOUS at 10:16

## 2020-08-18 RX ADMIN — KETAMINE HYDROCHLORIDE 60 MG: 10 INJECTION INTRAMUSCULAR; INTRAVENOUS at 09:52

## 2020-08-18 RX ADMIN — HYDROMORPHONE HYDROCHLORIDE 0.4 MG: 2 INJECTION INTRAMUSCULAR; INTRAVENOUS; SUBCUTANEOUS at 11:43

## 2020-08-18 ASSESSMENT — PULMONARY FUNCTION TESTS
PIF_VALUE: 1
PIF_VALUE: 17
PIF_VALUE: 15
PIF_VALUE: 14
PIF_VALUE: 14
PIF_VALUE: 16
PIF_VALUE: 16
PIF_VALUE: 14
PIF_VALUE: 13
PIF_VALUE: 14
PIF_VALUE: 16
PIF_VALUE: 15
PIF_VALUE: 16
PIF_VALUE: 0
PIF_VALUE: 16
PIF_VALUE: 14
PIF_VALUE: 15
PIF_VALUE: 17
PIF_VALUE: 14
PIF_VALUE: 14
PIF_VALUE: 15
PIF_VALUE: 14
PIF_VALUE: 15
PIF_VALUE: 14
PIF_VALUE: 14
PIF_VALUE: 17
PIF_VALUE: 15
PIF_VALUE: 14
PIF_VALUE: 15
PIF_VALUE: 17
PIF_VALUE: 15
PIF_VALUE: 14
PIF_VALUE: 2
PIF_VALUE: 1
PIF_VALUE: 18
PIF_VALUE: 14
PIF_VALUE: 14
PIF_VALUE: 17
PIF_VALUE: 14
PIF_VALUE: 15
PIF_VALUE: 14
PIF_VALUE: 1
PIF_VALUE: 17
PIF_VALUE: 17
PIF_VALUE: 18
PIF_VALUE: 14
PIF_VALUE: 1
PIF_VALUE: 16
PIF_VALUE: 17
PIF_VALUE: 18
PIF_VALUE: 18
PIF_VALUE: 16
PIF_VALUE: 17
PIF_VALUE: 15
PIF_VALUE: 11
PIF_VALUE: 15
PIF_VALUE: 16
PIF_VALUE: 1
PIF_VALUE: 17
PIF_VALUE: 15
PIF_VALUE: 15
PIF_VALUE: 17
PIF_VALUE: 0
PIF_VALUE: 14
PIF_VALUE: 17
PIF_VALUE: 14
PIF_VALUE: 14
PIF_VALUE: 17
PIF_VALUE: 17
PIF_VALUE: 16
PIF_VALUE: 17
PIF_VALUE: 1
PIF_VALUE: 14
PIF_VALUE: 15
PIF_VALUE: 18
PIF_VALUE: 18
PIF_VALUE: 14
PIF_VALUE: 17
PIF_VALUE: 17
PIF_VALUE: 15
PIF_VALUE: 14
PIF_VALUE: 17
PIF_VALUE: 19
PIF_VALUE: 16
PIF_VALUE: 16
PIF_VALUE: 15
PIF_VALUE: 16
PIF_VALUE: 15
PIF_VALUE: 15
PIF_VALUE: 17
PIF_VALUE: 17
PIF_VALUE: 14
PIF_VALUE: 18
PIF_VALUE: 14
PIF_VALUE: 16
PIF_VALUE: 15
PIF_VALUE: 16
PIF_VALUE: 17
PIF_VALUE: 14
PIF_VALUE: 18
PIF_VALUE: 15
PIF_VALUE: 14
PIF_VALUE: 16
PIF_VALUE: 14
PIF_VALUE: 15
PIF_VALUE: 13
PIF_VALUE: 18
PIF_VALUE: 16
PIF_VALUE: 15
PIF_VALUE: 15
PIF_VALUE: 3
PIF_VALUE: 5
PIF_VALUE: 14
PIF_VALUE: 15
PIF_VALUE: 15
PIF_VALUE: 17
PIF_VALUE: 1
PIF_VALUE: 16
PIF_VALUE: 16
PIF_VALUE: 14
PIF_VALUE: 15
PIF_VALUE: 17
PIF_VALUE: 14
PIF_VALUE: 15
PIF_VALUE: 1
PIF_VALUE: 17
PIF_VALUE: 17
PIF_VALUE: 15
PIF_VALUE: 14
PIF_VALUE: 13
PIF_VALUE: 0
PIF_VALUE: 16
PIF_VALUE: 17
PIF_VALUE: 4
PIF_VALUE: 1
PIF_VALUE: 14
PIF_VALUE: 14
PIF_VALUE: 0
PIF_VALUE: 15
PIF_VALUE: 16
PIF_VALUE: 14
PIF_VALUE: 14
PIF_VALUE: 15
PIF_VALUE: 15
PIF_VALUE: 18
PIF_VALUE: 17
PIF_VALUE: 16
PIF_VALUE: 14
PIF_VALUE: 16
PIF_VALUE: 17
PIF_VALUE: 16
PIF_VALUE: 15
PIF_VALUE: 16
PIF_VALUE: 15
PIF_VALUE: 15
PIF_VALUE: 4
PIF_VALUE: 14
PIF_VALUE: 15
PIF_VALUE: 14
PIF_VALUE: 16
PIF_VALUE: 17
PIF_VALUE: 17
PIF_VALUE: 16
PIF_VALUE: 17
PIF_VALUE: 14
PIF_VALUE: 13
PIF_VALUE: 15
PIF_VALUE: 11
PIF_VALUE: 17
PIF_VALUE: 17
PIF_VALUE: 15
PIF_VALUE: 13
PIF_VALUE: 15
PIF_VALUE: 16

## 2020-08-18 ASSESSMENT — PAIN SCALES - GENERAL
PAINLEVEL_OUTOF10: 10
PAINLEVEL_OUTOF10: 9
PAINLEVEL_OUTOF10: 10
PAINLEVEL_OUTOF10: 8
PAINLEVEL_OUTOF10: 6
PAINLEVEL_OUTOF10: 10
PAINLEVEL_OUTOF10: 8
PAINLEVEL_OUTOF10: 10
PAINLEVEL_OUTOF10: 8

## 2020-08-18 ASSESSMENT — PAIN DESCRIPTION - DESCRIPTORS
DESCRIPTORS: ACHING
DESCRIPTORS: SORE;SHARP;ACHING
DESCRIPTORS: ACHING

## 2020-08-18 ASSESSMENT — ENCOUNTER SYMPTOMS
ALLERGIC/IMMUNOLOGIC NEGATIVE: 1
RESPIRATORY NEGATIVE: 1
ABDOMINAL PAIN: 1
EYES NEGATIVE: 1

## 2020-08-18 ASSESSMENT — PAIN DESCRIPTION - PROGRESSION
CLINICAL_PROGRESSION: NOT CHANGED

## 2020-08-18 ASSESSMENT — PAIN DESCRIPTION - ONSET
ONSET: ON-GOING
ONSET: ON-GOING
ONSET: AWAKENED FROM SLEEP

## 2020-08-18 ASSESSMENT — PAIN DESCRIPTION - LOCATION
LOCATION: ABDOMEN

## 2020-08-18 ASSESSMENT — PAIN DESCRIPTION - FREQUENCY
FREQUENCY: CONTINUOUS

## 2020-08-18 ASSESSMENT — PAIN DESCRIPTION - ORIENTATION
ORIENTATION: LOWER
ORIENTATION: LOWER
ORIENTATION: MID

## 2020-08-18 ASSESSMENT — PAIN DESCRIPTION - PAIN TYPE
TYPE: SURGICAL PAIN
TYPE: ACUTE PAIN
TYPE: SURGICAL PAIN
TYPE: ACUTE PAIN

## 2020-08-18 ASSESSMENT — LIFESTYLE VARIABLES: SMOKING_STATUS: 1

## 2020-08-18 ASSESSMENT — PAIN - FUNCTIONAL ASSESSMENT
PAIN_FUNCTIONAL_ASSESSMENT: ACTIVITIES ARE NOT PREVENTED

## 2020-08-18 NOTE — PROGRESS NOTES
Cardiology Progress Note     Admit Date:  8/11/2020    Consult reason/ Seen today for :   ascvd  Post stent    Subjective and  Overnight Events : Status post hemicolectomy surgery today      Chief complain on admission : 62 y. o.year old who is admitted for  Chief Complaint   Patient presents with    Rectal Bleeding     on plavix, hx of hemrrhoids, states \"for a couple months\"     Constipation     states been 1 week    Abdominal Pain     \"all over pain\", started today     Hypotension     92/68 upon arrival       Assessment / Plan:  ASCVD: s/p PCI in may 2020  , restart aspirin and Plavix as soon as possible  Preop bowel resection: Integrilin was stopped 6 hours before surgery currently still on hold  Hypertension: Not well controlled. Patient blood pressures have been in the 314R to 202B systolic. We will increase patient carvedilol to 12.5 mg twice a day and Norvasc 10 mg daily with holding parameters. Dyslipidemia: We will recheck lipid panel. Patient was on Lipitor 80 mg daily prior to admission. DVT Prophylaxis if no contraindication    Past medical history:    has a past medical history of Amphetamine abuse ([facility] Cocaine abuse ([facility] and NSTEMI (non-ST elevated myocardial infarction) ([facility] Past surgical history:   has a past surgical history that includes sigmoidoscopy (N/A, 8/13/2020). Social History:   reports that she has been smoking. She has never used smokeless tobacco. She reports current drug use. Drugs: Cocaine, Marijuana, Opiates , and Methamphetamines. She reports that she does not drink alcohol. Family history:  family history is not on file.     No Known Allergies    Review of Systems:    All 14 systems were reviewed and are negative  Except for the positive findings  which as documented     /79   Pulse 78   Temp 97 °F (36.1 °C) (Temporal)   Resp 18   Ht 5' 2\" (1.575 m)   Wt 151 lb 10.8 oz (68.8 40 mg Intravenous Daily    metoclopramide  10 mg Intravenous Q8H    ondansetron  4 mg Intravenous Q8H    sodium chloride flush  10 mL Intravenous BID      lactated ringers 100 mL/hr at 08/18/20 1331     LORazepam, morphine, hydrALAZINE (APRESOLINE) ivpb, sodium chloride flush, acetaminophen **OR** acetaminophen, magnesium hydroxide, promethazine **OR** [DISCONTINUED] ondansetron    Lab Data:  CBC:   No results for input(s): WBC, HGB, HCT, MCV, PLT in the last 72 hours. BMP: No results for input(s): NA, K, CL, CO2, PHOS, BUN, CREATININE in the last 72 hours. Invalid input(s): CA  PT/INR: No results for input(s): PROTIME, INR in the last 72 hours. BNP:  No results for input(s): PROBNP in the last 72 hours. TROPONIN: No results for input(s): TROPONINT in the last 72 hours. ECHO :   Echocardiogram 4/29/2020 at [address] [place] [address] Way:    1. Left ventricle: The cavity size was normal. Wall thickness was     moderately increased. There was moderate concentric hypertrophy. Systolic function was normal. The estimated ejection fraction     was in the range of 55% to 60%. Wall motion was normal; there     were no regional wall motion abnormalities. Doppler parameters     are consistent with abnormal left ventricular relaxation (grade     1 diastolic dysfunction). 2. Right ventricle: The cavity size was normal. Wall thickness was     normal. Systolic function was normal.      Assessment:  62 y. o.year old who is admitted for  Chief Complaint   Patient presents with    Rectal Bleeding     on plavix, hx of hemrrhoids, states \"for a couple months\"     Constipation     states been 1 week    Abdominal Pain     \"all over pain\", started today     Hypotension     92/68 upon arrival     , active issues as noted below:    Impression:  Active Problems:    Acute abdominal pain    Severe malnutrition (HCC)    Generalized abdominal pain  Resolved Problems:    * No resolved hospital problems.  *        All labs, medications and tests reviewed by myself , continue all other medications of all above medical condition listed as is except for changes mentioned above. Thank you very much for consult , please call with questions.     Electronically signed by Shilpi Parrish MD on     Shilpi Parrish MD [facility] 08/18/20

## 2020-08-18 NOTE — PROGRESS NOTES
Comprehensive Nutrition Assessment    Type and Reason for Visit:  Initial(los 7)    Nutrition Recommendations/Plan:   · Continue NPO  · Consider PN with severe malnutrition, if prolonged NPO expected    Nutrition Assessment:  Pt admitted with 4 week h/o abdominal pain. Currently in OR for planned sigmoid resection 2/2 stricture. Has been Clear Liquid/NPO since admission. Significant wt loss over past 3 months. Meets criteria for severe malnutrition. Malnutrition Assessment:  Malnutrition Status:  Severe malnutrition    Context:  Acute Illness     Findings of the 6 clinical characteristics of malnutrition:  Energy Intake:  50% or less of estimated energy requirements for 5 or more days  Weight Loss:  7.5% over 3 months     Body Fat Loss:  Unable to assess     Muscle Mass Loss:  Unable to assess    Fluid Accumulation:  No significant fluid accumulation     Strength:  Not Performed    Estimated Daily Nutrient Needs:  Energy (kcal):  1624-9850 ([place]-St Jeor)   Protein (g):  83-97 (1.2-1.4 g/kg)   Fluid (ml/day):  8025-2695 (1 ml/kcal)     Wounds:  Surgical Wound       Current Nutrition Therapies:    Diet NPO Effective Now    Anthropometric Measures:  · Height: 5' 2\" (157.5 cm)  · Current Body Weight: 151 lb 10.8 oz (68.8 kg)   · Admission Body Weight: 150 lb 9 oz (68.3 kg)    · Usual Body Weight: 183 lb 1.6 oz (83.1 kg)(5/5/20)     · Ideal Body Weight: 110 lbs; % Ideal Body Weight 137.9 %   · BMI: 27.7  · BMI Categories: Overweight (BMI 25.0-29. 9)       Nutrition Diagnosis:   · Severe malnutrition, In context of acute illness or injury related to altered GI structure, inadequate protein-energy intake as evidenced by NPO or clear liquid status due to medical condition, poor intake prior to admission, weight loss 7.5% in 3 months    Nutrition Interventions:   Food and/or Nutrient Delivery:  Continue NPO  Nutrition Education/Counseling:  No recommendation at this time   Coordination of Nutrition Care: Continued Inpatient Monitoring    Goals:  Pt will tolerate a source of nutrition within 48 hr       Nutrition Monitoring and Evaluation:   Food/Nutrient Intake Outcomes:  Diet Advancement/Tolerance  Physical Signs/Symptoms Outcomes:  Biochemical Data, GI Status, Nutrition Focused Physical Findings, Skin, Weight     Discharge Planning:     Too soon to determine     Electronically signed by Cholo Mendoza RD, LD on 8/18/20 at 11:30 AM EDT    Contact: [phone]

## 2020-08-18 NOTE — PROGRESS NOTES
1248: Patient arrived to PACU from OR. Monitors applied, alarms on. Oral airway remains in place. 1317: Patient complaining of pain, patient tried repositioning, then patient medicated. 1330: Patient turned and repositioned in bed.   1340: Patient resting in bed, VS stable, breathing unlabored. 1415: Report called to [facility] for room [facility].   1428: Patient transferred to room [facility].

## 2020-08-18 NOTE — PROGRESS NOTES
General Surgery-Dr. Delma Burns Day: 8    Chief Complaint on Admission: large bowel obstruction       Subjective:     No acute events. Pt finished entire bowel prep yesterday. Per nurse, finished prior to midnight. Pt having clear liquid BMs. NPO since midnight. Integrilin drip was stopped at 6 am today. ROS:  Review of Systems   Constitutional: Positive for fatigue. HENT: Negative. Eyes: Negative. Respiratory: Negative. Cardiovascular: Negative. Gastrointestinal: Positive for abdominal pain. Endocrine: Negative. Genitourinary: Negative. Musculoskeletal: Negative. Skin: Negative. Allergic/Immunologic: Negative. Neurological: Negative. Hematological: Negative. Psychiatric/Behavioral: Negative. Allergies  Patient has no known allergies. Diagnosis Date    [facility]mp[facility] abuse ([facility]     Cocaine abuse ([facility]     NSTEMI (non-ST elevated myocardial infarction) ([facility] 2020       Objective:     Vitals:    20 0427   BP: (!) 132/98   Pulse: 66   Resp: 20   Temp: 98.4 °F (36.9 °C)   SpO2: 96%       TEMPERATURE:  Current -Temp: 98.4 °F (36.9 °C); Max - Temp  Av.1 °F (36.7 °C)  Min: 97.3 °F (36.3 °C)  Max: 98.4 °F (36.9 °C)    I/O this shift: In: 9483.6 [I.V.:9483.6]  Out: - I/O last 3 completed shifts: In: 20 [I.V.:20]  Out: -       Physical Exam:  Physical Exam  Vitals signs reviewed. Constitutional:       General: She is not in acute distress. Appearance: Normal appearance. She is not ill-appearing, toxic-appearing or diaphoretic. HENT:      Head: Normocephalic and atraumatic. Right Ear: External ear normal.      Left Ear: External ear normal.   Eyes:      General:         Left eye: No discharge. Extraocular Movements: Extraocular movements intact. Cardiovascular:      Rate and Rhythm: Normal rate. Pulmonary:      Effort: No respiratory distress. Abdominal:      General: There is distension (mild). Palpations: Abdomen is soft. Tenderness: There is abdominal tenderness (min and less than yesterday). Musculoskeletal:         General: No swelling. Skin:     General: Skin is warm. Neurological:      General: No focal deficit present. Mental Status: She is alert.    Psychiatric:         Mood and Affect: Mood normal.           Scheduled Meds:   carvedilol  12.5 mg Oral BID WC    amLODIPine  10 mg Oral Daily    nicotine  1 patch Transdermal Daily    sodium chloride flush  10 mL Intravenous 2 times per day    escitalopram  20 mg Oral Daily    levothyroxine  100 mcg Oral Daily    budesonide-formoterol  2 puff Inhalation BID    gabapentin  800 mg Oral TID    cefTRIAXone (ROCEPHIN) IV  1 g Intravenous Q24H    metroNIDAZOLE  500 mg Intravenous Q8H    pantoprazole  40 mg Intravenous Daily    metoclopramide  10 mg Intravenous Q8H    ondansetron  4 mg Intravenous Q8H    sodium chloride flush  10 mL Intravenous BID     ContinuousInfusions:   eptifibatide 2 mcg/kg/min (08/17/20 2208)    lactated ringers 100 mL/hr at 08/18/20 0247     PRN Meds:LORazepam, morphine, hydrALAZINE (APRESOLINE) ivpb, sodium chloride flush, acetaminophen **OR** acetaminophen, magnesium hydroxide, promethazine **OR** [DISCONTINUED] ondansetron      Labs/Imaging Results:   Lab Results   Component Value Date    WBC 10.8 (H) 08/12/2020    HGB 12.6 08/15/2020    HCT 39.4 08/15/2020    MCV 92.6 08/12/2020     08/12/2020     Lab Results   Component Value Date     08/12/2020    K 4.1 08/12/2020     08/12/2020    CO2 22 08/12/2020    BUN 10 08/12/2020    CREATININE 0.5 (L) 08/12/2020    GLUCOSE 126 (H) 08/12/2020    CALCIUM 8.2 (L) 08/12/2020    PROT 6.2 (L) 08/12/2020    LABALBU 2.8 (L) 08/12/2020    BILITOT 0.2 08/12/2020    ALKPHOS 95 08/12/2020    AST 40 (H) 08/12/2020    ALT 30 08/12/2020    LABGLOM >60 08/12/2020    GFRAA >60 08/12/2020       Assessment:     61 y/o F with stricture of proximal sigmoid colon    Plan:     -To OR this AM for planned sigmoid resection.    -Consent obtained and in chart.     -Reviewed in detail with the patient and/or family the expected pre-operative, operative, and post-operative courses including risks, benefits, and alternatives to the procedure. The patient's questions were answered in detail and agreed to proceed with the procedure.     -The patient was counseled at length about the risks of yoselyn Covid-19 during their perioperative period and any recovery window from their procedure. The patient was made aware that yoselyn Covid-19  may worsen their prognosis for recovering from their procedure  and lend to a higher morbidity and/or mortality risk. All material risks, benefits, and reasonable alternatives including postponing the procedure were discussed. The patient does wish to proceed with the procedure at this time.               Electronically signed by Jonathon Chino II, MD on 8/18/2020 at 6:24 AM

## 2020-08-18 NOTE — ANESTHESIA PRE PROCEDURE
Department of Anesthesiology  Preprocedure Note       Name:  Gerson Yao   Age:  62 y.o.  :  1960                                          MRN:  [id number]         Date:  2020      Surgeon: Madelin Olivier):  Makayla Samson MD    Procedure: Procedure(s):  LEFT BOWEL RESECTION HEMICOLECTOMY, PRIMARY ANASTOMOSIS POSSIBLE LOOP ILEOSTOMY    Medications prior to admission:   Prior to Admission medications    Medication Sig Start Date End Date Taking? Authorizing Provider   cephALEXin (KEFLEX) 500 MG capsule Take 1 capsule by mouth 4 times daily for 10 days 20  LIZETTE Ramirez   sulfamethoxazole-trimethoprim (BACTRIM DS) 800-160 MG per tablet Take 1 tablet by mouth 2 times daily for 10 days 20  LIZETTE Ramirez   amLODIPine (NORVASC) 5 MG tablet Take 1 tablet by mouth daily 20   Gabriela Gannon MD   traZODone (DESYREL) 50 MG tablet Take 50 mg by mouth nightly    Historical Provider, MD   mometasone-formoterol (DULERA) 100-5 MCG/ACT inhaler Inhale 2 puffs into the lungs 2 times daily    Historical Provider, MD   fluticasone (FLONASE) 50 MCG/ACT nasal spray 2 sprays by Each Nostril route daily    Historical Provider, MD   levothyroxine (SYNTHROID) 100 MCG tablet Take 100 mcg by mouth Daily    Historical Provider, MD   gabapentin (NEURONTIN) 800 MG tablet Take 800 mg by mouth 3 times daily.     Historical Provider, MD   promethazine (PHENERGAN) 25 MG tablet Take 25 mg by mouth every 6 hours as needed for Nausea    Historical Provider, MD   albuterol sulfate HFA (VENTOLIN HFA) 108 (90 Base) MCG/ACT inhaler Inhale 1 puff into the lungs every 6 hours as needed for Wheezing    Historical Provider, MD   escitalopram (LEXAPRO) 20 MG tablet Take 20 mg by mouth daily    Historical Provider, MD   docusate sodium (COLACE) 100 MG capsule Take 100 mg by mouth 2 times daily    Historical Provider, MD   atorvastatin (LIPITOR) 80 MG tablet Take 80 mg by mouth nightly    Historical Provider, MD aspirin 81 MG chewable tablet Take 81 mg by mouth daily    Historical Provider, MD   carvedilol (COREG) 3.125 MG tablet Take 3.125 mg by mouth 2 times daily (with meals)    Historical Provider, MD   traMADol (ULTRAM) 50 MG tablet Take 50 mg by mouth every 6 hours as needed for Pain.     Historical Provider, MD   clopidogrel (PLAVIX) 75 MG tablet Take 75 mg by mouth daily    Historical Provider, MD   fluticasone-salmeterol (ADVAIR) 250-50 MCG/DOSE AEPB Inhale 1 puff into the lungs 2 times daily    Historical Provider, MD       Current medications:    Current Facility-Administered Medications   Medication Dose Route Frequency Provider Last Rate Last Dose    carvedilol (COREG) tablet 12.5 mg  12.5 mg Oral BID WC Rayleen Gaucher, APRN - CNP   12.5 mg at 08/17/20 1608    amLODIPine (NORVASC) tablet 10 mg  10 mg Oral Daily Rayleen Gaucher, APRN - CNP        nicotine (NICODERM CQ) 21 MG/24HR 1 patch  1 patch Transdermal Daily JORDIN Hogan CNP   1 patch at 08/17/20 0952    LORazepam (ATIVAN) injection 1 mg  1 mg Intravenous Q6H PRN Francisco Ruiz MD   1 mg at 08/18/20 0512    morphine sulfate (PF) injection 4 mg  4 mg Intravenous Q4H PRN Saji Ashby MD   4 mg at 08/18/20 0511    hydrALAZINE (APRESOLINE) 10 mg in sodium chloride 0.9 % 50 mL ivpb  10 mg Intravenous Q4H PRN JORDIN Hogan CNP   Stopped at 08/15/20 0635    sodium chloride flush 0.9 % injection 10 mL  10 mL Intravenous 2 times per day Marcelino Galicia MD   10 mL at 08/17/20 2058    sodium chloride flush 0.9 % injection 10 mL  10 mL Intravenous PRN Marcelino Galicia MD   10 mL at 08/17/20 0402    acetaminophen (TYLENOL) tablet 650 mg  650 mg Oral Q6H PRN Marcelino Galicia MD        Or    acetaminophen (TYLENOL) suppository 650 mg  650 mg Rectal Q6H PRN Marcelino Galicia MD        magnesium hydroxide (MILK OF MAGNESIA) 400 MG/5ML suspension 30 mL  30 mL Oral Daily PRN Marcelino Galicia MD        promethazine (PHENERGAN) tablet 12.5 mg  12.5 mg Oral Q6H PRN Laura Moncada MD   12.5 mg at 08/17/20 1007    lactated ringers infusion   Intravenous Continuous Rosario Balderas  mL/hr at 08/18/20 0247      escitalopram (LEXAPRO) tablet 20 mg  20 mg Oral Daily Rosario Balderas MD   20 mg at 08/17/20 4962    levothyroxine (SYNTHROID) tablet 100 mcg  100 mcg Oral Daily Rosario Balderas MD   100 mcg at 08/17/20 0601    budesonide-formoterol (SYMBICORT) 160-4.5 MCG/ACT inhaler 2 puff  2 puff Inhalation BID Laura Moncada MD   2 puff at 08/17/20 2158    gabapentin (NEURONTIN) capsule 800 mg  800 mg Oral TID Laura Moncada MD   800 mg at 08/17/20 2058    cefTRIAXone (ROCEPHIN) 1 g IVPB in 50 mL D5W minibag  1 g Intravenous Q24H Laura Moncada MD   Stopped at 08/18/20 0541    metronidazole (FLAGYL) 500 mg in NaCl 100 mL IVPB premix  500 mg Intravenous Q8H Laura Moncada MD   Stopped at 08/18/20 0653    pantoprazole (PROTONIX) injection 40 mg  40 mg Intravenous Daily Rosario Balderas MD   40 mg at 08/17/20 1039    metoclopramide (REGLAN) injection 10 mg  10 mg Intravenous Q8H Rosario Balderas MD   10 mg at 08/18/20 0601    ondansetron (ZOFRAN) injection 4 mg  4 mg Intravenous Q8H Rosario Balderas MD   4 mg at 08/18/20 0601    sodium chloride flush 0.9 % injection 10 mL  10 mL Intravenous BID Laura Moncada MD   10 mL at 08/17/20 2058       Allergies:  No Known Allergies    Problem List:    Patient Active Problem List   Diagnosis Code    Colitis K52.9    Acute abdominal pain R10.9       Past Medical History:        Diagnosis Date    Amphetamine abuse ([facility] 2020    Cocaine abuse ([facility] 2020    NSTEMI (non-ST elevated myocardial infarction) ([facility] 04/2020       Past Surgical History:        Procedure Laterality Date    SIGMOIDOSCOPY N/A 8/13/2020    SIGMOIDOSCOPY BIOPSY FLEXIBLE performed by Laura Moncada MD at [address] [place] [address] Crossing History:    Social History     Tobacco Use    Smoking status: Current Every Day Smoker    Smokeless tobacco: Never Used   Substance Use Topics    results found for: PHART, PO2ART, OZE1KTB, GZC1ZQM, BEART, [id number]     Type & Screen (If Applicable):  No results found for: LABABO, LABRH    Drug/Infectious Status (If Applicable):  No results found for: HIV, HEPCAB    COVID-19 Screening (If Applicable): No results found for: COVID19      Anesthesia Evaluation  Patient summary reviewed  Airway: Mallampati: II  TM distance: >3 FB     Mouth opening: > = 3 FB Dental:    (+) edentulous      Pulmonary:normal exam    (+) current smoker                          ROS comment: Inhaler use   Cardiovascular:  Exercise tolerance: poor (<4 METS),   (+) past MI:,                   Neuro/Psych:                ROS comment: Metamphetamine and cocaine abuse  Marijuana and Opiates use  GI/Hepatic/Renal:             Endo/Other:    (+) hypothyroidism, blood dyscrasia: anticoagulation therapy:., .                 Abdominal:           Vascular:                                        Anesthesia Plan      general     ASA 4       Induction: intravenous.           Plan discussed with surgical team.                  JORDIN Harris CRNA   8/18/2020

## 2020-08-18 NOTE — PROGRESS NOTES
Hospitalist Progress Note      Name:  Ezra Ulloa /Age/Sex: 1960  (62 y.o. female)   MRN & CSN:  [id number] & [id number] Admission Date/Time: 2020  8:42 PM   Location:  OR/NONE PCP: Dahlia Villeda Day: 8    Assessment and Plan:   Ezra Ulloa is a 62 y.o.  female  who presents with <principal problem not specified>    > mid transverse colon stricture  CT scan with abrupt narrowing involving the splenic flexure/distal transverse colon with mucosal thickening and pericolonic stranding, caused bowel obstruction  GI evaluated, s/p colonoscopy. Not able to pass scope beyond stricture, recommended surgical resection-need outpatient follow-up for repeat C scope in 6 months. holding DAPT-continue on Integrilin by cardiology. - s/p Left hemicolectomy      >H/o amphetamine use     >CAD s/p recent PCI  - cardiology consulted, justin op management  Holding ASA, plavix for surgery  Increased dose of Coreg.     >Hypothyroidism  Continue Synthroid.     Hepatitis C  HLD  COPD     HTN-continue Norvasc, Coreg. BP higher side. Nicotine abuse    Diet Diet NPO Effective Now   DVT Prophylaxis ? Lovenox once ok by surgery   GI Prophylaxis ? PPI   Code Status Full Code   Disposition  pending surgery clearance. History of Present Illness:     Pt S&E.      limited due to pt' sedated post op     10-14 point ROS limited due to pt' sedated post op     Objective: Intake/Output Summary (Last 24 hours) at 2020 1251  Last data filed at 2020 1228  Gross per 24 hour   Intake 9503.58 ml   Output 600 ml   Net 8903.58 ml      Vitals:   Vitals:    20 0427   BP: (!) 132/98   Pulse: 66   Resp: 20   Temp: 98.4 °F (36.9 °C)   SpO2: 96%     Physical Exam:      GEN Awake female, somnolent post op , no apparent distress. RESP Decreased air sounds. Symmetric chest movement . CARDIO/VASC S1/S2 auscultated. Regular rate. GI Abdomen is soft , post op .    MSK Spontaneous movement of all extremities  SKIN Normal coloration, warm, dry. NEURO no lateralizing weakness. PSYCH Awake, alert, somnolent post op. Affect appropriate.     Medications:   Medications:    carvedilol  12.5 mg Oral BID WC    amLODIPine  10 mg Oral Daily    nicotine  1 patch Transdermal Daily    sodium chloride flush  10 mL Intravenous 2 times per day    escitalopram  20 mg Oral Daily    levothyroxine  100 mcg Oral Daily    budesonide-formoterol  2 puff Inhalation BID    gabapentin  800 mg Oral TID    cefTRIAXone (ROCEPHIN) IV  1 g Intravenous Q24H    metroNIDAZOLE  500 mg Intravenous Q8H    pantoprazole  40 mg Intravenous Daily    metoclopramide  10 mg Intravenous Q8H    ondansetron  4 mg Intravenous Q8H    sodium chloride flush  10 mL Intravenous BID      Infusions:    lactated ringers 100 mL/hr at 08/18/20 0247     PRN Meds: LORazepam, 1 mg, Q6H PRN  morphine, 4 mg, Q4H PRN  hydrALAZINE (APRESOLINE) ivpb, 10 mg, Q4H PRN  sodium chloride flush, 10 mL, PRN  acetaminophen, 650 mg, Q6H PRN    Or  acetaminophen, 650 mg, Q6H PRN  magnesium hydroxide, 30 mL, Daily PRN  promethazine, 12.5 mg, Q6H PRN          Electronically signed by Nahomy Medellin MD on 8/18/2020 at 12:51 PM

## 2020-08-18 NOTE — PLAN OF CARE
Problem: Pain:  Goal: Pain level will decrease  Description: Pain level will decrease  Outcome: Ongoing  Goal: Control of acute pain  Description: Control of acute pain  Outcome: Ongoing  Goal: Control of chronic pain  Description: Control of chronic pain  Outcome: Ongoing  Goal: Patient's pain/discomfort is manageable  Description: Patient's pain/discomfort is manageable  Outcome: Ongoing     Problem: Infection:  Goal: Will remain free from infection  Description: Will remain free from infection  Outcome: Ongoing     Problem: Safety:  Goal: Free from accidental physical injury  Description: Free from accidental physical injury  Outcome: Ongoing  Goal: Free from intentional harm  Description: Free from intentional harm  Outcome: Ongoing     Problem: Daily Care:  Goal: Daily care needs are met  Description: Daily care needs are met  Outcome: Ongoing     Problem: Skin Integrity:  Goal: Skin integrity will stabilize  Description: Skin integrity will stabilize  Outcome: Ongoing     Problem: Discharge Planning:  Goal: Patients continuum of care needs are met  Description: Patients continuum of care needs are met  Outcome: Ongoing     Problem: Nutritional:  Goal: Nutritional status will improve  Description: Nutritional status will improve  Outcome: Ongoing  Goal: Ability to follow a diet with enough fiber (20 to 30 grams) for normal bowel function will improve  Description: Ability to follow a diet with enough fiber (20 to 30 grams) for normal bowel function will improve  Outcome: Ongoing     Problem: Physical Regulation:  Goal: Will remain free from infection  Description: Will remain free from infection  Outcome: Ongoing  Goal: Diagnostic test results will improve  Description: Diagnostic test results will improve  Outcome: Ongoing  Goal: Ability to maintain vital signs within normal range will improve  Description: Ability to maintain vital signs within normal range will improve  Outcome: Ongoing  Goal: improve  Description: Ability to achieve a regular elimination pattern will improve  Outcome: Ongoing     Problem: Fluid Volume:  Goal: Maintenance of adequate hydration will improve  Description: Maintenance of adequate hydration will improve  Outcome: Ongoing     Problem: Health Behavior:  Goal: Ability to state signs and symptoms to report to health care provider will improve  Description: Ability to state signs and symptoms to report to health care provider will improve  Outcome: Ongoing     Problem: Sensory:  Goal: Pain level will decrease  Description: Pain level will decrease  Outcome: Ongoing  Goal: Ability to identify factors that increase the pain will improve  Description: Ability to identify factors that increase the pain will improve  Outcome: Ongoing  Goal: Ability to notify healthcare provider of pain before it becomes unmanageable or unbearable will improve  Description: Ability to notify healthcare provider of pain before it becomes unmanageable or unbearable will improve  Outcome: Ongoing     Problem: Falls - Risk of:  Goal: Will remain free from falls  Description: Will remain free from falls  Outcome: Ongoing  Goal: Absence of physical injury  Description: Absence of physical injury  Outcome: Ongoing

## 2020-08-18 NOTE — BRIEF OP NOTE
Dictated     Job ID [id number]    Pre op dx: sigmoid stricture    Post op dx: mid transverse colon stricture    Procedures:    1. Left hemicolectomy, extended  2. Mobilization of splenic flexure  3. Side by side anastomosis   4.  Rigid proctoscopy       URIEL DAUGHERTY, II

## 2020-08-19 LAB
ANION GAP SERPL CALCULATED.3IONS-SCNC: 11 MMOL/L (ref 4–16)
BASOPHILS ABSOLUTE: 0 K/CU MM
BASOPHILS RELATIVE PERCENT: 0.2 % (ref 0–1)
BUN BLDV-MCNC: 6 MG/DL (ref 6–23)
CALCIUM SERPL-MCNC: 8.4 MG/DL (ref 8.3–10.6)
CHLORIDE BLD-SCNC: 98 MMOL/L (ref 99–110)
CO2: 23 MMOL/L (ref 21–32)
CREAT SERPL-MCNC: 0.6 MG/DL (ref 0.6–1.1)
DIFFERENTIAL TYPE: ABNORMAL
EOSINOPHILS ABSOLUTE: 0 K/CU MM
EOSINOPHILS RELATIVE PERCENT: 0.1 % (ref 0–3)
GFR AFRICAN AMERICAN: >60 ML/MIN/1.73M2
GFR NON-AFRICAN AMERICAN: >60 ML/MIN/1.73M2
GLUCOSE BLD-MCNC: 89 MG/DL (ref 70–99)
HCT VFR BLD CALC: 40.3 % (ref 37–47)
HEMOGLOBIN: 13.2 GM/DL (ref 12.5–16)
IMMATURE NEUTROPHIL %: 0.6 % (ref 0–0.43)
LYMPHOCYTES ABSOLUTE: 2.5 K/CU MM
LYMPHOCYTES RELATIVE PERCENT: 15.8 % (ref 24–44)
MCH RBC QN AUTO: 29.9 PG (ref 27–31)
MCHC RBC AUTO-ENTMCNC: 32.8 % (ref 32–36)
MCV RBC AUTO: 91.2 FL (ref 78–100)
MONOCYTES ABSOLUTE: 1.3 K/CU MM
MONOCYTES RELATIVE PERCENT: 8 % (ref 0–4)
NUCLEATED RBC %: 0 %
PDW BLD-RTO: 12.2 % (ref 11.7–14.9)
PLATELET # BLD: 443 K/CU MM (ref 140–440)
PMV BLD AUTO: 8 FL (ref 7.5–11.1)
POTASSIUM SERPL-SCNC: 4.6 MMOL/L (ref 3.5–5.1)
RBC # BLD: 4.42 M/CU MM (ref 4.2–5.4)
SEGMENTED NEUTROPHILS ABSOLUTE COUNT: 12.1 K/CU MM
SEGMENTED NEUTROPHILS RELATIVE PERCENT: 75.3 % (ref 36–66)
SODIUM BLD-SCNC: 132 MMOL/L (ref 135–145)
TOTAL IMMATURE NEUTOROPHIL: 0.1 K/CU MM
TOTAL NUCLEATED RBC: 0 K/CU MM
WBC # BLD: 16 K/CU MM (ref 4–10.5)

## 2020-08-19 PROCEDURE — 6370000000 HC RX 637 (ALT 250 FOR IP): Performed by: SURGERY

## 2020-08-19 PROCEDURE — 6360000002 HC RX W HCPCS: Performed by: SURGERY

## 2020-08-19 PROCEDURE — C9113 INJ PANTOPRAZOLE SODIUM, VIA: HCPCS | Performed by: INTERNAL MEDICINE

## 2020-08-19 PROCEDURE — 2580000003 HC RX 258: Performed by: INTERNAL MEDICINE

## 2020-08-19 PROCEDURE — 94761 N-INVAS EAR/PLS OXIMETRY MLT: CPT

## 2020-08-19 PROCEDURE — 80048 BASIC METABOLIC PNL TOTAL CA: CPT

## 2020-08-19 PROCEDURE — 2500000003 HC RX 250 WO HCPCS: Performed by: INTERNAL MEDICINE

## 2020-08-19 PROCEDURE — 6360000002 HC RX W HCPCS: Performed by: INTERNAL MEDICINE

## 2020-08-19 PROCEDURE — APPNB45 APP NON BILLABLE 31-45 MINUTES: Performed by: NURSE PRACTITIONER

## 2020-08-19 PROCEDURE — 6370000000 HC RX 637 (ALT 250 FOR IP): Performed by: NURSE PRACTITIONER

## 2020-08-19 PROCEDURE — 2700000000 HC OXYGEN THERAPY PER DAY

## 2020-08-19 PROCEDURE — 6370000000 HC RX 637 (ALT 250 FOR IP): Performed by: INTERNAL MEDICINE

## 2020-08-19 PROCEDURE — 85025 COMPLETE CBC W/AUTO DIFF WBC: CPT

## 2020-08-19 PROCEDURE — 99024 POSTOP FOLLOW-UP VISIT: CPT | Performed by: SURGERY

## 2020-08-19 PROCEDURE — 99232 SBSQ HOSP IP/OBS MODERATE 35: CPT | Performed by: INTERNAL MEDICINE

## 2020-08-19 PROCEDURE — 2580000003 HC RX 258: Performed by: SURGERY

## 2020-08-19 PROCEDURE — 94640 AIRWAY INHALATION TREATMENT: CPT

## 2020-08-19 PROCEDURE — 36415 COLL VENOUS BLD VENIPUNCTURE: CPT

## 2020-08-19 PROCEDURE — 2140000000 HC CCU INTERMEDIATE R&B

## 2020-08-19 PROCEDURE — 2500000003 HC RX 250 WO HCPCS: Performed by: SURGERY

## 2020-08-19 RX ORDER — MORPHINE SULFATE 2 MG/ML
2 INJECTION, SOLUTION INTRAMUSCULAR; INTRAVENOUS
Status: DISCONTINUED | OUTPATIENT
Start: 2020-08-19 | End: 2020-08-22

## 2020-08-19 RX ORDER — CLOPIDOGREL BISULFATE 75 MG/1
75 TABLET ORAL DAILY
Status: DISCONTINUED | OUTPATIENT
Start: 2020-08-20 | End: 2020-08-24 | Stop reason: HOSPADM

## 2020-08-19 RX ORDER — CARVEDILOL 25 MG/1
25 TABLET ORAL 2 TIMES DAILY WITH MEALS
Status: DISCONTINUED | OUTPATIENT
Start: 2020-08-19 | End: 2020-08-24 | Stop reason: HOSPADM

## 2020-08-19 RX ORDER — ASPIRIN 81 MG/1
81 TABLET, CHEWABLE ORAL DAILY
Status: DISCONTINUED | OUTPATIENT
Start: 2020-08-20 | End: 2020-08-24 | Stop reason: HOSPADM

## 2020-08-19 RX ORDER — MORPHINE SULFATE 4 MG/ML
4 INJECTION, SOLUTION INTRAMUSCULAR; INTRAVENOUS
Status: DISCONTINUED | OUTPATIENT
Start: 2020-08-19 | End: 2020-08-22

## 2020-08-19 RX ADMIN — MORPHINE SULFATE 4 MG: 4 INJECTION, SOLUTION INTRAMUSCULAR; INTRAVENOUS at 18:02

## 2020-08-19 RX ADMIN — ONDANSETRON 4 MG: 2 INJECTION INTRAMUSCULAR; INTRAVENOUS at 13:22

## 2020-08-19 RX ADMIN — LORAZEPAM 1 MG: 2 INJECTION INTRAMUSCULAR; INTRAVENOUS at 05:19

## 2020-08-19 RX ADMIN — BUDESONIDE AND FORMOTEROL FUMARATE DIHYDRATE 2 PUFF: 160; 4.5 AEROSOL RESPIRATORY (INHALATION) at 08:36

## 2020-08-19 RX ADMIN — GABAPENTIN 800 MG: 400 CAPSULE ORAL at 13:21

## 2020-08-19 RX ADMIN — LORAZEPAM 1 MG: 2 INJECTION INTRAMUSCULAR; INTRAVENOUS at 21:41

## 2020-08-19 RX ADMIN — SODIUM CHLORIDE, POTASSIUM CHLORIDE, SODIUM LACTATE AND CALCIUM CHLORIDE: 600; 310; 30; 20 INJECTION, SOLUTION INTRAVENOUS at 06:14

## 2020-08-19 RX ADMIN — PROMETHAZINE HYDROCHLORIDE 12.5 MG: 25 TABLET ORAL at 18:03

## 2020-08-19 RX ADMIN — ONDANSETRON 4 MG: 2 INJECTION INTRAMUSCULAR; INTRAVENOUS at 06:14

## 2020-08-19 RX ADMIN — GABAPENTIN 800 MG: 400 CAPSULE ORAL at 09:38

## 2020-08-19 RX ADMIN — METOCLOPRAMIDE 10 MG: 5 INJECTION, SOLUTION INTRAMUSCULAR; INTRAVENOUS at 06:14

## 2020-08-19 RX ADMIN — CEFTRIAXONE 1 G: 1 INJECTION, POWDER, FOR SOLUTION INTRAMUSCULAR; INTRAVENOUS at 05:17

## 2020-08-19 RX ADMIN — MORPHINE SULFATE 4 MG: 4 INJECTION, SOLUTION INTRAMUSCULAR; INTRAVENOUS at 22:22

## 2020-08-19 RX ADMIN — PANTOPRAZOLE SODIUM 40 MG: 40 INJECTION, POWDER, FOR SOLUTION INTRAVENOUS at 09:37

## 2020-08-19 RX ADMIN — MORPHINE SULFATE 4 MG: 4 INJECTION, SOLUTION INTRAMUSCULAR; INTRAVENOUS at 05:19

## 2020-08-19 RX ADMIN — METRONIDAZOLE 500 MG: 500 INJECTION, SOLUTION INTRAVENOUS at 06:14

## 2020-08-19 RX ADMIN — GABAPENTIN 800 MG: 400 CAPSULE ORAL at 21:40

## 2020-08-19 RX ADMIN — CARVEDILOL 12.5 MG: 12.5 TABLET, FILM COATED ORAL at 09:39

## 2020-08-19 RX ADMIN — SODIUM CHLORIDE, PRESERVATIVE FREE 10 ML: 5 INJECTION INTRAVENOUS at 09:37

## 2020-08-19 RX ADMIN — AMLODIPINE BESYLATE 10 MG: 10 TABLET ORAL at 09:38

## 2020-08-19 RX ADMIN — LORAZEPAM 1 MG: 2 INJECTION INTRAMUSCULAR; INTRAVENOUS at 13:22

## 2020-08-19 RX ADMIN — MORPHINE SULFATE 4 MG: 4 INJECTION, SOLUTION INTRAMUSCULAR; INTRAVENOUS at 01:18

## 2020-08-19 RX ADMIN — METOCLOPRAMIDE 10 MG: 5 INJECTION, SOLUTION INTRAMUSCULAR; INTRAVENOUS at 13:23

## 2020-08-19 RX ADMIN — MORPHINE SULFATE 4 MG: 4 INJECTION, SOLUTION INTRAMUSCULAR; INTRAVENOUS at 13:22

## 2020-08-19 RX ADMIN — PROMETHAZINE HYDROCHLORIDE 12.5 MG: 25 TABLET ORAL at 09:38

## 2020-08-19 RX ADMIN — METRONIDAZOLE 500 MG: 500 INJECTION, SOLUTION INTRAVENOUS at 13:22

## 2020-08-19 RX ADMIN — LEVOTHYROXINE SODIUM 100 MCG: 100 TABLET ORAL at 06:14

## 2020-08-19 RX ADMIN — METRONIDAZOLE 500 MG: 500 INJECTION, SOLUTION INTRAVENOUS at 21:41

## 2020-08-19 RX ADMIN — ONDANSETRON 4 MG: 2 INJECTION INTRAMUSCULAR; INTRAVENOUS at 22:22

## 2020-08-19 RX ADMIN — SODIUM CHLORIDE, POTASSIUM CHLORIDE, SODIUM LACTATE AND CALCIUM CHLORIDE: 600; 310; 30; 20 INJECTION, SOLUTION INTRAVENOUS at 18:04

## 2020-08-19 RX ADMIN — MORPHINE SULFATE 4 MG: 4 INJECTION, SOLUTION INTRAMUSCULAR; INTRAVENOUS at 09:37

## 2020-08-19 RX ADMIN — METOCLOPRAMIDE 10 MG: 5 INJECTION, SOLUTION INTRAMUSCULAR; INTRAVENOUS at 21:41

## 2020-08-19 RX ADMIN — CARVEDILOL 25 MG: 25 TABLET, FILM COATED ORAL at 18:02

## 2020-08-19 ASSESSMENT — PAIN DESCRIPTION - LOCATION
LOCATION: ABDOMEN

## 2020-08-19 ASSESSMENT — PAIN DESCRIPTION - PROGRESSION
CLINICAL_PROGRESSION: RESOLVED
CLINICAL_PROGRESSION: RESOLVED
CLINICAL_PROGRESSION: GRADUALLY WORSENING
CLINICAL_PROGRESSION: RESOLVED
CLINICAL_PROGRESSION: NOT CHANGED
CLINICAL_PROGRESSION: RESOLVED

## 2020-08-19 ASSESSMENT — PAIN DESCRIPTION - DESCRIPTORS
DESCRIPTORS: ACHING;SORE
DESCRIPTORS: ACHING;SHARP;SORE
DESCRIPTORS: ACHING
DESCRIPTORS: ACHING
DESCRIPTORS: ACHING;SORE;SHARP

## 2020-08-19 ASSESSMENT — PAIN DESCRIPTION - ORIENTATION
ORIENTATION: LOWER;MID
ORIENTATION: MID
ORIENTATION: MID
ORIENTATION: MID;LOWER
ORIENTATION: LOWER;MID

## 2020-08-19 ASSESSMENT — ENCOUNTER SYMPTOMS
EYES NEGATIVE: 1
STRIDOR: 0
NAUSEA: 0
WHEEZING: 0
ALLERGIC/IMMUNOLOGIC NEGATIVE: 1
SHORTNESS OF BREATH: 0
RESPIRATORY NEGATIVE: 1
ABDOMINAL PAIN: 1

## 2020-08-19 ASSESSMENT — PAIN SCALES - GENERAL
PAINLEVEL_OUTOF10: 10

## 2020-08-19 ASSESSMENT — PAIN DESCRIPTION - PAIN TYPE
TYPE: SURGICAL PAIN
TYPE: ACUTE PAIN
TYPE: SURGICAL PAIN
TYPE: SURGICAL PAIN
TYPE: ACUTE PAIN

## 2020-08-19 ASSESSMENT — PAIN DESCRIPTION - FREQUENCY
FREQUENCY: CONTINUOUS

## 2020-08-19 ASSESSMENT — PAIN - FUNCTIONAL ASSESSMENT
PAIN_FUNCTIONAL_ASSESSMENT: PREVENTS OR INTERFERES SOME ACTIVE ACTIVITIES AND ADLS
PAIN_FUNCTIONAL_ASSESSMENT: PREVENTS OR INTERFERES SOME ACTIVE ACTIVITIES AND ADLS

## 2020-08-19 ASSESSMENT — PAIN DESCRIPTION - ONSET
ONSET: ON-GOING
ONSET: ON-GOING

## 2020-08-19 NOTE — PROGRESS NOTES
Comments: +RUDOLPH with s/s output   Genitourinary:     Comments: + reema    Musculoskeletal:         General: No swelling. Skin:     General: Skin is warm. Neurological:      General: No focal deficit present. Mental Status: She is alert. Psychiatric:         Mood and Affect: Mood normal.           Scheduled Meds:   carvedilol  12.5 mg Oral BID WC    amLODIPine  10 mg Oral Daily    nicotine  1 patch Transdermal Daily    sodium chloride flush  10 mL Intravenous 2 times per day    escitalopram  20 mg Oral Daily    levothyroxine  100 mcg Oral Daily    budesonide-formoterol  2 puff Inhalation BID    gabapentin  800 mg Oral TID    cefTRIAXone (ROCEPHIN) IV  1 g Intravenous Q24H    metroNIDAZOLE  500 mg Intravenous Q8H    pantoprazole  40 mg Intravenous Daily    metoclopramide  10 mg Intravenous Q8H    ondansetron  4 mg Intravenous Q8H    sodium chloride flush  10 mL Intravenous BID     ContinuousInfusions:   lactated ringers 100 mL/hr at 08/19/20 0614     PRN Meds:HYDROmorphone, HYDROmorphone, fentanNYL, fentanNYL, labetalol, hydrALAZINE, LORazepam, morphine, hydrALAZINE (APRESOLINE) ivpb, sodium chloride flush, acetaminophen **OR** acetaminophen, magnesium hydroxide, promethazine **OR** [DISCONTINUED] ondansetron      Labs/Imaging Results:   Lab Results   Component Value Date    WBC 10.8 (H) 08/12/2020    HGB 12.6 08/15/2020    HCT 39.4 08/15/2020    MCV 92.6 08/12/2020     08/12/2020     Lab Results   Component Value Date     08/12/2020    K 4.1 08/12/2020     08/12/2020    CO2 22 08/12/2020    BUN 10 08/12/2020    CREATININE 0.5 (L) 08/12/2020    GLUCOSE 126 (H) 08/12/2020    CALCIUM 8.2 (L) 08/12/2020    PROT 6.2 (L) 08/12/2020    LABALBU 2.8 (L) 08/12/2020    BILITOT 0.2 08/12/2020    ALKPHOS 95 08/12/2020    AST 40 (H) 08/12/2020    ALT 30 08/12/2020    LABGLOM >60 08/12/2020    GFRAA >60 08/12/2020       Assessment:     61 y/o F POD 1 s/p:    1.  Open extended left

## 2020-08-19 NOTE — PROGRESS NOTES
Patient asking for pain medication; informed patient that next available dose of pain medication can be given to her at 0117. Patient became enraged; asking why I cannot administer Fentanyl every 5 minutes as the nurses were permitted to in PACU. I explained to patient that those orders are very specific for PACU and the time immediately after surgery. Patient demanding that I call Dr. Adelaide Morrell and Karishma Henderson him up\" to have him put q5 min Fentanyl orders in place for her \"recovery on this floor\". Informed I the patient that I would not be calling Dr. Adelaide Morrell and requesting those orders. Patient states she is an addict and that \"I would not understand\". Patient is falling asleep in the middle of her sentences; unable to stay awake for duration of conversation. Patient was offered Tylenol, an ice pack, and heating pad and refused all 3. Assisted patient with repositioning in the bed per her request, patient was asleep with eyes closed when I exited the room. Will continue to assess and monitor.

## 2020-08-19 NOTE — OP NOTE
[address] [facility], [place]               [address] [facility], [address] [facility]                                OPERATIVE REPORT    PATIENT NAME: Junita Dancer                        :        1960  MED REC NO:   [id number]                          ROOM:       8624  ACCOUNT NO:   [de-identified]                           ADMIT DATE: 2020  PROVIDER:     Lara Leslie II, MD    DATE OF PROCEDURE:  2020    PREOPERATIVE DIAGNOSIS:  Left-sided colon stricture. POSTOPERATIVE DIAGNOSIS:  Stricture just proximal to the splenic  flexure. PROCEDURE PERFORMED:  1. Exploratory laparotomy. 2.  Extended left hemicolectomy. 3.  Mobilization of splenic flexure. 4.  Primary telz-qo-cqtl anastomosis. 5.  Rigid proctoscopy. SURGEON:  Austin Hay MD    ASSISTANT:  Scott Lott    ANESTHESIA:  General endotracheal.    IV FLUIDS:  1500 mL. ESTIMATED BLOOD LOSS:  100 mL. TUBES/LINES/DRAINS:  19-[place] drain in the left lower quadrant  around the anastomosis and Clay catheter as well as NG tube. SPECIMENS:  Extended left colon. COMPLICATIONS:  None apparent. INDICATIONS FOR PROCEDURE:  The patient is a 60-year-old female who came  into the emergency room about a week ago with a large bowel obstruction. She was worked up by Medicine, Gastroenterology, and General Surgery. The patient was found on endoscopy to have a stricture which was  tattooed and biopsied. The biopsy came back as benign. The  patient had recent cardiac stent placed and was on antiplatelets. After  consultation with the cardiologist, the patient was bridged with an  Integrilin drip. The patient underwent a bowel prep. Risks, benefits,  and alternatives were discussed in detail with the patient and she  agreed to undergo resection.     DESCRIPTION OF PROCEDURE:  On 2020, the patient was met in the  preoperative holding area where the risks, benefits, and alternatives  were once again discussed in detail with the patient. She agreed to  proceed. She was transferred to the operating room. Once in the  operating room, she was placed onto the operating room table in supine  position and general anesthesia was induced without complications. The  patient was secured to the operating room table in multiple locations  and all pressure points were well padded. After induction of anesthesia, the patient was transformed into the  modified lithotomy position with careful attention to all pressure points. The patient had a Clay catheter placed  under sterile technique with clear return of yellow urine on the first  attempt. The patient had scheduled antibiotics in accordance with national  protocol. A [facility]-approved time-out was held by all members of the operating team  present and in agreement. The abdomen was prepped and draped in a standard sterile fashion. A vertical lower midline incision was made on the lower abdomen. This  was carried down to the fascia with electrocautery with careful  attention to hemostasis. The fascia was identified, grasped and  incised. The safe entry into the patient's abdomen was accomplished. The fascia was opened the length of the incision. A Bookwalter was brought onto the field and adequate exposure was obtained by gently packing the small bowel out of the operating field of view. A general inspection of the patient's abdomen was completed and it was  found that there was a visual tattooing of the left colon near the  splenic flexure. However, upon palpating the colon, the stricture was  actually proximal to this tattooing. There were some adhesive attachments from the omentum to the colon in  that same area. There were no other abnormalities appreciated or  Palpated throughout the abdomen.     The dissection was begun by mobilization of the left colon in a standard  fashion along the white line of Toldt in the bloodless plane. This was  accomplished up to the spleen and then the splenic flexure was mobilized  in a standard fashion. Of note, the ureter was identified and protected  throughout the course of the surgery. The transverse colon was retracted cephalad and the middle colic vessels  were identified and a proximal margin was identified that was  sufficiently proximal to the stricture. The colon was divided here with  an Ethicon [place] green load. This required two firings. Next the  mesentery was scored and taken down with a LigaSure. The attention was then directed towards the sigmoid colon. The distal  dissection margin was identified and it was divided in a similar fashion  with a green load of the Ethicon [place] stapler. Prior to dividing the  sigmoid, the ureter was once again identified and protected. The  remaining mesentery was taken down with the LigaSure. The specimen was  passed off the table. The surgical field was irrigated and found to be hemostatic. The proximal transverse colon was pink and viable throughout the case. The distal colon was also pink and viable throughout the case. Next the  colon ends were brought in close juxtaposition and there was found to be  no tension with adequate overlap. The tadc-ro-ebuv functional end-to-end anastomosis was then created in a standard fashion. Multiple 3-0 silk stay sutures were placed. Two colotomies were made and then  the common channel was created by firing a blue load of the Ethicon  [place] 60 stapler. Prior to closing the common colotomy, visualization  was done to ensure that there was no bleeding and that the common  channel was patent which it was. The common channel was then closed using two layers, both with 3-0  Stratafix. Additionally Evicel was placed and omentum was draped over  the anastomosis.     The pelvis was filled with fluid and a rigid proctoscopy was undertaken  in which an air leak test was done and there was found to be no leak. The fluid was suctioned clean and a 19-Tajik RUDOLPH was placed and brought  out through the left lower quadrant. This was secured using a 3-0 nylon  suture. The patient's abdomen was then closed in a standard fashion with #1  Stratafix to close the fascia, a 3-0 Vicryl to close the deep dermal  layers, and then staples for the skin. An appropriate dressing was  placed. At the end of the case, the surgical count, instrument count, and sponge  count were correct x2. At the end of the case, the patient was extubated and transported to the  PACU in stable condition. Dr. Candace Zaragoza was present and supervised and scrubbed for the entire  duration of the case.         Indra Lyons MD    D: 08/18/2020 12:40:53       T: 08/18/2020 12:49:53     [email or web address]  Job#: [id number]     Doc#: [id number]    CC:

## 2020-08-19 NOTE — PLAN OF CARE
Problem: Pain:  Goal: Pain level will decrease  Description: Pain level will decrease  Outcome: Not Met This Shift  Goal: Control of acute pain  Description: Control of acute pain  Outcome: Not Met This Shift  Goal: Control of chronic pain  Description: Control of chronic pain  Outcome: Not Met This Shift  Goal: Patient's pain/discomfort is manageable  Description: Patient's pain/discomfort is manageable  Outcome: Not Met This Shift     Problem: Infection:  Goal: Will remain free from infection  Description: Will remain free from infection  Outcome: Ongoing     Problem: Safety:  Goal: Free from accidental physical injury  Description: Free from accidental physical injury  Outcome: Met This Shift  Goal: Free from intentional harm  Description: Free from intentional harm  Outcome: Met This Shift     Problem: Daily Care:  Goal: Daily care needs are met  Description: Daily care needs are met  Outcome: Met This Shift     Problem: Skin Integrity:  Goal: Skin integrity will stabilize  Description: Skin integrity will stabilize  Outcome: Ongoing     Problem: Discharge Planning:  Goal: Patients continuum of care needs are met  Description: Patients continuum of care needs are met  Outcome: Ongoing     Problem: Nutritional:  Goal: Nutritional status will improve  Description: Nutritional status will improve  Outcome: Not Met This Shift  Goal: Ability to follow a diet with enough fiber (20 to 30 grams) for normal bowel function will improve  Description: Ability to follow a diet with enough fiber (20 to 30 grams) for normal bowel function will improve  Outcome: Not Met This Shift     Problem: Physical Regulation:  Goal: Will remain free from infection  Description: Will remain free from infection  Outcome: Ongoing  Goal: Diagnostic test results will improve  Description: Diagnostic test results will improve  Outcome: Ongoing  Goal: Ability to maintain vital signs within normal range will improve  Description: Ability to maintain vital signs within normal range will improve  Outcome: Ongoing  Goal: Complications related to the disease process, condition or treatment will be avoided or minimized  Description: Complications related to the disease process, condition or treatment will be avoided or minimized  Outcome: Ongoing  Goal: Ability to maintain clinical measurements within normal limits will improve  Description: Ability to maintain clinical measurements within normal limits will improve  Outcome: Ongoing     Problem: Respiratory:  Goal: Ability to maintain normal respiratory secretions will improve  Description: Ability to maintain normal respiratory secretions will improve  Outcome: Met This Shift     Problem: Skin Integrity:  Goal: Demonstration of wound healing without infection will improve  Description: Demonstration of wound healing without infection will improve  Outcome: Ongoing  Goal: Complications related to intravenous access or infusion will be avoided or minimized  Description: Complications related to intravenous access or infusion will be avoided or minimized  Outcome: Met This Shift  Goal: Risk for impaired skin integrity will decrease  Description: Risk for impaired skin integrity will decrease  Outcome: Ongoing     Problem: Activity:  Goal: Risk for activity intolerance will decrease  Description: Risk for activity intolerance will decrease  Outcome: Not Met This Shift     Problem:  Bowel/Gastric:  Goal: Diagnostic test results will improve  Description: Diagnostic test results will improve  Outcome: Ongoing  Goal: Bowel function will improve  Description: Bowel function will improve  Outcome: Not Met This Shift  Goal: Occurrences of nausea will decrease  Description: Occurrences of nausea will decrease  Outcome: Met This Shift  Goal: Occurrences of vomiting will decrease  Description: Occurrences of vomiting will decrease  Outcome: Met This Shift  Goal: Control of bowel function will improve  Description: Control of bowel function will improve  Outcome: Ongoing  Goal: Ability to achieve a regular elimination pattern will improve  Description: Ability to achieve a regular elimination pattern will improve  Outcome: Not Met This Shift     Problem: Fluid Volume:  Goal: Maintenance of adequate hydration will improve  Description: Maintenance of adequate hydration will improve  Outcome: Ongoing     Problem: Health Behavior:  Goal: Ability to state signs and symptoms to report to health care provider will improve  Description: Ability to state signs and symptoms to report to health care provider will improve  Outcome: Ongoing     Problem: Sensory:  Goal: Pain level will decrease  Description: Pain level will decrease  Outcome: Not Met This Shift  Goal: Ability to identify factors that increase the pain will improve  Description: Ability to identify factors that increase the pain will improve  Outcome: Met This Shift  Goal: Ability to notify healthcare provider of pain before it becomes unmanageable or unbearable will improve  Description: Ability to notify healthcare provider of pain before it becomes unmanageable or unbearable will improve  Outcome: Met This Shift     Problem: Falls - Risk of:  Goal: Will remain free from falls  Description: Will remain free from falls  Outcome: Met This Shift  Goal: Absence of physical injury  Description: Absence of physical injury  Outcome: Met This Shift

## 2020-08-19 NOTE — PROGRESS NOTES
Respiratory: Negative for shortness of breath, wheezing and stridor. Cardiovascular: Negative for chest pain, palpitations and leg swelling. Gastrointestinal: Positive for abdominal pain. Negative for nausea. Neurological: Positive for weakness. All other systems reviewed and are negative. BP (!) 152/97   Pulse 96   Temp 98.9 °F (37.2 °C) (Oral)   Resp 18   Ht 5' 2\" (1.575 m)   Wt 163 lb 5.8 oz (74.1 kg)   SpO2 96%   BMI 29.88 kg/m²       Intake/Output Summary (Last 24 hours) at 8/19/2020 1149  Last data filed at 8/19/2020 1467  Gross per 24 hour   Intake 2796.74 ml   Output 1685 ml   Net 1111.74 ml       Physical Exam:  Constitutional:  Well developed, Well nourished, No acute distress  HENT:  Normocephalic, Atraumatic, Bilateral external ears normal,  Nose normal.   Neck- trachea is midline  Eyes:  PERRL, Conjunctiva normal  Respiratory:  Normal breath sounds, No respiratory distress, No wheezing, No chest tenderness. Cardiovascular:  Normal heart rate, Normal rhythm, no murmurs appreciated, No rubs appreciated, No gallops appreciated, JVP not elevated  Abdomen/GI:  Soft, No tenderness  Musculoskeletal:  Intact distal pulses, no edema, No tenderness, No cyanosis, No clubbing. Integument:  Warm, Dry  Lymphatic:  No lymphadenopathy noted.    Neurologic:  Alert & oriented  Psychiatric:  Affect and Mood :pleasant     Medications:    [START ON 8/20/2020] clopidogrel  75 mg Oral Daily    [START ON 8/20/2020] aspirin  81 mg Oral Daily    carvedilol  12.5 mg Oral BID     amLODIPine  10 mg Oral Daily    nicotine  1 patch Transdermal Daily    sodium chloride flush  10 mL Intravenous 2 times per day    escitalopram  20 mg Oral Daily    levothyroxine  100 mcg Oral Daily    budesonide-formoterol  2 puff Inhalation BID    gabapentin  800 mg Oral TID    cefTRIAXone (ROCEPHIN) IV  1 g Intravenous Q24H    metroNIDAZOLE  500 mg Intravenous Q8H    pantoprazole  40 mg Intravenous Daily    metoclopramide  10 mg Intravenous Q8H    ondansetron  4 mg Intravenous Q8H    sodium chloride flush  10 mL Intravenous BID      lactated ringers 100 mL/hr at 08/19/20 1009     morphine **OR** morphine, LORazepam, morphine, hydrALAZINE (APRESOLINE) ivpb, sodium chloride flush, acetaminophen **OR** acetaminophen, magnesium hydroxide, promethazine **OR** [DISCONTINUED] ondansetron    Lab Data:  CBC:   Recent Labs     08/19/20  0818   WBC 16.0*   HGB 13.2   HCT 40.3   MCV 91.2   *     BMP: No results for input(s): NA, K, CL, CO2, PHOS, BUN, CREATININE in the last 72 hours. Invalid input(s): CA  PT/INR: No results for input(s): PROTIME, INR in the last 72 hours. BNP:  No results for input(s): PROBNP in the last 72 hours. TROPONIN: No results for input(s): TROPONINT in the last 72 hours. Impression:  Active Problems:    Acute abdominal pain    Severe malnutrition (HCC)    Generalized abdominal pain  Resolved Problems:    * No resolved hospital problems. *       All labs, medications and tests reviewed by myself, continue all other medications of all above medical condition listed as is except for changes mentioned above. Thank you   Please call with questions. Electronically signed by JORDIN Bui CNP on 8/19/2020 at 11:49 AM   CARDIOLOGY ATTENDING ADDENDUM    I have seen ,spoken to  and examined this patient personally, independently of the nurse practitioner. I have reviewed the hospital care given to date and reviewed all pertinent labs and imaging. The plan was developed mutually at the time of the visit with the patient,  NP   and myself. I have spoken with patient, nursing staff and provided written and verbal instructions . The above note has been reviewed and I agree with the assessment, diagnosis, and treatment plan with changes made by me as follows     HPI:  I have reviewed the above HPI  And agree with above   Kary Das is a 62 y. o.year old who and presents with had concerns including Rectal Bleeding (on plavix, hx of hemrrhoids, states \"for a couple months\" ); Constipation (states been 1 week); Abdominal Pain (\"all over pain\", started today ); and Hypotension (92/68 upon arrival ). Chief Complaint   Patient presents with    Rectal Bleeding     on plavix, hx of hemrrhoids, states \"for a couple months\"     Constipation     states been 1 week    Abdominal Pain     \"all over pain\", started today     Hypotension     92/68 upon arrival      Please review addendum/changes made to note above   Interval history:  Ng tube in place , post surgery    Physical Exam:  General:  Awake, alert, NAD  Head:normal  Eye:normal  Neck:  No JVD   Chest:  Clear to auscultation, respiration easy  Cardiovascular:  S1 and S2 audible, No added heart sounds, No signs of ankle edema, or volume overload, No evidence of JVD, No crackles  Abdomen:   nontender  Extremities:  No * edema  Pulses; palpable  Neuro: grossly normal      MEDICAL DECISION MAKING;    I agree with the above plan, which was planned by myself and discussed with NP.   Start aspirn and plavix  Will sign off       Cande Graham MD [facility] 08/19/20

## 2020-08-19 NOTE — PROGRESS NOTES
Hospitalist Progress Note      Name:  Brandon Calhoun /Age/Sex: 1960  (62 y.o. female)   MRN & CSN:  [id number] & [id number] Admission Date/Time: 2020  8:42 PM   [facility]  [facility] PCP: Ermelinda Dickerson. Alex Dannion 58 Day: 9    Assessment and Plan:   Brandon Calhoun is a 62 y.o.  female  who presents with <principal problem not specified>    > mid transverse colon stricture  CT scan with abrupt narrowing involving the splenic flexure/distal transverse colon with mucosal thickening and pericolonic stranding, caused bowel obstruction  GI evaluated, s/p colonoscopy. Not able to pass scope beyond stricture, recommended surgical resection-need outpatient follow-up for repeat C scope in 6 months. holding DAPT-continue on Integrilin by cardiology. - s/p Left hemicolectomy   - pain controlled,      >H/o amphetamine use     >CAD s/p recent PCI  - cardiology consulted, justin op management  Holding ASA, plavix for surgery  Increased dose of Coreg.     >Hypothyroidism  Continue Synthroid.     Hepatitis C  HLD  COPD     HTN-continue Norvasc, Coreg. BP higher side. Nicotine abuse    Diet Diet NPO Effective Now Exceptions are: Sips with Meds   DVT Prophylaxis ? Lovenox once ok by surgery   GI Prophylaxis ? PPI   Code Status Full Code   Disposition  Home with [place] pending surgery clearance. History of Present Illness:     Pt S&E.     abd pain controlled, no chest pain, no dyspnea, no N/V, no F/C.     10-14 point ROS reviewed negative, unless as noted above    Objective: Intake/Output Summary (Last 24 hours) at 2020 1435  Last data filed at 2020 0956  Gross per 24 hour   Intake 1296.74 ml   Output 845 ml   Net 451.74 ml      Vitals:   Vitals:    20 1200   BP: (!) 151/106   Pulse: 64   Resp: 16   Temp: 97.5 °F (36.4 °C)   SpO2: 96%     Physical Exam:      GEN Awake female, cooperative , no apparent distress. RESP Decreased air sounds.   Symmetric chest movement Jose J Lewis CARDIO/VASC S1/S2 auscultated. Regular rate. GI Abdomen is soft , post op . MSK Spontaneous movement of all extremities  SKIN Normal coloration, warm, dry. NEURO no lateralizing weakness. PSYCH Awake, alert, cooperative. Affect appropriate.     Medications:   Medications:    [START ON 8/20/2020] clopidogrel  75 mg Oral Daily    [START ON 8/20/2020] aspirin  81 mg Oral Daily    carvedilol  25 mg Oral BID WC    amLODIPine  10 mg Oral Daily    nicotine  1 patch Transdermal Daily    sodium chloride flush  10 mL Intravenous 2 times per day    escitalopram  20 mg Oral Daily    levothyroxine  100 mcg Oral Daily    budesonide-formoterol  2 puff Inhalation BID    gabapentin  800 mg Oral TID    cefTRIAXone (ROCEPHIN) IV  1 g Intravenous Q24H    metroNIDAZOLE  500 mg Intravenous Q8H    pantoprazole  40 mg Intravenous Daily    metoclopramide  10 mg Intravenous Q8H    ondansetron  4 mg Intravenous Q8H    sodium chloride flush  10 mL Intravenous BID      Infusions:    lactated ringers 100 mL/hr at 08/19/20 1009     PRN Meds: morphine, 2 mg, Q2H PRN    Or  morphine, 4 mg, Q2H PRN  LORazepam, 1 mg, Q6H PRN  morphine, 4 mg, Q4H PRN  hydrALAZINE (APRESOLINE) ivpb, 10 mg, Q4H PRN  sodium chloride flush, 10 mL, PRN  acetaminophen, 650 mg, Q6H PRN    Or  acetaminophen, 650 mg, Q6H PRN  magnesium hydroxide, 30 mL, Daily PRN  promethazine, 12.5 mg, Q6H PRN          Electronically signed by She Johns MD on 8/19/2020 at 2:35 PM

## 2020-08-20 LAB
ANION GAP SERPL CALCULATED.3IONS-SCNC: 5 MMOL/L (ref 4–16)
BASOPHILS ABSOLUTE: 0.1 K/CU MM
BASOPHILS RELATIVE PERCENT: 0.4 % (ref 0–1)
BUN BLDV-MCNC: 6 MG/DL (ref 6–23)
CALCIUM SERPL-MCNC: 8.6 MG/DL (ref 8.3–10.6)
CHLORIDE BLD-SCNC: 100 MMOL/L (ref 99–110)
CO2: 33 MMOL/L (ref 21–32)
CREAT SERPL-MCNC: 0.7 MG/DL (ref 0.6–1.1)
DIFFERENTIAL TYPE: ABNORMAL
EOSINOPHILS ABSOLUTE: 0.4 K/CU MM
EOSINOPHILS RELATIVE PERCENT: 3.3 % (ref 0–3)
GFR AFRICAN AMERICAN: >60 ML/MIN/1.73M2
GFR NON-AFRICAN AMERICAN: >60 ML/MIN/1.73M2
GLUCOSE BLD-MCNC: 89 MG/DL (ref 70–99)
HCT VFR BLD CALC: 35 % (ref 37–47)
HEMOGLOBIN: 11.3 GM/DL (ref 12.5–16)
IMMATURE NEUTROPHIL %: 0.9 % (ref 0–0.43)
LYMPHOCYTES ABSOLUTE: 2.9 K/CU MM
LYMPHOCYTES RELATIVE PERCENT: 25.3 % (ref 24–44)
MCH RBC QN AUTO: 30.1 PG (ref 27–31)
MCHC RBC AUTO-ENTMCNC: 32.3 % (ref 32–36)
MCV RBC AUTO: 93.3 FL (ref 78–100)
MONOCYTES ABSOLUTE: 0.8 K/CU MM
MONOCYTES RELATIVE PERCENT: 7.3 % (ref 0–4)
NUCLEATED RBC %: 0 %
PDW BLD-RTO: 12.6 % (ref 11.7–14.9)
PLATELET # BLD: 381 K/CU MM (ref 140–440)
PMV BLD AUTO: 8.2 FL (ref 7.5–11.1)
POTASSIUM SERPL-SCNC: 4.6 MMOL/L (ref 3.5–5.1)
RBC # BLD: 3.75 M/CU MM (ref 4.2–5.4)
SEGMENTED NEUTROPHILS ABSOLUTE COUNT: 7.1 K/CU MM
SEGMENTED NEUTROPHILS RELATIVE PERCENT: 62.8 % (ref 36–66)
SODIUM BLD-SCNC: 138 MMOL/L (ref 135–145)
TOTAL IMMATURE NEUTOROPHIL: 0.1 K/CU MM
TOTAL NUCLEATED RBC: 0 K/CU MM
WBC # BLD: 11.3 K/CU MM (ref 4–10.5)

## 2020-08-20 PROCEDURE — 99024 POSTOP FOLLOW-UP VISIT: CPT | Performed by: SURGERY

## 2020-08-20 PROCEDURE — 6370000000 HC RX 637 (ALT 250 FOR IP): Performed by: NURSE PRACTITIONER

## 2020-08-20 PROCEDURE — 85025 COMPLETE CBC W/AUTO DIFF WBC: CPT

## 2020-08-20 PROCEDURE — 6370000000 HC RX 637 (ALT 250 FOR IP): Performed by: SURGERY

## 2020-08-20 PROCEDURE — 2700000000 HC OXYGEN THERAPY PER DAY

## 2020-08-20 PROCEDURE — 94761 N-INVAS EAR/PLS OXIMETRY MLT: CPT

## 2020-08-20 PROCEDURE — 2580000003 HC RX 258: Performed by: SURGERY

## 2020-08-20 PROCEDURE — 6360000002 HC RX W HCPCS: Performed by: SURGERY

## 2020-08-20 PROCEDURE — 94640 AIRWAY INHALATION TREATMENT: CPT

## 2020-08-20 PROCEDURE — 2500000003 HC RX 250 WO HCPCS: Performed by: SURGERY

## 2020-08-20 PROCEDURE — C9113 INJ PANTOPRAZOLE SODIUM, VIA: HCPCS | Performed by: SURGERY

## 2020-08-20 PROCEDURE — 36415 COLL VENOUS BLD VENIPUNCTURE: CPT

## 2020-08-20 PROCEDURE — 2140000000 HC CCU INTERMEDIATE R&B

## 2020-08-20 PROCEDURE — 80048 BASIC METABOLIC PNL TOTAL CA: CPT

## 2020-08-20 RX ADMIN — METRONIDAZOLE 500 MG: 500 INJECTION, SOLUTION INTRAVENOUS at 20:09

## 2020-08-20 RX ADMIN — ONDANSETRON 4 MG: 2 INJECTION INTRAMUSCULAR; INTRAVENOUS at 06:50

## 2020-08-20 RX ADMIN — CEFTRIAXONE 1 G: 1 INJECTION, POWDER, FOR SOLUTION INTRAMUSCULAR; INTRAVENOUS at 06:50

## 2020-08-20 RX ADMIN — SODIUM CHLORIDE, POTASSIUM CHLORIDE, SODIUM LACTATE AND CALCIUM CHLORIDE: 600; 310; 30; 20 INJECTION, SOLUTION INTRAVENOUS at 11:04

## 2020-08-20 RX ADMIN — MORPHINE SULFATE 4 MG: 4 INJECTION, SOLUTION INTRAMUSCULAR; INTRAVENOUS at 06:50

## 2020-08-20 RX ADMIN — CARVEDILOL 25 MG: 25 TABLET, FILM COATED ORAL at 18:35

## 2020-08-20 RX ADMIN — SODIUM CHLORIDE, PRESERVATIVE FREE 10 ML: 5 INJECTION INTRAVENOUS at 11:16

## 2020-08-20 RX ADMIN — ONDANSETRON 4 MG: 2 INJECTION INTRAMUSCULAR; INTRAVENOUS at 15:36

## 2020-08-20 RX ADMIN — ESCITALOPRAM OXALATE 20 MG: 10 TABLET ORAL at 11:48

## 2020-08-20 RX ADMIN — AMLODIPINE BESYLATE 10 MG: 10 TABLET ORAL at 11:47

## 2020-08-20 RX ADMIN — LORAZEPAM 1 MG: 2 INJECTION INTRAMUSCULAR; INTRAVENOUS at 18:35

## 2020-08-20 RX ADMIN — ONDANSETRON 4 MG: 2 INJECTION INTRAMUSCULAR; INTRAVENOUS at 23:23

## 2020-08-20 RX ADMIN — CARVEDILOL 25 MG: 25 TABLET, FILM COATED ORAL at 11:47

## 2020-08-20 RX ADMIN — GABAPENTIN 800 MG: 400 CAPSULE ORAL at 20:08

## 2020-08-20 RX ADMIN — MORPHINE SULFATE 4 MG: 4 INJECTION, SOLUTION INTRAMUSCULAR; INTRAVENOUS at 02:49

## 2020-08-20 RX ADMIN — MORPHINE SULFATE 4 MG: 4 INJECTION, SOLUTION INTRAMUSCULAR; INTRAVENOUS at 20:09

## 2020-08-20 RX ADMIN — BUDESONIDE AND FORMOTEROL FUMARATE DIHYDRATE 2 PUFF: 160; 4.5 AEROSOL RESPIRATORY (INHALATION) at 20:44

## 2020-08-20 RX ADMIN — METOCLOPRAMIDE 10 MG: 5 INJECTION, SOLUTION INTRAMUSCULAR; INTRAVENOUS at 23:23

## 2020-08-20 RX ADMIN — METRONIDAZOLE 500 MG: 500 INJECTION, SOLUTION INTRAVENOUS at 11:03

## 2020-08-20 RX ADMIN — MORPHINE SULFATE 4 MG: 4 INJECTION, SOLUTION INTRAMUSCULAR; INTRAVENOUS at 11:14

## 2020-08-20 RX ADMIN — PANTOPRAZOLE SODIUM 40 MG: 40 INJECTION, POWDER, FOR SOLUTION INTRAVENOUS at 11:14

## 2020-08-20 RX ADMIN — GABAPENTIN 800 MG: 400 CAPSULE ORAL at 11:47

## 2020-08-20 RX ADMIN — MORPHINE SULFATE 4 MG: 4 INJECTION, SOLUTION INTRAMUSCULAR; INTRAVENOUS at 15:37

## 2020-08-20 RX ADMIN — ASPIRIN 81 MG CHEWABLE TABLET 81 MG: 81 TABLET CHEWABLE at 11:47

## 2020-08-20 RX ADMIN — LEVOTHYROXINE SODIUM 100 MCG: 100 TABLET ORAL at 06:50

## 2020-08-20 RX ADMIN — LORAZEPAM 1 MG: 2 INJECTION INTRAMUSCULAR; INTRAVENOUS at 11:14

## 2020-08-20 RX ADMIN — GABAPENTIN 800 MG: 400 CAPSULE ORAL at 18:35

## 2020-08-20 RX ADMIN — METOCLOPRAMIDE 10 MG: 5 INJECTION, SOLUTION INTRAMUSCULAR; INTRAVENOUS at 06:50

## 2020-08-20 RX ADMIN — LORAZEPAM 1 MG: 2 INJECTION INTRAMUSCULAR; INTRAVENOUS at 04:38

## 2020-08-20 RX ADMIN — PROMETHAZINE HYDROCHLORIDE 12.5 MG: 25 TABLET ORAL at 11:48

## 2020-08-20 RX ADMIN — SODIUM CHLORIDE, PRESERVATIVE FREE 10 ML: 5 INJECTION INTRAVENOUS at 11:15

## 2020-08-20 RX ADMIN — CLOPIDOGREL BISULFATE 75 MG: 75 TABLET ORAL at 11:47

## 2020-08-20 RX ADMIN — METOCLOPRAMIDE 10 MG: 5 INJECTION, SOLUTION INTRAMUSCULAR; INTRAVENOUS at 15:36

## 2020-08-20 ASSESSMENT — PAIN DESCRIPTION - PROGRESSION
CLINICAL_PROGRESSION: RESOLVED

## 2020-08-20 ASSESSMENT — PAIN SCALES - GENERAL
PAINLEVEL_OUTOF10: 10
PAINLEVEL_OUTOF10: 8
PAINLEVEL_OUTOF10: 0
PAINLEVEL_OUTOF10: 10
PAINLEVEL_OUTOF10: 10

## 2020-08-20 ASSESSMENT — ENCOUNTER SYMPTOMS
EYES NEGATIVE: 1
ALLERGIC/IMMUNOLOGIC NEGATIVE: 1
ABDOMINAL PAIN: 1
RESPIRATORY NEGATIVE: 1

## 2020-08-20 NOTE — PLAN OF CARE
Problem: Pain:  Goal: Pain level will decrease  Description: Pain level will decrease  8/20/2020 1927 by Kailyn Jefferson RN  Outcome: Ongoing     Problem: Pain:  Goal: Control of acute pain  Description: Control of acute pain  8/20/2020 1927 by Kailyn Jefferson RN  Outcome: Ongoing     Problem: Pain:  Goal: Control of chronic pain  Description: Control of chronic pain  8/20/2020 1927 by Kailyn Jefferson RN  Outcome: Ongoing     Problem: Pain:  Goal: Patient's pain/discomfort is manageable  Description: Patient's pain/discomfort is manageable  8/20/2020 1927 by Kailyn Jefferson RN  Outcome: Ongoing     Problem: Infection:  Goal: Will remain free from infection  Description: Will remain free from infection  8/20/2020 1927 by Kailyn Jefferson RN  Outcome: Ongoing     Problem: Safety:  Goal: Free from accidental physical injury  Description: Free from accidental physical injury  8/20/2020 1927 by Kailyn Jefferson RN  Outcome: Ongoing     Problem: Safety:  Goal: Free from intentional harm  Description: Free from intentional harm  8/20/2020 1927 by Kailyn Jefferson RN  Outcome: Ongoing     Problem: Daily Care:  Goal: Daily care needs are met  Description: Daily care needs are met  8/20/2020 1927 by Kailyn Jefferson RN  Outcome: Ongoing     Problem: Skin Integrity:  Goal: Skin integrity will stabilize  Description: Skin integrity will stabilize  8/20/2020 1927 by Kailyn Jefferson RN  Outcome: Ongoing     Problem: Discharge Planning:  Goal: Patients continuum of care needs are met  Description: Patients continuum of care needs are met  8/20/2020 1927 by Kailyn Jefferson RN  Outcome: Ongoing     Problem: Nutritional:  Goal: Nutritional status will improve  Description: Nutritional status will improve  8/20/2020 1927 by Kailyn Jefferson RN  Outcome: Ongoing     Problem: Nutritional:  Goal: Ability to follow a diet with enough fiber (20 to 30 grams) for normal bowel function will improve  Description: Ability to follow a diet with enough fiber (20 to 30 grams) for normal bowel function will improve  8/20/2020 1927 by Pancho Perez RN  Outcome: Ongoing     Problem: Physical Regulation:  Goal: Will remain free from infection  Description: Will remain free from infection  8/20/2020 1927 by Pancho Perez RN  Outcome: Ongoing     Problem: Physical Regulation:  Goal: Diagnostic test results will improve  Description: Diagnostic test results will improve  8/20/2020 1927 by Pancho Perez RN  Outcome: Ongoing     Problem: Physical Regulation:  Goal: Ability to maintain vital signs within normal range will improve  Description: Ability to maintain vital signs within normal range will improve  8/20/2020 1927 by Pancho Perez RN  Outcome: Ongoing     Problem: Physical Regulation:  Goal: Complications related to the disease process, condition or treatment will be avoided or minimized  Description: Complications related to the disease process, condition or treatment will be avoided or minimized  8/20/2020 1927 by Pancho Perez RN  Outcome: Ongoing     Problem: Physical Regulation:  Goal: Ability to maintain clinical measurements within normal limits will improve  Description: Ability to maintain clinical measurements within normal limits will improve  8/20/2020 1927 by Panhco Perez RN  Outcome: Ongoing     Problem: Respiratory:  Goal: Ability to maintain normal respiratory secretions will improve  Description: Ability to maintain normal respiratory secretions will improve  8/20/2020 1927 by Pancho Perez RN  Outcome: Ongoing     Problem: Skin Integrity:  Goal: Demonstration of wound healing without infection will improve  Description: Demonstration of wound healing without infection will improve  8/20/2020 1927 by Pancho Perez RN  Outcome: Ongoing     Problem: Skin Integrity:  Goal: Complications related to intravenous access or infusion will be avoided or minimized  Description: Complications related to intravenous access or infusion will be avoided or minimized  8/20/2020 1927 by Pancho Perez RN  Outcome: Ongoing     Problem: Skin Integrity:  Goal: Risk for impaired skin integrity will decrease  Description: Risk for impaired skin integrity will decrease  8/20/2020 1927 by Pancho Perez RN  Outcome: Ongoing     Problem: Activity:  Goal: Risk for activity intolerance will decrease  Description: Risk for activity intolerance will decrease  8/20/2020 1927 by Pancho Perez RN  Outcome: Ongoing     Problem: Bowel/Gastric:  Goal: Diagnostic test results will improve  Description: Diagnostic test results will improve  8/20/2020 1927 by Pancho Perez RN  Outcome: Ongoing     Problem: Bowel/Gastric:  Goal: Bowel function will improve  Description: Bowel function will improve  8/20/2020 1927 by Pancho Perez RN  Outcome: Ongoing     Problem: Bowel/Gastric:  Goal: Occurrences of nausea will decrease  Description: Occurrences of nausea will decrease  8/20/2020 1927 by Pancho Perez RN  Outcome: Ongoing     Problem: Bowel/Gastric:  Goal: Occurrences of vomiting will decrease  Description: Occurrences of vomiting will decrease  8/20/2020 1927 by Pancho Perez RN  Outcome: Ongoing     Problem: Bowel/Gastric:  Goal: Control of bowel function will improve  Description: Control of bowel function will improve  8/20/2020 1927 by Pancho Perez RN  Outcome: Ongoing     Problem:  Bowel/Gastric:  Goal: Ability to achieve a regular elimination pattern will improve  Description: Ability to achieve a regular elimination pattern will improve  8/20/2020 1927 by Pancho Perez RN  Outcome: Ongoing     Problem: Fluid Volume:  Goal: Maintenance of adequate hydration will improve  Description: Maintenance of adequate hydration will improve  8/20/2020 1927 by Pancho Perez RN  Outcome: Ongoing     Problem: Health Behavior:  Goal: Ability to state signs and symptoms to report to health care provider will improve  Description: Ability to state signs and symptoms to report to health care provider will improve  8/20/2020 1927 by Dayana Iverson RN  Outcome: Ongoing     Problem: Sensory:  Goal: Pain level will decrease  Description: Pain level will decrease  8/20/2020 1927 by Dayana Iverson RN  Outcome: Ongoing     Problem: Sensory:  Goal: Ability to identify factors that increase the pain will improve  Description: Ability to identify factors that increase the pain will improve  8/20/2020 1927 by Dayana Iverson RN  Outcome: Ongoing     Problem: Sensory:  Goal: Ability to notify healthcare provider of pain before it becomes unmanageable or unbearable will improve  Description: Ability to notify healthcare provider of pain before it becomes unmanageable or unbearable will improve  8/20/2020 1927 by Dayana Iverson RN  Outcome: Ongoing     Problem: Falls - Risk of:  Goal: Will remain free from falls  Description: Will remain free from falls  8/20/2020 1927 by Dayana Iverson RN  Outcome: Ongoing

## 2020-08-20 NOTE — PROGRESS NOTES
Hospitalist Progress Note      Name:  Evi Franz /Age/Sex: 1960  (62 y.o. female)   MRN & CSN:  [id number] & [id number] Admission Date/Time: 2020  8:42 PM   [facility]  [facility] PCP: Dahlia Ponce 59 Day: 10    Assessment and Plan:   Evi Franz is a 62 y.o.  female  who presents with <principal problem not specified>    > mid transverse colon stricture  CT scan with abrupt narrowing involving the splenic flexure/distal transverse colon with mucosal thickening and pericolonic stranding, caused bowel obstruction  GI evaluated, s/p colonoscopy. Not able to pass scope beyond stricture, recommended surgical resection-need outpatient follow-up for repeat C scope in 6 months. holding DAPT-continue on Integrilin by cardiology. - s/p Left hemicolectomy   - pain improved, passing gas, NG tube removed, tolerating liquid diet.      >H/o amphetamine use     >CAD s/p recent PCI  - cardiology consulted, justin op management  Holding ASA, plavix for surgery  Increased dose of Coreg.     >Hypothyroidism  Continue Synthroid.     Hepatitis C  HLD  COPD     HTN-continue Norvasc, Coreg. BP higher side. Nicotine abuse    Diet DIET CLEAR LIQUID;   DVT Prophylaxis ? Lovenox once ok by surgery   GI Prophylaxis ? PPI   Code Status Full Code   Disposition  Home with [facility] pending surgery clearance. History of Present Illness:     Pt S&E.      pain improved, passing gas, NG tube removed, tolerating liquid diet. 10-14 point ROS reviewed negative, unless as noted above    Objective: Intake/Output Summary (Last 24 hours) at 2020 1507  Last data filed at 2020 1138  Gross per 24 hour   Intake 2560 ml   Output 4225 ml   Net -1665 ml      Vitals:   Vitals:    20 1200   BP: 111/64   Pulse: 85   Resp: 21   Temp: 98.3 °F (36.8 °C)   SpO2: 91%     Physical Exam:      GEN Awake female, cooperative , no apparent distress. RESP Decreased air sounds.   Symmetric chest movement Yannick Koch CARDIO/VASC S1/S2 auscultated. Regular rate. GI Abdomen is soft , post op . MSK Spontaneous movement of all extremities  SKIN Normal coloration, warm, dry. NEURO no lateralizing weakness. PSYCH Awake, alert, cooperative. Affect appropriate.     Medications:   Medications:    clopidogrel  75 mg Oral Daily    aspirin  81 mg Oral Daily    carvedilol  25 mg Oral BID WC    amLODIPine  10 mg Oral Daily    nicotine  1 patch Transdermal Daily    sodium chloride flush  10 mL Intravenous 2 times per day    escitalopram  20 mg Oral Daily    levothyroxine  100 mcg Oral Daily    budesonide-formoterol  2 puff Inhalation BID    gabapentin  800 mg Oral TID    cefTRIAXone (ROCEPHIN) IV  1 g Intravenous Q24H    metroNIDAZOLE  500 mg Intravenous Q8H    pantoprazole  40 mg Intravenous Daily    metoclopramide  10 mg Intravenous Q8H    ondansetron  4 mg Intravenous Q8H    sodium chloride flush  10 mL Intravenous BID      Infusions:    lactated ringers 100 mL/hr at 08/20/20 1104     PRN Meds: morphine, 2 mg, Q2H PRN    Or  morphine, 4 mg, Q2H PRN  LORazepam, 1 mg, Q6H PRN  morphine, 4 mg, Q4H PRN  hydrALAZINE (APRESOLINE) ivpb, 10 mg, Q4H PRN  sodium chloride flush, 10 mL, PRN  acetaminophen, 650 mg, Q6H PRN    Or  acetaminophen, 650 mg, Q6H PRN  magnesium hydroxide, 30 mL, Daily PRN  promethazine, 12.5 mg, Q6H PRN          Electronically signed by Miles Machado MD on 8/20/2020 at 3:07 PM

## 2020-08-20 NOTE — PROGRESS NOTES
General Surgery-Dr. Bryan Artist Day: 10    Chief Complaint on Admission: large bowel obstruction       Subjective:     Denies N/V. Denies F/C.  +flatus. Asking to have NG tube removed. And states she is thirsty. Tolerated ice chips. ROS:  Review of Systems   Constitutional: Positive for fatigue. HENT: Negative. Eyes: Negative. Respiratory: Negative. Cardiovascular: Negative. Gastrointestinal: Positive for abdominal pain. Endocrine: Negative. Genitourinary: Negative. Musculoskeletal: Negative. Skin: Negative. Allergic/Immunologic: Negative. Neurological: Negative. Hematological: Negative. Psychiatric/Behavioral: Negative. Allergies  Patient has no known allergies. Diagnosis Date    Amp[facility] abuse ([facility]     [facility]     NSTEMI (non-ST elevated myocardial infarction) ([facility] 2020       Objective:     Vitals:    20 0430   BP: 138/74   Pulse:    Resp:    Temp: 97.9 °F (36.6 °C)   SpO2:        TEMPERATURE:  Current -Temp: 97.9 °F (36.6 °C); Max - Temp  Av.9 °F (36.6 °C)  Min: 97.5 °F (36.4 °C)  Max: 98.9 °F (37.2 °C)    No intake/output data recorded. I/O last 3 completed shifts: In: 9729 [I.V.:1042]  Out: 1185 [Urine:1500; Emesis/NG output:150; Drains:70]      Physical Exam:  Physical Exam  Vitals signs reviewed. Constitutional:       General: She is not in acute distress. Appearance: Normal appearance. She is not ill-appearing, toxic-appearing or diaphoretic. HENT:      Head: Normocephalic and atraumatic. Right Ear: External ear normal.      Left Ear: External ear normal.   Eyes:      General:         Left eye: No discharge. Extraocular Movements: Extraocular movements intact. Cardiovascular:      Rate and Rhythm: Normal rate. Pulmonary:      Effort: No respiratory distress. Abdominal:      General: There is no distension (mild). Palpations: Abdomen is soft. Tenderness:  There is

## 2020-08-21 LAB
BASOPHILS ABSOLUTE: 0 K/CU MM
BASOPHILS RELATIVE PERCENT: 0.4 % (ref 0–1)
DIFFERENTIAL TYPE: ABNORMAL
EOSINOPHILS ABSOLUTE: 0.3 K/CU MM
EOSINOPHILS RELATIVE PERCENT: 5.8 % (ref 0–3)
HCT VFR BLD CALC: 52.3 % (ref 37–47)
HEMOGLOBIN: 17.2 GM/DL (ref 12.5–16)
IMMATURE NEUTROPHIL %: 0.4 % (ref 0–0.43)
LYMPHOCYTES ABSOLUTE: 1.2 K/CU MM
LYMPHOCYTES RELATIVE PERCENT: 22.3 % (ref 24–44)
MCH RBC QN AUTO: 29.9 PG (ref 27–31)
MCHC RBC AUTO-ENTMCNC: 32.9 % (ref 32–36)
MCV RBC AUTO: 90.8 FL (ref 78–100)
MONOCYTES ABSOLUTE: 0.4 K/CU MM
MONOCYTES RELATIVE PERCENT: 7.3 % (ref 0–4)
NUCLEATED RBC %: 0 %
PDW BLD-RTO: 12.4 % (ref 11.7–14.9)
PLATELET # BLD: 207 K/CU MM (ref 140–440)
PMV BLD AUTO: 7.8 FL (ref 7.5–11.1)
RBC # BLD: 5.76 M/CU MM (ref 4.2–5.4)
SEGMENTED NEUTROPHILS ABSOLUTE COUNT: 3.5 K/CU MM
SEGMENTED NEUTROPHILS RELATIVE PERCENT: 63.8 % (ref 36–66)
TOTAL IMMATURE NEUTOROPHIL: 0.02 K/CU MM
TOTAL NUCLEATED RBC: 0 K/CU MM
WBC # BLD: 5.5 K/CU MM (ref 4–10.5)

## 2020-08-21 PROCEDURE — 6360000002 HC RX W HCPCS: Performed by: SURGERY

## 2020-08-21 PROCEDURE — 94761 N-INVAS EAR/PLS OXIMETRY MLT: CPT

## 2020-08-21 PROCEDURE — 94640 AIRWAY INHALATION TREATMENT: CPT

## 2020-08-21 PROCEDURE — 99024 POSTOP FOLLOW-UP VISIT: CPT | Performed by: SURGERY

## 2020-08-21 PROCEDURE — 6370000000 HC RX 637 (ALT 250 FOR IP): Performed by: SURGERY

## 2020-08-21 PROCEDURE — 36415 COLL VENOUS BLD VENIPUNCTURE: CPT

## 2020-08-21 PROCEDURE — 6370000000 HC RX 637 (ALT 250 FOR IP): Performed by: NURSE PRACTITIONER

## 2020-08-21 PROCEDURE — 2580000003 HC RX 258: Performed by: SURGERY

## 2020-08-21 PROCEDURE — 85025 COMPLETE CBC W/AUTO DIFF WBC: CPT

## 2020-08-21 PROCEDURE — C9113 INJ PANTOPRAZOLE SODIUM, VIA: HCPCS | Performed by: SURGERY

## 2020-08-21 PROCEDURE — 2500000003 HC RX 250 WO HCPCS: Performed by: SURGERY

## 2020-08-21 PROCEDURE — 2140000000 HC CCU INTERMEDIATE R&B

## 2020-08-21 RX ORDER — OXYCODONE HYDROCHLORIDE AND ACETAMINOPHEN 5; 325 MG/1; MG/1
1 TABLET ORAL EVERY 4 HOURS PRN
Status: DISCONTINUED | OUTPATIENT
Start: 2020-08-21 | End: 2020-08-23

## 2020-08-21 RX ADMIN — SODIUM CHLORIDE, PRESERVATIVE FREE 10 ML: 5 INJECTION INTRAVENOUS at 20:49

## 2020-08-21 RX ADMIN — GABAPENTIN 800 MG: 400 CAPSULE ORAL at 13:35

## 2020-08-21 RX ADMIN — SODIUM CHLORIDE, POTASSIUM CHLORIDE, SODIUM LACTATE AND CALCIUM CHLORIDE: 600; 310; 30; 20 INJECTION, SOLUTION INTRAVENOUS at 10:31

## 2020-08-21 RX ADMIN — OXYCODONE AND ACETAMINOPHEN 1 TABLET: 5; 325 TABLET ORAL at 19:00

## 2020-08-21 RX ADMIN — CARVEDILOL 25 MG: 25 TABLET, FILM COATED ORAL at 16:23

## 2020-08-21 RX ADMIN — LORAZEPAM 1 MG: 2 INJECTION INTRAMUSCULAR; INTRAVENOUS at 16:18

## 2020-08-21 RX ADMIN — PANTOPRAZOLE SODIUM 40 MG: 40 INJECTION, POWDER, FOR SOLUTION INTRAVENOUS at 09:40

## 2020-08-21 RX ADMIN — SODIUM CHLORIDE, POTASSIUM CHLORIDE, SODIUM LACTATE AND CALCIUM CHLORIDE: 600; 310; 30; 20 INJECTION, SOLUTION INTRAVENOUS at 21:02

## 2020-08-21 RX ADMIN — AMLODIPINE BESYLATE 10 MG: 10 TABLET ORAL at 09:40

## 2020-08-21 RX ADMIN — LEVOTHYROXINE SODIUM 100 MCG: 100 TABLET ORAL at 05:19

## 2020-08-21 RX ADMIN — MORPHINE SULFATE 4 MG: 4 INJECTION, SOLUTION INTRAMUSCULAR; INTRAVENOUS at 01:07

## 2020-08-21 RX ADMIN — METRONIDAZOLE 500 MG: 500 INJECTION, SOLUTION INTRAVENOUS at 05:19

## 2020-08-21 RX ADMIN — BUDESONIDE AND FORMOTEROL FUMARATE DIHYDRATE 2 PUFF: 160; 4.5 AEROSOL RESPIRATORY (INHALATION) at 11:54

## 2020-08-21 RX ADMIN — GABAPENTIN 800 MG: 400 CAPSULE ORAL at 21:00

## 2020-08-21 RX ADMIN — MORPHINE SULFATE 4 MG: 4 INJECTION, SOLUTION INTRAMUSCULAR; INTRAVENOUS at 15:57

## 2020-08-21 RX ADMIN — OXYCODONE AND ACETAMINOPHEN 1 TABLET: 5; 325 TABLET ORAL at 13:36

## 2020-08-21 RX ADMIN — ASPIRIN 81 MG CHEWABLE TABLET 81 MG: 81 TABLET CHEWABLE at 09:40

## 2020-08-21 RX ADMIN — ONDANSETRON 4 MG: 2 INJECTION INTRAMUSCULAR; INTRAVENOUS at 05:18

## 2020-08-21 RX ADMIN — CEFTRIAXONE 1 G: 1 INJECTION, POWDER, FOR SOLUTION INTRAMUSCULAR; INTRAVENOUS at 09:45

## 2020-08-21 RX ADMIN — LORAZEPAM 1 MG: 2 INJECTION INTRAMUSCULAR; INTRAVENOUS at 01:07

## 2020-08-21 RX ADMIN — LORAZEPAM 1 MG: 2 INJECTION INTRAMUSCULAR; INTRAVENOUS at 23:35

## 2020-08-21 RX ADMIN — GABAPENTIN 800 MG: 400 CAPSULE ORAL at 09:39

## 2020-08-21 RX ADMIN — METOCLOPRAMIDE 10 MG: 5 INJECTION, SOLUTION INTRAMUSCULAR; INTRAVENOUS at 05:18

## 2020-08-21 RX ADMIN — OXYCODONE AND ACETAMINOPHEN 1 TABLET: 5; 325 TABLET ORAL at 23:34

## 2020-08-21 RX ADMIN — METOCLOPRAMIDE 10 MG: 5 INJECTION, SOLUTION INTRAMUSCULAR; INTRAVENOUS at 23:35

## 2020-08-21 RX ADMIN — CLOPIDOGREL BISULFATE 75 MG: 75 TABLET ORAL at 09:39

## 2020-08-21 RX ADMIN — SODIUM CHLORIDE, PRESERVATIVE FREE 10 ML: 5 INJECTION INTRAVENOUS at 09:47

## 2020-08-21 RX ADMIN — PROMETHAZINE HYDROCHLORIDE 12.5 MG: 25 TABLET ORAL at 21:00

## 2020-08-21 RX ADMIN — METOCLOPRAMIDE 10 MG: 5 INJECTION, SOLUTION INTRAMUSCULAR; INTRAVENOUS at 15:57

## 2020-08-21 RX ADMIN — MORPHINE SULFATE 4 MG: 4 INJECTION, SOLUTION INTRAMUSCULAR; INTRAVENOUS at 10:18

## 2020-08-21 RX ADMIN — MORPHINE SULFATE 4 MG: 4 INJECTION, SOLUTION INTRAMUSCULAR; INTRAVENOUS at 20:49

## 2020-08-21 RX ADMIN — LORAZEPAM 1 MG: 2 INJECTION INTRAMUSCULAR; INTRAVENOUS at 10:14

## 2020-08-21 RX ADMIN — SODIUM CHLORIDE, PRESERVATIVE FREE 10 ML: 5 INJECTION INTRAVENOUS at 23:35

## 2020-08-21 RX ADMIN — MORPHINE SULFATE 4 MG: 4 INJECTION, SOLUTION INTRAMUSCULAR; INTRAVENOUS at 05:19

## 2020-08-21 RX ADMIN — ONDANSETRON 4 MG: 2 INJECTION INTRAMUSCULAR; INTRAVENOUS at 23:35

## 2020-08-21 RX ADMIN — ESCITALOPRAM OXALATE 20 MG: 10 TABLET ORAL at 09:40

## 2020-08-21 RX ADMIN — CARVEDILOL 25 MG: 25 TABLET, FILM COATED ORAL at 09:39

## 2020-08-21 ASSESSMENT — PAIN SCALES - GENERAL
PAINLEVEL_OUTOF10: 6
PAINLEVEL_OUTOF10: 10
PAINLEVEL_OUTOF10: 0
PAINLEVEL_OUTOF10: 10
PAINLEVEL_OUTOF10: 8
PAINLEVEL_OUTOF10: 10
PAINLEVEL_OUTOF10: 10
PAINLEVEL_OUTOF10: 8
PAINLEVEL_OUTOF10: 0
PAINLEVEL_OUTOF10: 5

## 2020-08-21 ASSESSMENT — ENCOUNTER SYMPTOMS
ALLERGIC/IMMUNOLOGIC NEGATIVE: 1
ABDOMINAL PAIN: 1
EYES NEGATIVE: 1
RESPIRATORY NEGATIVE: 1

## 2020-08-21 NOTE — PROGRESS NOTES
Comprehensive Nutrition Assessment    Type and Reason for Visit:  Reassess    Nutrition Recommendations/Plan:   · Continue Full Liquid Diet  · Begin standard high hussein oral nutrition supplement tid    Nutrition Assessment:  Tolerated Clears post op with adequate intake, appetite good, +flatus/BM. Diet advanced to Fulls. Will ad oral supplement. Malnutrition Assessment:  Malnutrition Status:  Severe malnutrition    Context:  Acute Illness     Findings of the 6 clinical characteristics of malnutrition:  Energy Intake:  50% or less of estimated energy requirements for 5 or more days  Weight Loss:   7.5% over 3 months     Body Fat Loss:  Unable to assess     Muscle Mass Loss:  Unable to assess    Fluid Accumulation:  No significant fluid accumulation     Strength:  Not Performed    Estimated Daily Nutrient Needs:  Energy (kcal):  3293-8069 (Lenoir-St Jeor)   Protein (g):  83-97 (1.2-1.4 g/kg)   Fluid (ml/day):  9708-0047 (1 ml/kcal)    Wounds:  Surgical Wound       Current Nutrition Therapies:    DIET FULL LIQUID    Anthropometric Measures:  · Height: 5' 2\" (157.5 cm)  · Current Body Weight: 163 lb 14.4 oz (74.3 kg)   · Admission Body Weight: 150 lb 9 oz (68.3 kg)    · Usual Body Weight: 183 lb 1.6 oz (83.1 kg)(5/5/20)     · Ideal Body Weight: 110 lbs; % Ideal Body Weight 149 %   · BMI: 30  · BMI Categories: Overweight (BMI 25.0-29. 9)       Nutrition Diagnosis:   · Severe malnutrition, In context of acute illness or injury related to altered GI structure, inadequate protein-energy intake as evidenced by NPO or clear liquid status due to medical condition, poor intake prior to admission, weight loss 7.5% in 3 months    Nutrition Interventions:   Food and/or Nutrient Delivery:  Continue Current Diet, Start Oral Nutrition Supplement  Nutrition Education/Counseling:  No recommendation at this time   Coordination of Nutrition Care:  Continued Inpatient Monitoring    Goals:  Pt will tolerate greater than 50% of her meals and supplements       Nutrition Monitoring and Evaluation:   Food/Nutrient Intake Outcomes:  Food and Nutrient Intake, Supplement Intake  Physical Signs/Symptoms Outcomes:  Biochemical Data, GI Status, Nutrition Focused Physical Findings, Skin, Weight     Discharge Planning:    Continue Oral Nutrition Supplement     Electronically signed by Abrahan Cooper RD, LD on 8/21/20 at 2:30 PM EDT    Contact: [phone]

## 2020-08-21 NOTE — PROGRESS NOTES
Hospitalist Progress Note      Name:  Ami Avery /Age/Sex: 1960  (62 y.o. female)   MRN & CSN:  [id number] & [id number] Admission Date/Time: 2020  8:42 PM   Location:  [facility]-A PCP: Dahlia Wetzel 59 Day: 11    Assessment and Plan:   Ami Avery is a 62 y.o.  female  who presents with <principal problem not specified>    > mid transverse colon stricture  CT scan with abrupt narrowing involving the splenic flexure/distal transverse colon with mucosal thickening and pericolonic stranding, caused bowel obstruction  GI evaluated, s/p colonoscopy. Not able to pass scope beyond stricture, recommended surgical resection-need outpatient follow-up for repeat C scope in 6 months. holding DAPT-continue on Integrilin by cardiology. - s/p Left hemicolectomy   - pain improved, passing gas, NG tube removed, tolerating liquid diet. - diet advanced, IV pain meds switched to po      >H/o amphetamine use     >CAD s/p recent PCI  - cardiology consulted, justin op management  Holding ASA, plavix for surgery  Increased dose of Coreg.     >Hypothyroidism  Continue Synthroid.     Hepatitis C  HLD  COPD     HTN-continue Norvasc, Coreg. BP higher side. Nicotine abuse    Diet DIET CLEAR LIQUID;   DVT Prophylaxis ? Lovenox once ok by surgery   GI Prophylaxis ? PPI   Code Status Full Code   Disposition  Home with [place] pending surgery clearance. Anticipate on Monday     History of Present Illness:     Pt S&E.      tolerating po intake, improving abd pain, no chest pain, no dyspnea, had BM    10-14 point ROS reviewed negative, unless as noted above    Objective: Intake/Output Summary (Last 24 hours) at 2020 1141  Last data filed at 2020 0945  Gross per 24 hour   Intake 480 ml   Output 70 ml   Net 410 ml      Vitals:   Vitals:    20 0934   BP:    Pulse:    Resp:    Temp:    SpO2: 91%     Physical Exam:      GEN Awake female, cooperative , no apparent distress.

## 2020-08-21 NOTE — PROGRESS NOTES
General Surgery- [facility] Day: 11    Chief Complaint on Admission: large bowel obstruction       Subjective:     Denies N/V. Denies F/C.  +flatus and + soft BM. Pain controlled. Asking about having her diet advanced. Tolerating clears. States \"Im hungry!!!\"      ROS:  Review of Systems   Constitutional: Positive for fatigue. HENT: Negative. Eyes: Negative. Respiratory: Negative. Cardiovascular: Negative. Gastrointestinal: Positive for abdominal pain. Endocrine: Negative. Genitourinary: Negative. Musculoskeletal: Negative. Skin: Negative. Allergic/Immunologic: Negative. Neurological: Negative. Hematological: Negative. Psychiatric/Behavioral: Negative. Allergies  Patient has no known allergies. Diagnosis Date    Amphetamine abuse ([facility]     Cocaine abuse ([facility]     [facility]       Objective:     Vitals:    20 1145   BP: 95/61   Pulse: 54   Resp: 16   Temp: 97.6 °F (36.4 °C)   SpO2:        TEMPERATURE:  Current -Temp: 97.6 °F (36.4 °C); Max - Temp  Av.8 °F (36.6 °C)  Min: 97.6 °F (36.4 °C)  Max: 98.3 °F (36.8 °C)    I/O this shift: In: 480 [P.O.:480]  Out: 20 [Drains:20]I/O last 3 completed shifts: In: 3293 [I.V.:1518]  Out: 6824 [Urine:2325; Emesis/NG output:150; Drains:100]      Physical Exam:  Physical Exam  Vitals signs reviewed. Constitutional:       General: She is not in acute distress. Appearance: Normal appearance. She is not ill-appearing, toxic-appearing or diaphoretic. HENT:      Head: Normocephalic and atraumatic. Right Ear: External ear normal.      Left Ear: External ear normal.   Eyes:      General:         Left eye: No discharge. Extraocular Movements: Extraocular movements intact. Cardiovascular:      Rate and Rhythm: Normal rate. Pulmonary:      Effort: No respiratory distress. Abdominal:      General: There is no distension (mild).

## 2020-08-21 NOTE — PROGRESS NOTES
3 loose staples not well approximated at umbilicus area of abdominal incision. Dr. Harley Mena aware.

## 2020-08-22 LAB
BASOPHILS ABSOLUTE: 0.1 K/CU MM
BASOPHILS RELATIVE PERCENT: 0.6 % (ref 0–1)
DIFFERENTIAL TYPE: ABNORMAL
EOSINOPHILS ABSOLUTE: 0.5 K/CU MM
EOSINOPHILS RELATIVE PERCENT: 6.9 % (ref 0–3)
HCT VFR BLD CALC: 33.7 % (ref 37–47)
HEMOGLOBIN: 10.8 GM/DL (ref 12.5–16)
IMMATURE NEUTROPHIL %: 0.8 % (ref 0–0.43)
LYMPHOCYTES ABSOLUTE: 2.4 K/CU MM
LYMPHOCYTES RELATIVE PERCENT: 31 % (ref 24–44)
MCH RBC QN AUTO: 29.8 PG (ref 27–31)
MCHC RBC AUTO-ENTMCNC: 32 % (ref 32–36)
MCV RBC AUTO: 92.8 FL (ref 78–100)
MONOCYTES ABSOLUTE: 0.6 K/CU MM
MONOCYTES RELATIVE PERCENT: 7.4 % (ref 0–4)
NUCLEATED RBC %: 0 %
PDW BLD-RTO: 12.6 % (ref 11.7–14.9)
PLATELET # BLD: 378 K/CU MM (ref 140–440)
PMV BLD AUTO: 8.4 FL (ref 7.5–11.1)
RBC # BLD: 3.63 M/CU MM (ref 4.2–5.4)
SEGMENTED NEUTROPHILS ABSOLUTE COUNT: 4.2 K/CU MM
SEGMENTED NEUTROPHILS RELATIVE PERCENT: 53.3 % (ref 36–66)
TOTAL IMMATURE NEUTOROPHIL: 0.06 K/CU MM
TOTAL NUCLEATED RBC: 0 K/CU MM
WBC # BLD: 7.9 K/CU MM (ref 4–10.5)

## 2020-08-22 PROCEDURE — 6370000000 HC RX 637 (ALT 250 FOR IP): Performed by: NURSE PRACTITIONER

## 2020-08-22 PROCEDURE — C9113 INJ PANTOPRAZOLE SODIUM, VIA: HCPCS | Performed by: SURGERY

## 2020-08-22 PROCEDURE — 85025 COMPLETE CBC W/AUTO DIFF WBC: CPT

## 2020-08-22 PROCEDURE — 6370000000 HC RX 637 (ALT 250 FOR IP): Performed by: SURGERY

## 2020-08-22 PROCEDURE — 94761 N-INVAS EAR/PLS OXIMETRY MLT: CPT

## 2020-08-22 PROCEDURE — 6360000002 HC RX W HCPCS: Performed by: SURGERY

## 2020-08-22 PROCEDURE — 2580000003 HC RX 258: Performed by: SURGERY

## 2020-08-22 PROCEDURE — 99024 POSTOP FOLLOW-UP VISIT: CPT | Performed by: SURGERY

## 2020-08-22 PROCEDURE — 94640 AIRWAY INHALATION TREATMENT: CPT

## 2020-08-22 PROCEDURE — 6370000000 HC RX 637 (ALT 250 FOR IP): Performed by: HOSPITALIST

## 2020-08-22 PROCEDURE — 2700000000 HC OXYGEN THERAPY PER DAY

## 2020-08-22 PROCEDURE — 2140000000 HC CCU INTERMEDIATE R&B

## 2020-08-22 PROCEDURE — 6360000002 HC RX W HCPCS: Performed by: PHYSICIAN ASSISTANT

## 2020-08-22 RX ORDER — PROMETHAZINE HYDROCHLORIDE 25 MG/ML
6.25 INJECTION, SOLUTION INTRAMUSCULAR; INTRAVENOUS EVERY 6 HOURS PRN
Status: DISCONTINUED | OUTPATIENT
Start: 2020-08-22 | End: 2020-08-24 | Stop reason: HOSPADM

## 2020-08-22 RX ORDER — OXYCODONE HYDROCHLORIDE AND ACETAMINOPHEN 5; 325 MG/1; MG/1
1 TABLET ORAL EVERY 6 HOURS PRN
Qty: 25 TABLET | Refills: 0 | Status: SHIPPED | OUTPATIENT
Start: 2020-08-22 | End: 2020-08-27

## 2020-08-22 RX ORDER — MORPHINE SULFATE 2 MG/ML
2 INJECTION, SOLUTION INTRAMUSCULAR; INTRAVENOUS EVERY 4 HOURS PRN
Status: COMPLETED | OUTPATIENT
Start: 2020-08-22 | End: 2020-08-23

## 2020-08-22 RX ORDER — METOCLOPRAMIDE 10 MG/1
10 TABLET ORAL
Status: DISCONTINUED | OUTPATIENT
Start: 2020-08-22 | End: 2020-08-24 | Stop reason: HOSPADM

## 2020-08-22 RX ORDER — LORAZEPAM 1 MG/1
1 TABLET ORAL EVERY 6 HOURS PRN
Status: DISCONTINUED | OUTPATIENT
Start: 2020-08-22 | End: 2020-08-24 | Stop reason: HOSPADM

## 2020-08-22 RX ADMIN — MORPHINE SULFATE 4 MG: 4 INJECTION, SOLUTION INTRAMUSCULAR; INTRAVENOUS at 06:32

## 2020-08-22 RX ADMIN — OXYCODONE AND ACETAMINOPHEN 1 TABLET: 5; 325 TABLET ORAL at 04:20

## 2020-08-22 RX ADMIN — ASPIRIN 81 MG CHEWABLE TABLET 81 MG: 81 TABLET CHEWABLE at 08:37

## 2020-08-22 RX ADMIN — SODIUM CHLORIDE, PRESERVATIVE FREE 10 ML: 5 INJECTION INTRAVENOUS at 08:39

## 2020-08-22 RX ADMIN — LORAZEPAM 1 MG: 1 TABLET ORAL at 15:48

## 2020-08-22 RX ADMIN — CLOPIDOGREL BISULFATE 75 MG: 75 TABLET ORAL at 08:37

## 2020-08-22 RX ADMIN — GABAPENTIN 800 MG: 400 CAPSULE ORAL at 14:43

## 2020-08-22 RX ADMIN — OXYCODONE AND ACETAMINOPHEN 1 TABLET: 5; 325 TABLET ORAL at 17:01

## 2020-08-22 RX ADMIN — CARVEDILOL 25 MG: 25 TABLET, FILM COATED ORAL at 17:01

## 2020-08-22 RX ADMIN — LEVOTHYROXINE SODIUM 100 MCG: 100 TABLET ORAL at 06:32

## 2020-08-22 RX ADMIN — CARVEDILOL 25 MG: 25 TABLET, FILM COATED ORAL at 06:32

## 2020-08-22 RX ADMIN — ESCITALOPRAM OXALATE 20 MG: 10 TABLET ORAL at 08:37

## 2020-08-22 RX ADMIN — MORPHINE SULFATE 2 MG: 2 INJECTION, SOLUTION INTRAMUSCULAR; INTRAVENOUS at 20:23

## 2020-08-22 RX ADMIN — PROMETHAZINE HYDROCHLORIDE 12.5 MG: 25 TABLET ORAL at 12:46

## 2020-08-22 RX ADMIN — BUDESONIDE AND FORMOTEROL FUMARATE DIHYDRATE 2 PUFF: 160; 4.5 AEROSOL RESPIRATORY (INHALATION) at 08:30

## 2020-08-22 RX ADMIN — METOCLOPRAMIDE 10 MG: 5 INJECTION, SOLUTION INTRAMUSCULAR; INTRAVENOUS at 06:32

## 2020-08-22 RX ADMIN — MORPHINE SULFATE 4 MG: 4 INJECTION, SOLUTION INTRAMUSCULAR; INTRAVENOUS at 11:00

## 2020-08-22 RX ADMIN — LORAZEPAM 1 MG: 1 TABLET ORAL at 22:05

## 2020-08-22 RX ADMIN — SODIUM CHLORIDE, PRESERVATIVE FREE 10 ML: 5 INJECTION INTRAVENOUS at 08:38

## 2020-08-22 RX ADMIN — OXYCODONE AND ACETAMINOPHEN 1 TABLET: 5; 325 TABLET ORAL at 20:59

## 2020-08-22 RX ADMIN — GABAPENTIN 800 MG: 400 CAPSULE ORAL at 08:37

## 2020-08-22 RX ADMIN — SODIUM CHLORIDE, POTASSIUM CHLORIDE, SODIUM LACTATE AND CALCIUM CHLORIDE: 600; 310; 30; 20 INJECTION, SOLUTION INTRAVENOUS at 06:34

## 2020-08-22 RX ADMIN — AMLODIPINE BESYLATE 10 MG: 10 TABLET ORAL at 08:38

## 2020-08-22 RX ADMIN — OXYCODONE AND ACETAMINOPHEN 1 TABLET: 5; 325 TABLET ORAL at 08:38

## 2020-08-22 RX ADMIN — BUDESONIDE AND FORMOTEROL FUMARATE DIHYDRATE 2 PUFF: 160; 4.5 AEROSOL RESPIRATORY (INHALATION) at 21:38

## 2020-08-22 RX ADMIN — LORAZEPAM 1 MG: 2 INJECTION INTRAMUSCULAR; INTRAVENOUS at 06:32

## 2020-08-22 RX ADMIN — PANTOPRAZOLE SODIUM 40 MG: 40 INJECTION, POWDER, FOR SOLUTION INTRAVENOUS at 08:37

## 2020-08-22 RX ADMIN — ONDANSETRON 4 MG: 2 INJECTION INTRAMUSCULAR; INTRAVENOUS at 14:43

## 2020-08-22 RX ADMIN — OXYCODONE AND ACETAMINOPHEN 1 TABLET: 5; 325 TABLET ORAL at 12:45

## 2020-08-22 RX ADMIN — MORPHINE SULFATE 4 MG: 4 INJECTION, SOLUTION INTRAMUSCULAR; INTRAVENOUS at 01:11

## 2020-08-22 RX ADMIN — SODIUM CHLORIDE, PRESERVATIVE FREE 10 ML: 5 INJECTION INTRAVENOUS at 20:23

## 2020-08-22 RX ADMIN — METOCLOPRAMIDE 10 MG: 10 TABLET ORAL at 15:48

## 2020-08-22 RX ADMIN — ONDANSETRON 4 MG: 2 INJECTION INTRAMUSCULAR; INTRAVENOUS at 06:32

## 2020-08-22 RX ADMIN — GABAPENTIN 800 MG: 400 CAPSULE ORAL at 20:23

## 2020-08-22 ASSESSMENT — PAIN DESCRIPTION - PROGRESSION
CLINICAL_PROGRESSION: RESOLVED

## 2020-08-22 ASSESSMENT — PAIN DESCRIPTION - FREQUENCY
FREQUENCY: CONTINUOUS

## 2020-08-22 ASSESSMENT — PAIN SCALES - GENERAL
PAINLEVEL_OUTOF10: 6
PAINLEVEL_OUTOF10: 3
PAINLEVEL_OUTOF10: 10
PAINLEVEL_OUTOF10: 3
PAINLEVEL_OUTOF10: 10
PAINLEVEL_OUTOF10: 10
PAINLEVEL_OUTOF10: 9
PAINLEVEL_OUTOF10: 10

## 2020-08-22 ASSESSMENT — PAIN DESCRIPTION - ORIENTATION
ORIENTATION: LOWER;MID

## 2020-08-22 ASSESSMENT — PAIN - FUNCTIONAL ASSESSMENT
PAIN_FUNCTIONAL_ASSESSMENT: PREVENTS OR INTERFERES SOME ACTIVE ACTIVITIES AND ADLS

## 2020-08-22 ASSESSMENT — PAIN DESCRIPTION - PAIN TYPE
TYPE: SURGICAL PAIN

## 2020-08-22 ASSESSMENT — PAIN DESCRIPTION - ONSET
ONSET: ON-GOING

## 2020-08-22 ASSESSMENT — PAIN DESCRIPTION - DESCRIPTORS
DESCRIPTORS: ACHING

## 2020-08-22 ASSESSMENT — PAIN DESCRIPTION - LOCATION
LOCATION: ABDOMEN

## 2020-08-22 NOTE — PLAN OF CARE
Problem: Pain:  Goal: Pain level will decrease  Description: Pain level will decrease  Outcome: Ongoing  Goal: Control of acute pain  Description: Control of acute pain  Outcome: Ongoing  Goal: Control of chronic pain  Description: Control of chronic pain  Outcome: Ongoing  Goal: Patient's pain/discomfort is manageable  Description: Patient's pain/discomfort is manageable  Outcome: Ongoing     Problem: Infection:  Goal: Will remain free from infection  Description: Will remain free from infection  Outcome: Ongoing     Problem: Safety:  Goal: Free from accidental physical injury  Description: Free from accidental physical injury  Outcome: Ongoing  Goal: Free from intentional harm  Description: Free from intentional harm  Outcome: Ongoing     Problem: Daily Care:  Goal: Daily care needs are met  Description: Daily care needs are met  Outcome: Ongoing     Problem: Skin Integrity:  Goal: Skin integrity will stabilize  Description: Skin integrity will stabilize  Outcome: Ongoing     Problem: Discharge Planning:  Goal: Patients continuum of care needs are met  Description: Patients continuum of care needs are met  Outcome: Ongoing     Problem: Nutritional:  Goal: Nutritional status will improve  Description: Nutritional status will improve  Outcome: Ongoing  Goal: Ability to follow a diet with enough fiber (20 to 30 grams) for normal bowel function will improve  Description: Ability to follow a diet with enough fiber (20 to 30 grams) for normal bowel function will improve  Outcome: Ongoing     Problem: Physical Regulation:  Goal: Will remain free from infection  Description: Will remain free from infection  Outcome: Ongoing  Goal: Diagnostic test results will improve  Description: Diagnostic test results will improve  Outcome: Ongoing  Goal: Ability to maintain vital signs within normal range will improve  Description: Ability to maintain vital signs within normal range will improve  Outcome: Ongoing  Goal: Complications related to the disease process, condition or treatment will be avoided or minimized  Description: Complications related to the disease process, condition or treatment will be avoided or minimized  Outcome: Ongoing  Goal: Ability to maintain clinical measurements within normal limits will improve  Description: Ability to maintain clinical measurements within normal limits will improve  Outcome: Ongoing     Problem: Respiratory:  Goal: Ability to maintain normal respiratory secretions will improve  Description: Ability to maintain normal respiratory secretions will improve  Outcome: Ongoing     Problem: Skin Integrity:  Goal: Demonstration of wound healing without infection will improve  Description: Demonstration of wound healing without infection will improve  Outcome: Ongoing  Goal: Complications related to intravenous access or infusion will be avoided or minimized  Description: Complications related to intravenous access or infusion will be avoided or minimized  Outcome: Ongoing  Goal: Risk for impaired skin integrity will decrease  Description: Risk for impaired skin integrity will decrease  Outcome: Ongoing     Problem: Activity:  Goal: Risk for activity intolerance will decrease  Description: Risk for activity intolerance will decrease  Outcome: Ongoing     Problem:  Bowel/Gastric:  Goal: Diagnostic test results will improve  Description: Diagnostic test results will improve  Outcome: Ongoing  Goal: Bowel function will improve  Description: Bowel function will improve  Outcome: Ongoing  Goal: Occurrences of nausea will decrease  Description: Occurrences of nausea will decrease  Outcome: Ongoing  Goal: Occurrences of vomiting will decrease  Description: Occurrences of vomiting will decrease  Outcome: Ongoing  Goal: Control of bowel function will improve  Description: Control of bowel function will improve  Outcome: Ongoing  Goal: Ability to achieve a regular elimination pattern will improve  Description: Ability to achieve a regular elimination pattern will improve  Outcome: Ongoing     Problem: Fluid Volume:  Goal: Maintenance of adequate hydration will improve  Description: Maintenance of adequate hydration will improve  Outcome: Ongoing     Problem: Health Behavior:  Goal: Ability to state signs and symptoms to report to health care provider will improve  Description: Ability to state signs and symptoms to report to health care provider will improve  Outcome: Ongoing     Problem: Sensory:  Goal: Pain level will decrease  Description: Pain level will decrease  Outcome: Ongoing  Goal: Ability to identify factors that increase the pain will improve  Description: Ability to identify factors that increase the pain will improve  Outcome: Ongoing  Goal: Ability to notify healthcare provider of pain before it becomes unmanageable or unbearable will improve  Description: Ability to notify healthcare provider of pain before it becomes unmanageable or unbearable will improve  Outcome: Ongoing     Problem: Falls - Risk of:  Goal: Will remain free from falls  Description: Will remain free from falls  Outcome: Ongoing  Goal: Absence of physical injury  Description: Absence of physical injury  Outcome: Ongoing

## 2020-08-22 NOTE — PROGRESS NOTES
Hospitalist Progress Note      Name:  Penelope Tripp /Age/Sex: 1960  (62 y.o. female)   MRN & CSN:  [id number] & [id number] Admission Date/Time: 2020  8:42 PM   [facility] PCP: Ermelinda Carrington. Alex Mendez 59 Day: 12    Assessment and Plan:   Penelope Tripp is a 62 y.o.  female  who presents with <principal problem not specified>    > mid transverse colon stricture  CT scan with abrupt narrowing involving the splenic flexure/distal transverse colon with mucosal thickening and pericolonic stranding, caused bowel obstruction  GI evaluated, s/p colonoscopy. Not able to pass scope beyond stricture, recommended surgical resection-need outpatient follow-up for repeat C scope in 6 months. holding DAPT-continue on Integrilin by cardiology. - s/p Left hemicolectomy   - NG tube removed, diet advanced, IV pain meds switched to po   - tolerating po intake, had BM, improving abd pain.      >H/o amphetamine use     >CAD s/p recent PCI  - cardiology consulted, justin op management  Holding ASA, plavix for surgery  Increased dose of Coreg.     >Hypothyroidism  Continue Synthroid.     Hepatitis C  HLD  COPD     HTN-continue Norvasc, Coreg. BP higher side. Nicotine abuse    Diet Dietary Nutrition Supplements: Standard High Calorie Oral Supplement  DIET GENERAL; Dental Soft   DVT Prophylaxis ? Lovenox once ok by surgery   GI Prophylaxis ? PPI   Code Status Full Code   Disposition  Home with [facility] pending surgery clearance. Anticipate on Monday     History of Present Illness:     Pt S&E. No F/C, no N/V, no chest pain, no dyspnea. 10-14 point ROS reviewed negative, unless as noted above    Objective:        Intake/Output Summary (Last 24 hours) at 2020 0921  Last data filed at 2020 0423  Gross per 24 hour   Intake 840 ml   Output 210 ml   Net 630 ml      Vitals:   Vitals:    20 0837   BP: 128/67   Pulse:    Resp:    Temp:    SpO2:      Physical Exam:      GEN Awake female, cooperative , no apparent distress. RESP Decreased air sounds. Symmetric chest movement . CARDIO/VASC S1/S2 auscultated. Regular rate. GI Abdomen is soft , post op . MSK Spontaneous movement of all extremities  SKIN Normal coloration, warm, dry. NEURO no lateralizing weakness. PSYCH Awake, alert, cooperative. Affect appropriate.     Medications:   Medications:    clopidogrel  75 mg Oral Daily    aspirin  81 mg Oral Daily    carvedilol  25 mg Oral BID WC    amLODIPine  10 mg Oral Daily    nicotine  1 patch Transdermal Daily    sodium chloride flush  10 mL Intravenous 2 times per day    escitalopram  20 mg Oral Daily    levothyroxine  100 mcg Oral Daily    budesonide-formoterol  2 puff Inhalation BID    gabapentin  800 mg Oral TID    pantoprazole  40 mg Intravenous Daily    metoclopramide  10 mg Intravenous Q8H    ondansetron  4 mg Intravenous Q8H    sodium chloride flush  10 mL Intravenous BID      Infusions:    lactated ringers 100 mL/hr at 08/22/20 0634     PRN Meds: oxyCODONE-acetaminophen, 1 tablet, Q4H PRN  morphine, 2 mg, Q2H PRN    Or  morphine, 4 mg, Q2H PRN  LORazepam, 1 mg, Q6H PRN  hydrALAZINE (APRESOLINE) ivpb, 10 mg, Q4H PRN  sodium chloride flush, 10 mL, PRN  acetaminophen, 650 mg, Q6H PRN    Or  acetaminophen, 650 mg, Q6H PRN  magnesium hydroxide, 30 mL, Daily PRN  promethazine, 12.5 mg, Q6H PRN          Electronically signed by Kenyatta Ro MD on 8/22/2020 at 9:21 AM

## 2020-08-22 NOTE — PROGRESS NOTES
Dr Ahumada  in to see patient at 0 and surgeon changed dressing and states patient may discharge later today or [place], Also spoke with Dr Loreatha Duverney with patient update and to report patient c/o some tenderness to IV site and previous shift inquired regarding IV team to place new site,  Dr Loreatha Duverney states no new IV required, new orders rec'd to stop IVF, no IVP PRN medications for pain, transition to po pain meds for pending discharge to home, also states if IV comes out may leave it out and no new IV required

## 2020-08-22 NOTE — PROGRESS NOTES
Problem List:     Colitis     Acute abdominal pain     Severe malnutrition (HCC)     Generalized abdominal pain      - drain teaching  - continue current pain regimen  - ok for discharge from a surgical perspective later today or tomorrow  - will send with percocet for pain  - follow up with Dr. Gerald Davis in clinic    Vito Goodman MD

## 2020-08-23 LAB
BASOPHILS ABSOLUTE: 0.1 K/CU MM
BASOPHILS RELATIVE PERCENT: 0.6 % (ref 0–1)
DIFFERENTIAL TYPE: ABNORMAL
EOSINOPHILS ABSOLUTE: 0.5 K/CU MM
EOSINOPHILS RELATIVE PERCENT: 6.2 % (ref 0–3)
HCT VFR BLD CALC: 31.7 % (ref 37–47)
HEMOGLOBIN: 10.3 GM/DL (ref 12.5–16)
IMMATURE NEUTROPHIL %: 0.6 % (ref 0–0.43)
LYMPHOCYTES ABSOLUTE: 2.3 K/CU MM
LYMPHOCYTES RELATIVE PERCENT: 29.4 % (ref 24–44)
MCH RBC QN AUTO: 30 PG (ref 27–31)
MCHC RBC AUTO-ENTMCNC: 32.5 % (ref 32–36)
MCV RBC AUTO: 92.4 FL (ref 78–100)
MONOCYTES ABSOLUTE: 0.6 K/CU MM
MONOCYTES RELATIVE PERCENT: 8 % (ref 0–4)
NUCLEATED RBC %: 0 %
PDW BLD-RTO: 12.5 % (ref 11.7–14.9)
PLATELET # BLD: 328 K/CU MM (ref 140–440)
PMV BLD AUTO: 8.4 FL (ref 7.5–11.1)
RBC # BLD: 3.43 M/CU MM (ref 4.2–5.4)
SEGMENTED NEUTROPHILS ABSOLUTE COUNT: 4.3 K/CU MM
SEGMENTED NEUTROPHILS RELATIVE PERCENT: 55.2 % (ref 36–66)
TOTAL IMMATURE NEUTOROPHIL: 0.05 K/CU MM
TOTAL NUCLEATED RBC: 0 K/CU MM
WBC # BLD: 7.9 K/CU MM (ref 4–10.5)

## 2020-08-23 PROCEDURE — 6360000002 HC RX W HCPCS: Performed by: SURGERY

## 2020-08-23 PROCEDURE — 6360000002 HC RX W HCPCS: Performed by: HOSPITALIST

## 2020-08-23 PROCEDURE — 6370000000 HC RX 637 (ALT 250 FOR IP): Performed by: SURGERY

## 2020-08-23 PROCEDURE — 99024 POSTOP FOLLOW-UP VISIT: CPT | Performed by: SURGERY

## 2020-08-23 PROCEDURE — 2140000000 HC CCU INTERMEDIATE R&B

## 2020-08-23 PROCEDURE — 6360000002 HC RX W HCPCS: Performed by: PHYSICIAN ASSISTANT

## 2020-08-23 PROCEDURE — 2700000000 HC OXYGEN THERAPY PER DAY

## 2020-08-23 PROCEDURE — 6370000000 HC RX 637 (ALT 250 FOR IP): Performed by: HOSPITALIST

## 2020-08-23 PROCEDURE — C9113 INJ PANTOPRAZOLE SODIUM, VIA: HCPCS | Performed by: SURGERY

## 2020-08-23 PROCEDURE — 6370000000 HC RX 637 (ALT 250 FOR IP): Performed by: NURSE PRACTITIONER

## 2020-08-23 PROCEDURE — 94761 N-INVAS EAR/PLS OXIMETRY MLT: CPT

## 2020-08-23 PROCEDURE — 2580000003 HC RX 258: Performed by: SURGERY

## 2020-08-23 PROCEDURE — 85025 COMPLETE CBC W/AUTO DIFF WBC: CPT

## 2020-08-23 PROCEDURE — 94640 AIRWAY INHALATION TREATMENT: CPT

## 2020-08-23 PROCEDURE — 94150 VITAL CAPACITY TEST: CPT

## 2020-08-23 RX ORDER — OXYCODONE HYDROCHLORIDE 5 MG/1
5 TABLET ORAL EVERY 4 HOURS PRN
Status: DISCONTINUED | OUTPATIENT
Start: 2020-08-23 | End: 2020-08-24 | Stop reason: HOSPADM

## 2020-08-23 RX ORDER — OXYCODONE HYDROCHLORIDE 10 MG/1
10 TABLET ORAL EVERY 4 HOURS PRN
Status: DISCONTINUED | OUTPATIENT
Start: 2020-08-23 | End: 2020-08-24 | Stop reason: HOSPADM

## 2020-08-23 RX ORDER — MORPHINE SULFATE 2 MG/ML
2 INJECTION, SOLUTION INTRAMUSCULAR; INTRAVENOUS ONCE
Status: COMPLETED | OUTPATIENT
Start: 2020-08-23 | End: 2020-08-23

## 2020-08-23 RX ADMIN — METOCLOPRAMIDE 10 MG: 10 TABLET ORAL at 06:46

## 2020-08-23 RX ADMIN — ACETAMINOPHEN 650 MG: 325 TABLET ORAL at 15:16

## 2020-08-23 RX ADMIN — OXYCODONE HYDROCHLORIDE 10 MG: 10 TABLET ORAL at 10:54

## 2020-08-23 RX ADMIN — LORAZEPAM 1 MG: 1 TABLET ORAL at 17:22

## 2020-08-23 RX ADMIN — MORPHINE SULFATE 2 MG: 2 INJECTION, SOLUTION INTRAMUSCULAR; INTRAVENOUS at 00:25

## 2020-08-23 RX ADMIN — GABAPENTIN 800 MG: 400 CAPSULE ORAL at 19:56

## 2020-08-23 RX ADMIN — OXYCODONE AND ACETAMINOPHEN 1 TABLET: 5; 325 TABLET ORAL at 05:08

## 2020-08-23 RX ADMIN — OXYCODONE HYDROCHLORIDE 10 MG: 10 TABLET ORAL at 23:59

## 2020-08-23 RX ADMIN — LORAZEPAM 1 MG: 1 TABLET ORAL at 23:27

## 2020-08-23 RX ADMIN — LORAZEPAM 1 MG: 1 TABLET ORAL at 04:28

## 2020-08-23 RX ADMIN — SODIUM CHLORIDE, PRESERVATIVE FREE 10 ML: 5 INJECTION INTRAVENOUS at 19:56

## 2020-08-23 RX ADMIN — GABAPENTIN 800 MG: 400 CAPSULE ORAL at 15:19

## 2020-08-23 RX ADMIN — MORPHINE SULFATE 2 MG: 2 INJECTION, SOLUTION INTRAMUSCULAR; INTRAVENOUS at 09:28

## 2020-08-23 RX ADMIN — OXYCODONE HYDROCHLORIDE 10 MG: 10 TABLET ORAL at 15:16

## 2020-08-23 RX ADMIN — CLOPIDOGREL BISULFATE 75 MG: 75 TABLET ORAL at 09:29

## 2020-08-23 RX ADMIN — PANTOPRAZOLE SODIUM 40 MG: 40 INJECTION, POWDER, FOR SOLUTION INTRAVENOUS at 09:28

## 2020-08-23 RX ADMIN — CARVEDILOL 25 MG: 25 TABLET, FILM COATED ORAL at 17:22

## 2020-08-23 RX ADMIN — METOCLOPRAMIDE 10 MG: 10 TABLET ORAL at 10:54

## 2020-08-23 RX ADMIN — LEVOTHYROXINE SODIUM 100 MCG: 100 TABLET ORAL at 06:46

## 2020-08-23 RX ADMIN — METOCLOPRAMIDE 10 MG: 10 TABLET ORAL at 17:22

## 2020-08-23 RX ADMIN — PROMETHAZINE HYDROCHLORIDE 6.25 MG: 25 INJECTION INTRAMUSCULAR; INTRAVENOUS at 23:26

## 2020-08-23 RX ADMIN — PROMETHAZINE HYDROCHLORIDE 12.5 MG: 25 TABLET ORAL at 15:19

## 2020-08-23 RX ADMIN — MORPHINE SULFATE 2 MG: 2 INJECTION, SOLUTION INTRAMUSCULAR; INTRAVENOUS at 04:28

## 2020-08-23 RX ADMIN — OXYCODONE HYDROCHLORIDE 10 MG: 10 TABLET ORAL at 19:56

## 2020-08-23 RX ADMIN — AMLODIPINE BESYLATE 10 MG: 10 TABLET ORAL at 09:29

## 2020-08-23 RX ADMIN — CARVEDILOL 25 MG: 25 TABLET, FILM COATED ORAL at 09:29

## 2020-08-23 RX ADMIN — OXYCODONE AND ACETAMINOPHEN 1 TABLET: 5; 325 TABLET ORAL at 01:00

## 2020-08-23 RX ADMIN — LORAZEPAM 1 MG: 1 TABLET ORAL at 10:54

## 2020-08-23 RX ADMIN — ASPIRIN 81 MG CHEWABLE TABLET 81 MG: 81 TABLET CHEWABLE at 09:28

## 2020-08-23 RX ADMIN — BUDESONIDE AND FORMOTEROL FUMARATE DIHYDRATE 2 PUFF: 160; 4.5 AEROSOL RESPIRATORY (INHALATION) at 19:37

## 2020-08-23 RX ADMIN — ESCITALOPRAM OXALATE 20 MG: 10 TABLET ORAL at 09:29

## 2020-08-23 RX ADMIN — GABAPENTIN 800 MG: 400 CAPSULE ORAL at 09:29

## 2020-08-23 RX ADMIN — SODIUM CHLORIDE, PRESERVATIVE FREE 10 ML: 5 INJECTION INTRAVENOUS at 09:29

## 2020-08-23 RX ADMIN — ACETAMINOPHEN 650 MG: 325 TABLET ORAL at 10:54

## 2020-08-23 ASSESSMENT — PAIN SCALES - GENERAL
PAINLEVEL_OUTOF10: 10
PAINLEVEL_OUTOF10: 10
PAINLEVEL_OUTOF10: 0
PAINLEVEL_OUTOF10: 6
PAINLEVEL_OUTOF10: 10
PAINLEVEL_OUTOF10: 2
PAINLEVEL_OUTOF10: 2
PAINLEVEL_OUTOF10: 10
PAINLEVEL_OUTOF10: 6
PAINLEVEL_OUTOF10: 10
PAINLEVEL_OUTOF10: 7
PAINLEVEL_OUTOF10: 6
PAINLEVEL_OUTOF10: 0
PAINLEVEL_OUTOF10: 9
PAINLEVEL_OUTOF10: 0
PAINLEVEL_OUTOF10: 2
PAINLEVEL_OUTOF10: 0

## 2020-08-23 ASSESSMENT — PAIN DESCRIPTION - LOCATION
LOCATION: ABDOMEN

## 2020-08-23 ASSESSMENT — PAIN DESCRIPTION - DESCRIPTORS
DESCRIPTORS: ACHING

## 2020-08-23 ASSESSMENT — PAIN DESCRIPTION - ONSET
ONSET: ON-GOING

## 2020-08-23 ASSESSMENT — PAIN - FUNCTIONAL ASSESSMENT
PAIN_FUNCTIONAL_ASSESSMENT: PREVENTS OR INTERFERES SOME ACTIVE ACTIVITIES AND ADLS

## 2020-08-23 ASSESSMENT — PAIN DESCRIPTION - ORIENTATION
ORIENTATION: LOWER;MID

## 2020-08-23 ASSESSMENT — PAIN DESCRIPTION - PAIN TYPE
TYPE: SURGICAL PAIN

## 2020-08-23 ASSESSMENT — PAIN DESCRIPTION - FREQUENCY
FREQUENCY: CONTINUOUS

## 2020-08-23 ASSESSMENT — PAIN DESCRIPTION - PROGRESSION
CLINICAL_PROGRESSION: RESOLVED

## 2020-08-23 NOTE — PROGRESS NOTES
Hospitalist Progress Note      Name:  Tiffani Orosco /Age/Sex: 1960  (62 y.o. female)   MRN & CSN:  [id number] & [id number] Admission Date/Time: 2020  8:42 PM   [facility]  [facility] PCP: Dahlia Carballo Day: 13    Assessment and Plan:   Tiffani Orosco is a 62 y.o.  female  who presents with <principal problem not specified>    > mid transverse colon stricture  CT scan with abrupt narrowing involving the splenic flexure/distal transverse colon with mucosal thickening and pericolonic stranding, caused bowel obstruction  GI evaluated, s/p colonoscopy. Not able to pass scope beyond stricture, recommended surgical resection-need outpatient follow-up for repeat C scope in 6 months. holding DAPT-continue on Integrilin by cardiology. - s/p Left hemicolectomy   - NG tube removed, diet advanced, IV pain meds switched to po   - tolerating po intake, had BM, improved abd pain. DC IV meds since pt getting ready for discharge home tomorrow, discussed with pt .      >H/o amphetamine use     >CAD s/p recent PCI  - cardiology consulted, justin op management  Holding ASA, plavix for surgery  Increased dose of Coreg.     >Hypothyroidism  Continue Synthroid.     Hepatitis C  HLD  COPD     HTN-continue Norvasc, Coreg. BP higher side. Nicotine abuse    Diet Dietary Nutrition Supplements: Standard High Calorie Oral Supplement  DIET GENERAL; Dental Soft   DVT Prophylaxis ? Lovenox once ok by surgery   GI Prophylaxis ? PPI   Code Status Full Code   Disposition  Home with , Anticipate on Monday     History of Present Illness:     Pt S&E. Improving abd pain, no chest pain, no dyspnea, no F/c.     10-14 point ROS reviewed negative, unless as noted above    Objective:        Intake/Output Summary (Last 24 hours) at 202037  Last data filed at 2020  Gross per 24 hour   Intake --   Output 85 ml   Net -85 ml      Vitals:   Vitals:    20   BP: 109/74   Pulse: 66   Resp: 20   Temp: 98.5 °F (36.9 °C)   SpO2: 95%     Physical Exam:      GEN Awake female, cooperative , no apparent distress. RESP Decreased air sounds. Symmetric chest movement . CARDIO/VASC S1/S2 auscultated. Regular rate. GI Abdomen is soft , post op . MSK Spontaneous movement of all extremities  SKIN Normal coloration, warm, dry. NEURO no lateralizing weakness. PSYCH Awake, alert, cooperative. Affect appropriate.     Medications:   Medications:    metoclopramide  10 mg Oral TID AC    clopidogrel  75 mg Oral Daily    aspirin  81 mg Oral Daily    carvedilol  25 mg Oral BID WC    amLODIPine  10 mg Oral Daily    nicotine  1 patch Transdermal Daily    sodium chloride flush  10 mL Intravenous 2 times per day    escitalopram  20 mg Oral Daily    levothyroxine  100 mcg Oral Daily    budesonide-formoterol  2 puff Inhalation BID    gabapentin  800 mg Oral TID    pantoprazole  40 mg Intravenous Daily    sodium chloride flush  10 mL Intravenous BID      Infusions:     PRN Meds: LORazepam, 1 mg, Q6H PRN  promethazine, 6.25 mg, Q6H PRN  oxyCODONE-acetaminophen, 1 tablet, Q4H PRN  hydrALAZINE (APRESOLINE) ivpb, 10 mg, Q4H PRN  sodium chloride flush, 10 mL, PRN  acetaminophen, 650 mg, Q6H PRN    Or  acetaminophen, 650 mg, Q6H PRN  magnesium hydroxide, 30 mL, Daily PRN  promethazine, 12.5 mg, Q6H PRN          Electronically signed by Josiane Rios MD on 8/23/2020 at 8:37 AM

## 2020-08-23 NOTE — PROGRESS NOTES
discharge from a surgical perspective  - will send with percocet for pain  - follow up with Dr. Kiera Perez in clinic    Gina Moreau MD

## 2020-08-24 VITALS
BODY MASS INDEX: 30.35 KG/M2 | OXYGEN SATURATION: 97 % | SYSTOLIC BLOOD PRESSURE: 144 MMHG | DIASTOLIC BLOOD PRESSURE: 83 MMHG | HEIGHT: 62 IN | HEART RATE: 75 BPM | WEIGHT: 164.9 LBS | TEMPERATURE: 98.3 F | RESPIRATION RATE: 20 BRPM

## 2020-08-24 PROBLEM — K56.609 BOWEL OBSTRUCTION (HCC): Status: ACTIVE | Noted: 2020-08-24

## 2020-08-24 LAB
BASOPHILS ABSOLUTE: 0.1 K/CU MM
BASOPHILS RELATIVE PERCENT: 0.7 % (ref 0–1)
DIFFERENTIAL TYPE: ABNORMAL
EOSINOPHILS ABSOLUTE: 0.6 K/CU MM
EOSINOPHILS RELATIVE PERCENT: 6 % (ref 0–3)
HCT VFR BLD CALC: 37.9 % (ref 37–47)
HEMOGLOBIN: 11.8 GM/DL (ref 12.5–16)
IMMATURE NEUTROPHIL %: 0.7 % (ref 0–0.43)
LYMPHOCYTES ABSOLUTE: 2.3 K/CU MM
LYMPHOCYTES RELATIVE PERCENT: 25.1 % (ref 24–44)
MCH RBC QN AUTO: 29.3 PG (ref 27–31)
MCHC RBC AUTO-ENTMCNC: 31.1 % (ref 32–36)
MCV RBC AUTO: 94 FL (ref 78–100)
MONOCYTES ABSOLUTE: 0.5 K/CU MM
MONOCYTES RELATIVE PERCENT: 5.7 % (ref 0–4)
NUCLEATED RBC %: 0 %
PDW BLD-RTO: 12.5 % (ref 11.7–14.9)
PLATELET # BLD: 436 K/CU MM (ref 140–440)
PMV BLD AUTO: 8.3 FL (ref 7.5–11.1)
RBC # BLD: 4.03 M/CU MM (ref 4.2–5.4)
SEGMENTED NEUTROPHILS ABSOLUTE COUNT: 5.7 K/CU MM
SEGMENTED NEUTROPHILS RELATIVE PERCENT: 61.8 % (ref 36–66)
TOTAL IMMATURE NEUTOROPHIL: 0.06 K/CU MM
TOTAL NUCLEATED RBC: 0 K/CU MM
WBC # BLD: 9.2 K/CU MM (ref 4–10.5)

## 2020-08-24 PROCEDURE — 85025 COMPLETE CBC W/AUTO DIFF WBC: CPT

## 2020-08-24 PROCEDURE — 2700000000 HC OXYGEN THERAPY PER DAY

## 2020-08-24 PROCEDURE — 6370000000 HC RX 637 (ALT 250 FOR IP): Performed by: SURGERY

## 2020-08-24 PROCEDURE — 6370000000 HC RX 637 (ALT 250 FOR IP): Performed by: HOSPITALIST

## 2020-08-24 PROCEDURE — 2580000003 HC RX 258: Performed by: SURGERY

## 2020-08-24 PROCEDURE — 6360000002 HC RX W HCPCS: Performed by: HOSPITALIST

## 2020-08-24 PROCEDURE — 6370000000 HC RX 637 (ALT 250 FOR IP): Performed by: NURSE PRACTITIONER

## 2020-08-24 PROCEDURE — 94761 N-INVAS EAR/PLS OXIMETRY MLT: CPT

## 2020-08-24 PROCEDURE — 36415 COLL VENOUS BLD VENIPUNCTURE: CPT

## 2020-08-24 PROCEDURE — 6360000002 HC RX W HCPCS: Performed by: SURGERY

## 2020-08-24 PROCEDURE — C9113 INJ PANTOPRAZOLE SODIUM, VIA: HCPCS | Performed by: SURGERY

## 2020-08-24 RX ORDER — AMLODIPINE BESYLATE 10 MG/1
10 TABLET ORAL DAILY
Qty: 30 TABLET | Refills: 1 | Status: SHIPPED | OUTPATIENT
Start: 2020-08-24

## 2020-08-24 RX ORDER — ONDANSETRON 4 MG/1
4 TABLET, FILM COATED ORAL 3 TIMES DAILY PRN
Qty: 30 TABLET | Refills: 0 | Status: SHIPPED | OUTPATIENT
Start: 2020-08-24 | End: 2020-12-20

## 2020-08-24 RX ORDER — LORAZEPAM 0.5 MG/1
0.5 TABLET ORAL EVERY 8 HOURS PRN
Qty: 9 TABLET | Refills: 0 | Status: SHIPPED | OUTPATIENT
Start: 2020-08-24 | End: 2020-08-27

## 2020-08-24 RX ORDER — CARVEDILOL 25 MG/1
25 TABLET ORAL 2 TIMES DAILY WITH MEALS
Qty: 60 TABLET | Refills: 1 | Status: SHIPPED | OUTPATIENT
Start: 2020-08-24

## 2020-08-24 RX ORDER — DOCUSATE SODIUM 100 MG/1
100 CAPSULE, LIQUID FILLED ORAL 2 TIMES DAILY
Qty: 60 CAPSULE | Refills: 0 | Status: SHIPPED | OUTPATIENT
Start: 2020-08-24

## 2020-08-24 RX ADMIN — ESCITALOPRAM OXALATE 20 MG: 10 TABLET ORAL at 09:15

## 2020-08-24 RX ADMIN — METOCLOPRAMIDE 10 MG: 10 TABLET ORAL at 05:39

## 2020-08-24 RX ADMIN — LEVOTHYROXINE SODIUM 100 MCG: 100 TABLET ORAL at 05:39

## 2020-08-24 RX ADMIN — CLOPIDOGREL BISULFATE 75 MG: 75 TABLET ORAL at 09:15

## 2020-08-24 RX ADMIN — CARVEDILOL 25 MG: 25 TABLET, FILM COATED ORAL at 09:19

## 2020-08-24 RX ADMIN — PROMETHAZINE HYDROCHLORIDE 6.25 MG: 25 INJECTION INTRAMUSCULAR; INTRAVENOUS at 05:39

## 2020-08-24 RX ADMIN — METOCLOPRAMIDE 10 MG: 10 TABLET ORAL at 09:15

## 2020-08-24 RX ADMIN — OXYCODONE HYDROCHLORIDE 10 MG: 10 TABLET ORAL at 04:16

## 2020-08-24 RX ADMIN — OXYCODONE HYDROCHLORIDE 10 MG: 10 TABLET ORAL at 09:15

## 2020-08-24 RX ADMIN — AMLODIPINE BESYLATE 10 MG: 10 TABLET ORAL at 09:15

## 2020-08-24 RX ADMIN — ASPIRIN 81 MG CHEWABLE TABLET 81 MG: 81 TABLET CHEWABLE at 09:15

## 2020-08-24 RX ADMIN — PANTOPRAZOLE SODIUM 40 MG: 40 INJECTION, POWDER, FOR SOLUTION INTRAVENOUS at 09:14

## 2020-08-24 RX ADMIN — LORAZEPAM 1 MG: 1 TABLET ORAL at 05:38

## 2020-08-24 RX ADMIN — GABAPENTIN 800 MG: 400 CAPSULE ORAL at 09:15

## 2020-08-24 RX ADMIN — SODIUM CHLORIDE, PRESERVATIVE FREE 10 ML: 5 INJECTION INTRAVENOUS at 09:16

## 2020-08-24 ASSESSMENT — PAIN DESCRIPTION - DESCRIPTORS
DESCRIPTORS: ACHING

## 2020-08-24 ASSESSMENT — PAIN DESCRIPTION - ONSET
ONSET: ON-GOING

## 2020-08-24 ASSESSMENT — PAIN - FUNCTIONAL ASSESSMENT
PAIN_FUNCTIONAL_ASSESSMENT: PREVENTS OR INTERFERES SOME ACTIVE ACTIVITIES AND ADLS

## 2020-08-24 ASSESSMENT — PAIN DESCRIPTION - PAIN TYPE
TYPE: SURGICAL PAIN
TYPE: CHRONIC PAIN
TYPE: SURGICAL PAIN
TYPE: SURGICAL PAIN

## 2020-08-24 ASSESSMENT — PAIN DESCRIPTION - LOCATION
LOCATION: ABDOMEN
LOCATION: GENERALIZED
LOCATION: ABDOMEN
LOCATION: ABDOMEN

## 2020-08-24 ASSESSMENT — PAIN DESCRIPTION - ORIENTATION
ORIENTATION: LOWER;MID

## 2020-08-24 ASSESSMENT — PAIN SCALES - GENERAL
PAINLEVEL_OUTOF10: 0
PAINLEVEL_OUTOF10: 2
PAINLEVEL_OUTOF10: 10
PAINLEVEL_OUTOF10: 10

## 2020-08-24 ASSESSMENT — ENCOUNTER SYMPTOMS
ALLERGIC/IMMUNOLOGIC NEGATIVE: 1
RESPIRATORY NEGATIVE: 1
EYES NEGATIVE: 1
GASTROINTESTINAL NEGATIVE: 1

## 2020-08-24 ASSESSMENT — PAIN DESCRIPTION - PROGRESSION
CLINICAL_PROGRESSION: RESOLVED
CLINICAL_PROGRESSION: NOT CHANGED

## 2020-08-24 ASSESSMENT — PAIN DESCRIPTION - FREQUENCY
FREQUENCY: CONTINUOUS

## 2020-08-24 NOTE — PLAN OF CARE
Complications related to the disease process, condition or treatment will be avoided or minimized  Description: Complications related to the disease process, condition or treatment will be avoided or minimized  Outcome: Ongoing  Goal: Ability to maintain clinical measurements within normal limits will improve  Description: Ability to maintain clinical measurements within normal limits will improve  Outcome: Ongoing     Problem: Respiratory:  Goal: Ability to maintain normal respiratory secretions will improve  Description: Ability to maintain normal respiratory secretions will improve  Outcome: Ongoing     Problem: Skin Integrity:  Goal: Demonstration of wound healing without infection will improve  Description: Demonstration of wound healing without infection will improve  Outcome: Ongoing  Goal: Complications related to intravenous access or infusion will be avoided or minimized  Description: Complications related to intravenous access or infusion will be avoided or minimized  Outcome: Ongoing  Goal: Risk for impaired skin integrity will decrease  Description: Risk for impaired skin integrity will decrease  Outcome: Ongoing     Problem: Activity:  Goal: Risk for activity intolerance will decrease  Description: Risk for activity intolerance will decrease  Outcome: Ongoing     Problem:  Bowel/Gastric:  Goal: Diagnostic test results will improve  Description: Diagnostic test results will improve  Outcome: Ongoing  Goal: Bowel function will improve  Description: Bowel function will improve  Outcome: Ongoing  Goal: Occurrences of nausea will decrease  Description: Occurrences of nausea will decrease  Outcome: Ongoing  Goal: Occurrences of vomiting will decrease  Description: Occurrences of vomiting will decrease  Outcome: Ongoing  Goal: Control of bowel function will improve  Description: Control of bowel function will improve  Outcome: Ongoing  Goal: Ability to achieve a regular elimination pattern will improve  Description: Ability to achieve a regular elimination pattern will improve  Outcome: Ongoing     Problem: Fluid Volume:  Goal: Maintenance of adequate hydration will improve  Description: Maintenance of adequate hydration will improve  Outcome: Ongoing     Problem: Health Behavior:  Goal: Ability to state signs and symptoms to report to health care provider will improve  Description: Ability to state signs and symptoms to report to health care provider will improve  Outcome: Ongoing     Problem: Sensory:  Goal: Pain level will decrease  Description: Pain level will decrease  Outcome: Ongoing  Goal: Ability to identify factors that increase the pain will improve  Description: Ability to identify factors that increase the pain will improve  Outcome: Ongoing  Goal: Ability to notify healthcare provider of pain before it becomes unmanageable or unbearable will improve  Description: Ability to notify healthcare provider of pain before it becomes unmanageable or unbearable will improve  Outcome: Ongoing     Problem: Falls - Risk of:  Goal: Will remain free from falls  Description: Will remain free from falls  Outcome: Ongoing  Goal: Absence of physical injury  Description: Absence of physical injury  Outcome: Ongoing

## 2020-08-24 NOTE — DISCHARGE SUMMARY
Discharge Summary    Name:  Gerson Yao /Age/Sex: 1960  (62 y.o. female)   MRN & CSN:  [id number] & [id number] Admission Date/Time: 2020  8:42 PM   Attending:  Rico Kramer MD Discharging Physician: Bernie Mayorga MD     HPI:     Gerson Yao is a 62 y.o. female admitted for abdominal pain this been going on for intermittently for 4 weeks, states that she has very little bowel movements, however just had one in the ED on a bedside commode. She states it is less than a finger length, and which has been her norm for about 4 months. Today her abdominal pain became worse and persistent, patient denies any other complaints, denies any nausea or vomiting. Pt otherwise has no complaints of CP, SOB, dizziness,  dysuria, joint pains, rash/boils, or fevers.      Hospital Course:   Gerson Yao is a 62 y.o.  female  who presents with Bowel obstruction (HCC)    > mid transverse colon stricture  CT scan with abrupt narrowing involving the splenic flexure/distal transverse colon with mucosal thickening and pericolonic stranding, caused bowel obstruction  GI evaluated, s/p colonoscopy. Not able to pass scope beyond stricture, recommended surgical resection-need outpatient follow-up for repeat C scope in 6 months. holding DAPT-continue on Integrilin by cardiology.    - s/p Left hemicolectomy   - NG tube removed, diet advanced, IV pain meds switched to po   - tolerating po intake, had BM, improved abd pain. - sx and clinically improved, cleared by surgery and was discharged home in a stable conditon     >H/o amphetamine use     >CAD s/p recent PCI  - cardiology consulted, justin op management  Holding ASA, plavix for surgery  Increased dose of Coreg.     >Hypothyroidism  Continue Synthroid.     Hepatitis C  HLD  COPD     HTN-continue Norvasc, Coreg.  BP higher side. Nicotine abuse    The patient expressed appropriate understanding of and agreement with the discharge recommendations, medications, and plan. Consults this admission:  IP CONSULT TO GENERAL SURGERY  IP CONSULT TO GI  IP CONSULT TO HOSPITALIST  IP CONSULT TO CARDIOLOGY  IP CONSULT TO IV TEAM    Discharge Instruction:   Follow up appointments:     See AVS    Disposition: Discharged to:   ? Home  Condition on discharge: Stable    Discharge Medications:      Dolores Cristina   Home Medication Instructions IDH:[id number]    Printed on:08/24/20 1017   Medication Information                      albuterol sulfate HFA (VENTOLIN HFA) 108 (90 Base) MCG/ACT inhaler  Inhale 1 puff into the lungs every 6 hours as needed for Wheezing             amLODIPine (NORVASC) 10 MG tablet  Take 1 tablet by mouth daily             aspirin 81 MG chewable tablet  Take 81 mg by mouth daily             atorvastatin (LIPITOR) 80 MG tablet  Take 80 mg by mouth nightly             carvedilol (COREG) 25 MG tablet  Take 1 tablet by mouth 2 times daily (with meals)             clopidogrel (PLAVIX) 75 MG tablet  Take 75 mg by mouth daily             docusate sodium (COLACE) 100 MG capsule  Take 1 capsule by mouth 2 times daily             escitalopram (LEXAPRO) 20 MG tablet  Take 20 mg by mouth daily             fluticasone-salmeterol (ADVAIR) 250-50 MCG/DOSE AEPB  Inhale 1 puff into the lungs 2 times daily             gabapentin (NEURONTIN) 800 MG tablet  Take 800 mg by mouth 3 times daily. levothyroxine (SYNTHROID) 100 MCG tablet  Take 100 mcg by mouth Daily             LORazepam (ATIVAN) 0.5 MG tablet  Take 1 tablet by mouth every 8 hours as needed for Anxiety for up to 3 days. mometasone-formoterol (DULERA) 100-5 MCG/ACT inhaler  Inhale 2 puffs into the lungs 2 times daily             ondansetron (ZOFRAN) 4 MG tablet  Take 1 tablet by mouth 3 times daily as needed for Nausea or Vomiting             oxyCODONE-acetaminophen (PERCOCET) 5-325 MG per tablet  Take 1 tablet by mouth every 6 hours as needed for Pain for up to 5 days. Intended supply: 5 days.  Take lowest dose possible to manage pain             promethazine (PHENERGAN) 25 MG tablet  Take 25 mg by mouth every 6 hours as needed for Nausea             traMADol (ULTRAM) 50 MG tablet  Take 50 mg by mouth every 6 hours as needed for Pain. traZODone (DESYREL) 50 MG tablet  Take 50 mg by mouth nightly                 Objective Findings at Discharge:   BP (!) 144/83   Pulse 75   Temp 98.3 °F (36.8 °C) (Oral)   Resp 20   Ht 5' 2\" (1.575 m)   Wt 164 lb 14.5 oz (74.8 kg)   SpO2 97%   BMI 30.16 kg/m²            PHYSICAL EXAM   GEN    Awake female, cooperative , no apparent distress. RESP  Decreased air sounds. Symmetric chest movement . CARDIO/VASC           S1/S2 auscultated. Regular rate. GI        Abdomen is soft , post op . MSK    Spontaneous movement of all extremities  SKIN    Normal coloration, warm, dry. NEURO           no lateralizing weakness. PSYCH            Awake, alert, cooperative. Affect appropriate.     BMP/CBC  Recent Labs     08/22/20  0526 08/23/20  0523 08/24/20  0358   WBC 7.9 7.9 9.2   HCT 33.7* 31.7* 37.9    328 436       IMAGING:    Xr Chest (2 Vw)    Result Date: 8/5/2020  EXAMINATION: TWO XRAY VIEWS OF THE CHEST 8/4/2020 9:44 pm COMPARISON: None HISTORY: ORDERING SYSTEM PROVIDED HISTORY: shortness of breath TECHNOLOGIST PROVIDED HISTORY: Reason for exam:->shortness of breath Reason for Exam: shortness of breath Acuity: Acute Type of Exam: Initial Acute shortness of breath. Initial encounter. FINDINGS: The cardiomediastinal silhouette is within normal range. Lungs are clear. There is no focal pulmonary consolidation, pleural effusion, pneumothorax, or evidence of airspace pulmonary edema. Patient has had a cholecystectomy and lower cervical fusion. No radiographic finding to account for patient's shortness of breath.      Ct Chest Wo Contrast    Result Date: 8/12/2020  EXAMINATION: CT OF THE CHEST WITHOUT CONTRAST 8/12/2020 7:09 pm TECHNIQUE: CT of the chest was performed without the administration of intravenous contrast. Multiplanar reformatted images are provided for review. Dose modulation, iterative reconstruction, and/or weight based adjustment of the mA/kV was utilized to reduce the radiation dose to as low as reasonably achievable. COMPARISON: Chest radiograph 08/04/2020. CT abdomen and pelvis 08/11/2020. HISTORY: ORDERING SYSTEM PROVIDED HISTORY: Findings of infiltrated on CT abdomen TECHNOLOGIST PROVIDED HISTORY: Reason for exam:->Findings of infiltrated on CT abdomen Reason for Exam: Findings of infiltrated on CT abdomen Acuity: Unknown Type of Exam: Unknown FINDINGS: Mediastinum: Lack of intravenous contrast limits evaluation of the mediastinum. The thoracic aorta is normal in caliber with mild calcific plaquing. Dense coronary artery atherosclerotic vascular calcifications are seen. The main pulmonary artery is normal in caliber. The heart is normal in size. No pericardial effusion. The mediastinal esophagus and thyroid gland are unremarkable. No lymphadenopathy. Lungs/pleura: The central airways are patent. No pleural effusion or pneumothorax. No consolidation or interlobular septal thickening. Mild bibasilar atelectasis. Focal area of irregular linear opacities in the periphery of the right middle lobe without significant change. Upper Abdomen: Segment of circumferentially narrowed distal transverse colon and splenic flexure with wall thickening and adjacent stranding unchanged from the previous exam with upstream colonic dilatation. Soft Tissues/Bones: No acute osseous or soft tissue abnormality     1. Small focal area of irregular linear opacities in the right middle lobe of uncertain etiology. This may represent scarring, atelectasis, or post infectious changes. 2. Dense coronary artery atherosclerotic vascular calcifications.  3. Stable appearance of a segment of circumferentially narrowed and thickened distal transverse colon and splenic flexure compared with the CT abdomen and pelvis on 08/11/2020. Ct Abdomen Pelvis W Iv Contrast Additional Contrast? None    Result Date: 8/12/2020  EXAMINATION: CT OF THE ABDOMEN AND PELVIS WITH CONTRAST 8/11/2020 10:17 pm TECHNIQUE: CT of the abdomen and pelvis was performed with the administration of intravenous contrast. Multiplanar reformatted images are provided for review. Dose modulation, iterative reconstruction, and/or weight based adjustment of the mA/kV was utilized to reduce the radiation dose to as low as reasonably achievable. COMPARISON: None. HISTORY: ORDERING SYSTEM PROVIDED HISTORY: nausea, vomiting TECHNOLOGIST PROVIDED HISTORY: Reason for exam:->nausea, vomiting Additional Contrast?->None Reason for Exam: midline abd pain FINDINGS: Lower Chest: Nodular opacities are seen within the right middle lobe. Findings could be related to an atypical pneumonia. Organs: There has been a cholecystectomy. The liver, spleen, adrenal glands, pancreas, and kidneys are unremarkable. GI/Bowel: The small bowel loops are within normal limits in caliber. The appendix is normal.  Mild colonic diverticulosis is seen. There is a focal area of abrupt narrowing involving the splenic flexure/distal transverse colon with adjacent pericolonic inflammatory changes. This results in large bowel obstruction with the transverse colon measuring 6.7 cm. This could represent focal area of stricture, acute infection, however neoplasm cannot be entirely excluded. No pericolonic lymph nodes are identified. This focal area of narrowing measures approximately 8 cm in length. Pelvis: The bladder is unremarkable. There is no free fluid. Peritoneum/Retroperitoneum: There is no free air or lymphadenopathy. Bones/Soft Tissues: No destructive osseous lesions are identified.      1. Focal area of abrupt narrowing involving the splenic flexure/distal transverse colon with mucosal thickening and pericolonic stranding measuring approximately 8 cm in length resulting in large bowel obstruction. The transverse colon proximally measures 6.7 cm. This focal area could represent a stricture, an area of acute on chronic colitis/diverticulitis, however neoplasm cannot be entirely excluded. Further evaluation with colonoscopy with surgical evaluation is recommended. 2. No evidence of free air. 3. Focal nodular opacities are noted within the right middle lobe of uncertain etiology. This should be further evaluated with CT chest.     Nm Myocardial Spect Rest Exercise Or Rx    Result Date: 8/7/2020  Cardiac Perfusion Imaging   Demographics   Patient Name      Soraida Richey        Date of study        08/07/2020   Date of Birth     1960         Gender               Female   Age               62 year(s)         Race                    Patient Number    [id number]         Room Number          3719   Visit Number      [id number]          Height               62 inches   Corporate ID      [id number]           Weight               151 pounds   Accession Number  [id number]                                        NM Technologist      Ashley Rich MD        Cardiologist         Bimal HAYES   Conclusions   Summary  Normal Stress nuclear scintigraphic study suggestive of normal myocardial  perfusion. Gated images demonstrate normal left ventricular systolic  function with EF of 60 %. Signatures   ------------------------------------------------------------------  Electronically signed by Harley Morfin MD  (Interpreting cardiologist) on 08/07/2020 at 14:34  ------------------------------------------------------------------  Procedure Procedure Type:   Nuclear Stress Test:Pharmacological, Myocardial Perfusion Imaging with  Pharm, NM MYOCARDIAL SPECT REST EXERCISE OR RX  Indications: Chest pain.   Risk Factors   The patient risk

## 2020-08-24 NOTE — CARE COORDINATION
Received call from [address] [place] [address] Outpatient pharmacy for help with discharge meds. Patient is currently on our flagged list due to receiving help in May. At that time she was not a [place] resident. If that has changed she will need to do a complete application, provide proof of residency and all household income. She can contact our office to request application should she want assistance.

## 2020-08-24 NOTE — PROGRESS NOTES
General Surgery-Dr. Nury Guerra Day: 14    Chief Complaint on Admission: large bowel obstruction      Subjective:     -patient doing well overall. Wants to go home.  -denies n/v  -pain well controlled  - + bowel movement  -wants drain out. ROS:  Review of Systems   Constitutional: Negative. HENT: Negative. Eyes: Negative. Respiratory: Negative. Cardiovascular: Negative. Gastrointestinal: Negative. RUDOLPH drain in place   Endocrine: Negative. Genitourinary: Negative. Musculoskeletal: Negative. Skin: Negative. Allergic/Immunologic: Negative. Neurological: Negative. Hematological: Negative. Psychiatric/Behavioral: Negative. Allergies  Patient has no known allergies. Diagnosis Date    Amphetamine abuse ([facility]     Cocaine abuse ([facility]     NSTEMI (non-ST elevated myocardial infarction) ([facility] 2020       Objective:     Vitals:    20 0413   BP: 137/84   Pulse: 66   Resp: 12   Temp: 98.5 °F (36.9 °C)   SpO2: 97%       TEMPERATURE:  Current -Temp: 98.5 °F (36.9 °C); Max - Temp  Av.1 °F (36.7 °C)  Min: 97.6 °F (36.4 °C)  Max: 98.5 °F (36.9 °C)    No intake/output data recorded. I/O last 3 completed shifts: In: 900 [P.O.:900]  Out: 160 [Drains:70; Other:90]      Physical Exam:  Physical Exam  Vitals signs and nursing note reviewed. Constitutional:       Appearance: Normal appearance. HENT:      Head: Normocephalic and atraumatic. Right Ear: External ear normal.      Left Ear: External ear normal.   Eyes:      Pupils: Pupils are equal, round, and reactive to light. Neck:      Musculoskeletal: Normal range of motion. Cardiovascular:      Rate and Rhythm: Normal rate and regular rhythm. Pulmonary:      Effort: Pulmonary effort is normal.      Breath sounds: Normal breath sounds. Abdominal:      General: Abdomen is flat. Bowel sounds are normal.      Tenderness: There is abdominal tenderness.       Comments: Midline incision with staples. Skin:     General: Skin is warm and dry. Comments: Midline incision with staples. Dressing intact. RUDOLPH drain in place   Neurological:      General: No focal deficit present. Mental Status: She is alert and oriented to person, place, and time. Psychiatric:         Mood and Affect: Mood normal.         Behavior: Behavior normal.           Scheduled Meds:   metoclopramide  10 mg Oral TID AC    clopidogrel  75 mg Oral Daily    aspirin  81 mg Oral Daily    carvedilol  25 mg Oral BID WC    amLODIPine  10 mg Oral Daily    nicotine  1 patch Transdermal Daily    sodium chloride flush  10 mL Intravenous 2 times per day    escitalopram  20 mg Oral Daily    levothyroxine  100 mcg Oral Daily    budesonide-formoterol  2 puff Inhalation BID    gabapentin  800 mg Oral TID    pantoprazole  40 mg Intravenous Daily    sodium chloride flush  10 mL Intravenous BID     ContinuousInfusions:  PRN Meds:oxyCODONE **OR** oxyCODONE, LORazepam, promethazine, hydrALAZINE (APRESOLINE) ivpb, sodium chloride flush, acetaminophen **OR** acetaminophen, magnesium hydroxide, promethazine **OR** [DISCONTINUED] ondansetron      Labs/Imaging Results:   Lab Results   Component Value Date    WBC 9.2 08/24/2020    HGB 11.8 (L) 08/24/2020    HCT 37.9 08/24/2020    MCV 94.0 08/24/2020     08/24/2020     Lab Results   Component Value Date     08/20/2020    K 4.6 08/20/2020     08/20/2020    CO2 33 (H) 08/20/2020    BUN 6 08/20/2020    CREATININE 0.7 08/20/2020    GLUCOSE 89 08/20/2020    CALCIUM 8.6 08/20/2020    PROT 6.2 (L) 08/12/2020    LABALBU 2.8 (L) 08/12/2020    BILITOT 0.2 08/12/2020    ALKPHOS 95 08/12/2020    AST 40 (H) 08/12/2020    ALT 30 08/12/2020    LABGLOM >60 08/20/2020    GFRAA >60 08/20/2020       Assessment:     Collette Stank os a 63 yo female s/p extended left colectomy for benign stricture. Doing well.       Plan:     -Okay to D/C RUDOLPH drain  -Okay to D/C from surgery standpoint  -follow-up

## 2020-08-25 LAB
EKG ATRIAL RATE: 57 BPM
EKG DIAGNOSIS: NORMAL
EKG P AXIS: 32 DEGREES
EKG P-R INTERVAL: 188 MS
EKG Q-T INTERVAL: 438 MS
EKG QRS DURATION: 90 MS
EKG QTC CALCULATION (BAZETT): 426 MS
EKG R AXIS: 57 DEGREES
EKG T AXIS: 69 DEGREES
EKG VENTRICULAR RATE: 57 BPM

## 2020-08-27 ENCOUNTER — APPOINTMENT (OUTPATIENT)
Dept: CT IMAGING | Age: 60
End: 2020-08-27
Payer: MEDICARE

## 2020-08-27 ENCOUNTER — HOSPITAL ENCOUNTER (EMERGENCY)
Age: 60
Discharge: HOME | End: 2020-08-27
Payer: MEDICARE

## 2020-08-27 VITALS
RESPIRATION RATE: 16 BRPM | TEMPERATURE: 98.7 F | DIASTOLIC BLOOD PRESSURE: 81 MMHG | HEART RATE: 71 BPM | SYSTOLIC BLOOD PRESSURE: 144 MMHG | OXYGEN SATURATION: 94 %

## 2020-08-27 LAB
ALBUMIN SERPL-MCNC: 3.6 GM/DL (ref 3.4–5)
ALP BLD-CCNC: 399 IU/L (ref 40–128)
ALT SERPL-CCNC: 24 U/L (ref 10–40)
ANION GAP SERPL CALCULATED.3IONS-SCNC: 10 MMOL/L (ref 4–16)
AST SERPL-CCNC: 27 IU/L (ref 15–37)
BACTERIA: NEGATIVE /HPF
BASOPHILS ABSOLUTE: 0.1 K/CU MM
BASOPHILS RELATIVE PERCENT: 0.9 % (ref 0–1)
BILIRUB SERPL-MCNC: 0.3 MG/DL (ref 0–1)
BILIRUBIN URINE: NEGATIVE MG/DL
BLOOD, URINE: NEGATIVE
BUN BLDV-MCNC: 16 MG/DL (ref 6–23)
CALCIUM SERPL-MCNC: 8.9 MG/DL (ref 8.3–10.6)
CAST TYPE: NORMAL /HPF
CHLORIDE BLD-SCNC: 95 MMOL/L (ref 99–110)
CLARITY: CLEAR
CO2: 25 MMOL/L (ref 21–32)
COLOR: YELLOW
COMMENT UA: NORMAL
CREAT SERPL-MCNC: 0.8 MG/DL (ref 0.6–1.1)
CRYSTAL TYPE: NEGATIVE /HPF
DIFFERENTIAL TYPE: ABNORMAL
EOSINOPHILS ABSOLUTE: 0.4 K/CU MM
EOSINOPHILS RELATIVE PERCENT: 4.8 % (ref 0–3)
EPITHELIAL CELLS, UA: 4 /HPF
GFR AFRICAN AMERICAN: >60 ML/MIN/1.73M2
GFR NON-AFRICAN AMERICAN: >60 ML/MIN/1.73M2
GLUCOSE BLD-MCNC: 84 MG/DL (ref 70–99)
GLUCOSE, URINE: NEGATIVE MG/DL
HCT VFR BLD CALC: 31.8 % (ref 37–47)
HEMOGLOBIN: 10.3 GM/DL (ref 12.5–16)
IMMATURE NEUTROPHIL %: 0.5 % (ref 0–0.43)
KETONES, URINE: NEGATIVE MG/DL
LEUKOCYTE ESTERASE, URINE: NEGATIVE
LYMPHOCYTES ABSOLUTE: 2.1 K/CU MM
LYMPHOCYTES RELATIVE PERCENT: 27.3 % (ref 24–44)
MCH RBC QN AUTO: 29.9 PG (ref 27–31)
MCHC RBC AUTO-ENTMCNC: 32.4 % (ref 32–36)
MCV RBC AUTO: 92.2 FL (ref 78–100)
MONOCYTES ABSOLUTE: 0.6 K/CU MM
MONOCYTES RELATIVE PERCENT: 7.6 % (ref 0–4)
NITRITE URINE, QUANTITATIVE: NEGATIVE
NUCLEATED RBC %: 0 %
PDW BLD-RTO: 13.1 % (ref 11.7–14.9)
PH, URINE: 6 (ref 5–8)
PLATELET # BLD: 418 K/CU MM (ref 140–440)
PMV BLD AUTO: 8.4 FL (ref 7.5–11.1)
POTASSIUM SERPL-SCNC: 4 MMOL/L (ref 3.5–5.1)
PROTEIN UA: NEGATIVE MG/DL
RBC # BLD: 3.45 M/CU MM (ref 4.2–5.4)
RBC URINE: NORMAL /HPF (ref 0–6)
SEGMENTED NEUTROPHILS ABSOLUTE COUNT: 4.4 K/CU MM
SEGMENTED NEUTROPHILS RELATIVE PERCENT: 58.9 % (ref 36–66)
SODIUM BLD-SCNC: 130 MMOL/L (ref 135–145)
SPECIFIC GRAVITY UA: 1 (ref 1–1.03)
TOTAL IMMATURE NEUTOROPHIL: 0.04 K/CU MM
TOTAL NUCLEATED RBC: 0 K/CU MM
TOTAL PROTEIN: 6.9 GM/DL (ref 6.4–8.2)
UROBILINOGEN, URINE: NORMAL MG/DL (ref 0.2–1)
WBC # BLD: 7.5 K/CU MM (ref 4–10.5)
WBC UA: <1 /HPF (ref 0–5)

## 2020-08-27 PROCEDURE — 96374 THER/PROPH/DIAG INJ IV PUSH: CPT

## 2020-08-27 PROCEDURE — 80053 COMPREHEN METABOLIC PANEL: CPT

## 2020-08-27 PROCEDURE — 6360000004 HC RX CONTRAST MEDICATION: Performed by: PHYSICIAN ASSISTANT

## 2020-08-27 PROCEDURE — 6370000000 HC RX 637 (ALT 250 FOR IP): Performed by: PHYSICIAN ASSISTANT

## 2020-08-27 PROCEDURE — 6360000002 HC RX W HCPCS: Performed by: PHYSICIAN ASSISTANT

## 2020-08-27 PROCEDURE — 2580000003 HC RX 258: Performed by: PHYSICIAN ASSISTANT

## 2020-08-27 PROCEDURE — 74177 CT ABD & PELVIS W/CONTRAST: CPT

## 2020-08-27 PROCEDURE — 85025 COMPLETE CBC W/AUTO DIFF WBC: CPT

## 2020-08-27 PROCEDURE — 84702 CHORIONIC GONADOTROPIN TEST: CPT

## 2020-08-27 PROCEDURE — 96361 HYDRATE IV INFUSION ADD-ON: CPT

## 2020-08-27 PROCEDURE — 81001 URINALYSIS AUTO W/SCOPE: CPT

## 2020-08-27 PROCEDURE — 99284 EMERGENCY DEPT VISIT MOD MDM: CPT

## 2020-08-27 RX ORDER — SODIUM CHLORIDE 9 MG/ML
INJECTION, SOLUTION INTRAVENOUS CONTINUOUS
Status: DISCONTINUED | OUTPATIENT
Start: 2020-08-27 | End: 2020-08-27 | Stop reason: HOSPADM

## 2020-08-27 RX ORDER — SODIUM CHLORIDE 0.9 % (FLUSH) 0.9 %
10 SYRINGE (ML) INJECTION 2 TIMES DAILY
Status: DISCONTINUED | OUTPATIENT
Start: 2020-08-27 | End: 2020-08-27 | Stop reason: HOSPADM

## 2020-08-27 RX ORDER — OXYCODONE HYDROCHLORIDE AND ACETAMINOPHEN 5; 325 MG/1; MG/1
2 TABLET ORAL ONCE
Status: COMPLETED | OUTPATIENT
Start: 2020-08-27 | End: 2020-08-27

## 2020-08-27 RX ORDER — MORPHINE SULFATE 4 MG/ML
4 INJECTION, SOLUTION INTRAMUSCULAR; INTRAVENOUS EVERY 30 MIN PRN
Status: DISCONTINUED | OUTPATIENT
Start: 2020-08-27 | End: 2020-08-27 | Stop reason: HOSPADM

## 2020-08-27 RX ADMIN — MORPHINE SULFATE 4 MG: 4 INJECTION, SOLUTION INTRAMUSCULAR; INTRAVENOUS at 04:21

## 2020-08-27 RX ADMIN — SODIUM CHLORIDE, PRESERVATIVE FREE 10 ML: 5 INJECTION INTRAVENOUS at 04:38

## 2020-08-27 RX ADMIN — OXYCODONE HYDROCHLORIDE AND ACETAMINOPHEN 2 TABLET: 5; 325 TABLET ORAL at 05:58

## 2020-08-27 RX ADMIN — SODIUM CHLORIDE: 9 INJECTION, SOLUTION INTRAVENOUS at 04:20

## 2020-08-27 RX ADMIN — IOPAMIDOL 80 ML: 755 INJECTION, SOLUTION INTRAVENOUS at 04:38

## 2020-08-27 ASSESSMENT — PAIN DESCRIPTION - LOCATION: LOCATION: ABDOMEN

## 2020-08-27 ASSESSMENT — PAIN DESCRIPTION - FREQUENCY: FREQUENCY: CONTINUOUS

## 2020-08-27 ASSESSMENT — PAIN SCALES - GENERAL
PAINLEVEL_OUTOF10: 10
PAINLEVEL_OUTOF10: 8
PAINLEVEL_OUTOF10: 10

## 2020-08-27 ASSESSMENT — PAIN DESCRIPTION - ORIENTATION: ORIENTATION: MID

## 2020-08-27 ASSESSMENT — PAIN DESCRIPTION - PAIN TYPE: TYPE: ACUTE PAIN

## 2020-08-27 ASSESSMENT — PAIN DESCRIPTION - DESCRIPTORS: DESCRIPTORS: BURNING

## 2020-08-27 NOTE — ED PROVIDER NOTES
As physician-in-triage, I performed a medical screening history and physical exam on this patient. HISTORY OF PRESENT ILLNESS  Radha Hernandez is a 62 y.o. female presents to the emergency department stating she believes she is having a reaction to the tape that they use on her postop. States she recently had a hemicolectomy. She is denying any nausea or vomiting. No fevers or chills. States her abdomen is red and tender to touch where the tape was located. Has not applied any cream or taken any Benadryl. Jun Dhaliwal PHYSICAL EXAM  /63   Pulse 89   Temp 98.7 °F (37.1 °C) (Oral)   Resp 16   SpO2 97%     On exam, the patient appears in no acute distress. Speech is clear. Breathing is unlabored. Moves all extremities    Comment: Please note this report has been produced using speech recognition software and may contain errors related to that system including errors in grammar, punctuation, and spelling, as well as words and phrases that may be inappropriate. If there are any questions or concerns please feel free to contact the dictating provider for clarification.        Simeon Naranjo MD  08/27/20 3607

## 2020-08-27 NOTE — ED PROVIDER NOTES
Triage Chief Complaint:   Post-op Problem (reports abdominal incision irriatated from tape and dressing)    [place]:  Today in the ED I had the pleasure of caring for Nhaid Killian who is a 62 y.o. female that presents today complaining of abdominal skin irritation. Pt is 9 days Post  Open extended left hemicolectomy with primary anastomosis. Performed by Dr. Luis Pike and states that she has been eating drinking baseline, eliminating well including BM. She states the tape that was securing her dressings onto her abdomen has been irritating her skin and causing her pain and itching, she states \"when I take the tape off, my skin comes off with it\". She denies f/c/n/v/d/cp/sob. She endorses some abdominal pain states she takes percocet for her pain. ROS:  REVIEW OF SYSTEMS    At least 10 systems reviewed      All other review of systems are negative  See HPI and nursing notes for additional information       Past Medical History:   Diagnosis Date    Amphetamine abuse ([address] 2020    Cocaine abuse ([address] 2020    NSTEMI (non-ST elevated myocardial infarction) ([facility] 04/2020     Past Surgical History:   Procedure Laterality Date    HEMICOLECTOMY Left 8/18/2020    #1 EXCISION OF LEFT HEMICOLOECTOMY WITH #2MOBILIZATION OF [place]uss #3 SIDE BY SIDE ANASTOMOSIS #4RIGID PROCTOLOGY performed by Rona Brooks MD at [address] [place] [address] 8/13/2020    SIGMOIDOSCOPY BIOPSY FLEXIBLE performed by Brit May MD at [facility] ENDOSCOPY     No family history on file. Social History     Socioeconomic History    Marital status:       Spouse name: Not on file    Number of children: Not on file    Years of education: Not on file    Highest education level: Not on file   Occupational History    Not on file   Social Needs    Financial resource strain: Not on file    Food insecurity     Worry: Not on file     Inability: Not on file    Transportation needs     Medical: Not on file     Non-medical: Not on file Tobacco Use    Smoking status: Current Every Day Smoker    Smokeless tobacco: Never Used   Substance and Sexual Activity    Alcohol use: Never     Frequency: Never    Drug use: Yes     Types: Cocaine, Marijuana, Opiates , Methamphetamines     Comment: 2020 based on UDS    Sexual activity: Not on file   Lifestyle    Physical activity     Days per week: Not on file     Minutes per session: Not on file    Stress: Not on file   Relationships    Social connections     Talks on phone: Not on file     Gets together: Not on file     Attends Gnosticism service: Not on file     Active member of club or organization: Not on file     Attends meetings of clubs or organizations: Not on file     Relationship status: Not on file    Intimate partner violence     Fear of current or ex partner: Not on file     Emotionally abused: Not on file     Physically abused: Not on file     Forced sexual activity: Not on file   Other Topics Concern    Not on file   Social History Narrative    Not on file     Current Facility-Administered Medications   Medication Dose Route Frequency Provider Last Rate Last Dose    sodium chloride flush 0.9 % injection 10 mL  10 mL Intravenous BID Elgie Formosa, PA-C   10 mL at 08/27/20 0438    morphine sulfate (PF) injection 4 mg  4 mg Intravenous Q30 Min PRN Elgie Formosa, PA-C   4 mg at 08/27/20 0421    0.9 % sodium chloride infusion   Intravenous Continuous Elgie Formosa, PA-C 100 mL/hr at 08/27/20 4758      oxyCODONE-acetaminophen (PERCOCET) 5-325 MG per tablet 2 tablet  2 tablet Oral Once Elgie Formosa, PA-C         Current Outpatient Medications   Medication Sig Dispense Refill    LORazepam (ATIVAN) 0.5 MG tablet Take 1 tablet by mouth every 8 hours as needed for Anxiety for up to 3 days.  9 tablet 0    carvedilol (COREG) 25 MG tablet Take 1 tablet by mouth 2 times daily (with meals) 60 tablet 1    amLODIPine (NORVASC) 10 MG tablet Take 1 tablet by mouth daily 30 tablet 1    docusate sodium (COLACE) 100 MG capsule Take 1 capsule by mouth 2 times daily 60 capsule 0    ondansetron (ZOFRAN) 4 MG tablet Take 1 tablet by mouth 3 times daily as needed for Nausea or Vomiting 30 tablet 0    oxyCODONE-acetaminophen (PERCOCET) 5-325 MG per tablet Take 1 tablet by mouth every 6 hours as needed for Pain for up to 5 days. Intended supply: 5 days. Take lowest dose possible to manage pain 25 tablet 0    traZODone (DESYREL) 50 MG tablet Take 50 mg by mouth nightly      mometasone-formoterol (DULERA) 100-5 MCG/ACT inhaler Inhale 2 puffs into the lungs 2 times daily      levothyroxine (SYNTHROID) 100 MCG tablet Take 100 mcg by mouth Daily      gabapentin (NEURONTIN) 800 MG tablet Take 800 mg by mouth 3 times daily.  promethazine (PHENERGAN) 25 MG tablet Take 25 mg by mouth every 6 hours as needed for Nausea      albuterol sulfate HFA (VENTOLIN HFA) 108 (90 Base) MCG/ACT inhaler Inhale 1 puff into the lungs every 6 hours as needed for Wheezing      escitalopram (LEXAPRO) 20 MG tablet Take 20 mg by mouth daily      atorvastatin (LIPITOR) 80 MG tablet Take 80 mg by mouth nightly      aspirin 81 MG chewable tablet Take 81 mg by mouth daily      traMADol (ULTRAM) 50 MG tablet Take 50 mg by mouth every 6 hours as needed for Pain.  clopidogrel (PLAVIX) 75 MG tablet Take 75 mg by mouth daily      fluticasone-salmeterol (ADVAIR) 250-50 MCG/DOSE AEPB Inhale 1 puff into the lungs 2 times daily       No Known Allergies    Nursing Notes Reviewed    Physical Exam:  ED Triage Vitals [08/27/20 0011]   Enc Vitals Group      /63      Pulse 89      Resp 16      Temp 98.7 °F (37.1 °C)      Temp Source Oral      SpO2 97 %      Weight       Height       Head Circumference       Peak Flow       Pain Score       Pain Loc       Pain Edu? Excl. in [address]?       General :Patient is awake alert oriented person place and time no acute distress nontoxic appearing  HEENT: Pupils are equally round and reactive to light extraocular motors are intact conjunctivae clear sclerae white there is no injection no icterus. Nose without any rhinorrhea or epistaxis. Oral mucosa is moist no exudate buccal mucosa shows no ulcerations. Uvula is midline    Neck: Neck is supple full range of motion trachea midline thyroid nonpalpable  Cardiac: Heart regular rate rhythm no murmurs rubs clicks or gallops  Lungs: Lungs are clear to auscultation there is no wheezing rhonchi or rales. There is no use of accessory muscles no nasal flaring identified. Chest wall: There is no tenderness to palpation over the chest wall or over ribs  Abdomen: Abdomen is softnondistended. There is no firm or pulsatile masses no rebound rigidity or guarding negative Colmenares's negative McBurney, no peritoneal signs. Large vertical incisional scar was staples intact. No dehiscence. No discharge. No erythema. Clean dry and intact    Abdomen is diffusely mildly tender as can be expected 1.5 weeks postop. There is patchy areas of what appear to be redness and irritation without heat.     Suprapubic:  there is no tenderness to palpation over the external bladder         I have reviewed and interpreted all of the currently available lab results from this visit (if applicable):  Results for orders placed or performed during the hospital encounter of 08/27/20   CBC with Auto Diff   Result Value Ref Range    WBC 7.5 4.0 - 10.5 K/CU MM    RBC 3.45 (L) 4.2 - 5.4 M/CU MM    Hemoglobin 10.3 (L) 12.5 - 16.0 GM/DL    Hematocrit 31.8 (L) 37 - 47 %    MCV 92.2 78 - 100 FL    MCH 29.9 27 - 31 PG    MCHC 32.4 32.0 - 36.0 %    RDW 13.1 11.7 - 14.9 %    Platelets 576 227 - 738 K/CU MM    MPV 8.4 7.5 - 11.1 FL    Differential Type AUTOMATED DIFFERENTIAL     Segs Relative 58.9 36 - 66 %    Lymphocytes % 27.3 24 - 44 %    Monocytes % 7.6 (H) 0 - 4 %    Eosinophils % 4.8 (H) 0 - 3 %    Basophils % 0.9 0 - 1 %    Segs Absolute 4.4 K/CU MM    Lymphocytes Absolute 2.1 K/CU MM    Monocytes Absolute 0.6 K/CU MM    Eosinophils Absolute 0.4 K/CU MM    Basophils Absolute 0.1 K/CU MM    Nucleated RBC % 0.0 %    Total Nucleated RBC 0.0 K/CU MM    Total Immature Neutrophil 0.04 K/CU MM    Immature Neutrophil % 0.5 (H) 0 - 0.43 %   CMP   Result Value Ref Range    Sodium 130 (L) 135 - 145 MMOL/L    Potassium 4.0 3.5 - 5.1 MMOL/L    Chloride 95 (L) 99 - 110 mMol/L    CO2 25 21 - 32 MMOL/L    BUN 16 6 - 23 MG/DL    CREATININE 0.8 0.6 - 1.1 MG/DL    Glucose 84 70 - 99 MG/DL    Calcium 8.9 8.3 - 10.6 MG/DL    Alb 3.6 3.4 - 5.0 GM/DL    Total Protein 6.9 6.4 - 8.2 GM/DL    Total Bilirubin 0.3 0.0 - 1.0 MG/DL    ALT 24 10 - 40 U/L    AST 27 15 - 37 IU/L    Alkaline Phosphatase 399 (H) 40 - 128 IU/L    GFR Non-African American >60 >60 mL/min/1.73m2    GFR African American >60 >60 mL/min/1.73m2    Anion Gap 10 4 - 16      Radiographs (if obtained):  [] The following radiograph was interpreted by myself in the absence of a radiologist:   [] Radiologist's Report Reviewed:  CT ABDOMEN PELVIS W IV CONTRAST   Preliminary Result   Status post partial left hemicolectomy with colonic anastomosis in the lower   abdomen on the left. No bowel obstruction is seen. Small amount of air in the bladder likely related to recent instrumentation. EKG (if obtained):   Please See Note of attending physician for EKG interpretation. Chart review shows recent radiograph(s):  Xr Chest (2 Vw)    Result Date: 8/5/2020  EXAMINATION: TWO XRAY VIEWS OF THE CHEST 8/4/2020 9:44 pm COMPARISON: None HISTORY: ORDERING SYSTEM PROVIDED HISTORY: shortness of breath TECHNOLOGIST PROVIDED HISTORY: Reason for exam:->shortness of breath Reason for Exam: shortness of breath Acuity: Acute Type of Exam: Initial Acute shortness of breath. Initial encounter. FINDINGS: The cardiomediastinal silhouette is within normal range. Lungs are clear.  There is no focal pulmonary consolidation, pleural effusion, pneumothorax, or evidence of airspace pulmonary edema. Patient has had a cholecystectomy and lower cervical fusion. No radiographic finding to account for patient's shortness of breath. Ct Chest Wo Contrast    Result Date: 8/12/2020  EXAMINATION: CT OF THE CHEST WITHOUT CONTRAST 8/12/2020 7:09 pm TECHNIQUE: CT of the chest was performed without the administration of intravenous contrast. Multiplanar reformatted images are provided for review. Dose modulation, iterative reconstruction, and/or weight based adjustment of the mA/kV was utilized to reduce the radiation dose to as low as reasonably achievable. COMPARISON: Chest radiograph 08/04/2020. CT abdomen and pelvis 08/11/2020. HISTORY: ORDERING SYSTEM PROVIDED HISTORY: Findings of infiltrated on CT abdomen TECHNOLOGIST PROVIDED HISTORY: Reason for exam:->Findings of infiltrated on CT abdomen Reason for Exam: Findings of infiltrated on CT abdomen Acuity: Unknown Type of Exam: Unknown FINDINGS: Mediastinum: Lack of intravenous contrast limits evaluation of the mediastinum. The thoracic aorta is normal in caliber with mild calcific plaquing. Dense coronary artery atherosclerotic vascular calcifications are seen. The main pulmonary artery is normal in caliber. The heart is normal in size. No pericardial effusion. The mediastinal esophagus and thyroid gland are unremarkable. No lymphadenopathy. Lungs/pleura: The central airways are patent. No pleural effusion or pneumothorax. No consolidation or interlobular septal thickening. Mild bibasilar atelectasis. Focal area of irregular linear opacities in the periphery of the right middle lobe without significant change. Upper Abdomen: Segment of circumferentially narrowed distal transverse colon and splenic flexure with wall thickening and adjacent stranding unchanged from the previous exam with upstream colonic dilatation. Soft Tissues/Bones: No acute osseous or soft tissue abnormality     1.  Small focal area of irregular linear opacities in the right middle lobe of uncertain etiology. This may represent scarring, atelectasis, or post infectious changes. 2. Dense coronary artery atherosclerotic vascular calcifications. 3. Stable appearance of a segment of circumferentially narrowed and thickened distal transverse colon and splenic flexure compared with the CT abdomen and pelvis on 08/11/2020. Ct Abdomen Pelvis W Iv Contrast Additional Contrast? None    Result Date: 8/12/2020  EXAMINATION: CT OF THE ABDOMEN AND PELVIS WITH CONTRAST 8/11/2020 10:17 pm TECHNIQUE: CT of the abdomen and pelvis was performed with the administration of intravenous contrast. Multiplanar reformatted images are provided for review. Dose modulation, iterative reconstruction, and/or weight based adjustment of the mA/kV was utilized to reduce the radiation dose to as low as reasonably achievable. COMPARISON: None. HISTORY: ORDERING SYSTEM PROVIDED HISTORY: nausea, vomiting TECHNOLOGIST PROVIDED HISTORY: Reason for exam:->nausea, vomiting Additional Contrast?->None Reason for Exam: midline abd pain FINDINGS: Lower Chest: Nodular opacities are seen within the right middle lobe. Findings could be related to an atypical pneumonia. Organs: There has been a cholecystectomy. The liver, spleen, adrenal glands, pancreas, and kidneys are unremarkable. GI/Bowel: The small bowel loops are within normal limits in caliber. The appendix is normal.  Mild colonic diverticulosis is seen. There is a focal area of abrupt narrowing involving the splenic flexure/distal transverse colon with adjacent pericolonic inflammatory changes. This results in large bowel obstruction with the transverse colon measuring 6.7 cm. This could represent focal area of stricture, acute infection, however neoplasm cannot be entirely excluded. No pericolonic lymph nodes are identified. This focal area of narrowing measures approximately 8 cm in length. Pelvis: The bladder is unremarkable.   There is no free fluid. Peritoneum/Retroperitoneum: There is no free air or lymphadenopathy. Bones/Soft Tissues: No destructive osseous lesions are identified. 1. Focal area of abrupt narrowing involving the splenic flexure/distal transverse colon with mucosal thickening and pericolonic stranding measuring approximately 8 cm in length resulting in large bowel obstruction. The transverse colon proximally measures 6.7 cm. This focal area could represent a stricture, an area of acute on chronic colitis/diverticulitis, however neoplasm cannot be entirely excluded. Further evaluation with colonoscopy with surgical evaluation is recommended. 2. No evidence of free air. 3. Focal nodular opacities are noted within the right middle lobe of uncertain etiology. This should be further evaluated with CT chest.     Nm Myocardial Spect Rest Exercise Or Rx    Result Date: 8/7/2020  Cardiac Perfusion Imaging   Demographics   Patient Name      Pleasant Speller        Date of study        08/07/2020   Date of Birth     1960         Gender               Female   Age               62 year(s)         Race                    Patient Number    [id number]         Room Number          4026   Visit Number      [id number]          Height               62 inches   Corporate ID      [id number]           Weight               151 pounds   Accession Number  [id number]                                        NM Technologist      Dianna Peres MD        Cardiologist         Bimal HAYES   Conclusions   Summary  Normal Stress nuclear scintigraphic study suggestive of normal myocardial  perfusion. Gated images demonstrate normal left ventricular systolic  function with EF of 60 %.    Signatures   ------------------------------------------------------------------  Electronically signed by Cristofer Valladares MD  (Interpreting follows with 5mL 0.9% Normal Saline flush. Immediately following  the Nuclear Technologist intravenously injects 22-33mCi of 99mTc Sestamibi  and 5mL 0.9% Normal Saline flush. After completion, recovery, and removal of  the IV, the patient rests during the second circulation period of 45  minutes. Final stress SPECT gated imaging is performed. The patient may  return home or to their room after stress imaging. The images are processed  and final charting is completed and sent to the appropriate cardiologist for  interpretation and reporting. Perfusion Interpretation   Normal tracer uptake in all segments of myocardium on stress ans rest  images. Imaging Results    Summed scores     - Summed stress score: 0     - Summed rest score: 0     - Summed difference score:    0   Rest ejection  Ejection fraction:67 %  EDV :73 ml  ESV :24 ml  Stroke volume :49 ml  Medical History   Accession#:  [id number]  Admission Data Admission date: 08/04/2020 Admission Time: 22:22 Hospital Status: Inpatient. MDM:   Presents today with irritant dermatitis secondary to tape. Abdominal exam benign on initial repeat examination. CT scan negative. Lab work unremarkable vital signs stable here in the ED. Patient is educated on topical creams, preventing irritant exposures. As well as follow-up with her PCP as well as routine follow-up with surgeon as already scheduled     I independently managed patient today in the ED. /66   Pulse 68   Temp 98.7 °F (37.1 °C) (Oral)   Resp 17   SpO2 95%       Clinical Impression:  1.  Irritant dermatitis        Disposition referral (if applicable):  [facility]  [address] [id number]  [phone]    If symptoms worsen or persist    Disposition medications (if applicable):  New Prescriptions    No medications on file         Comment: Please note this report has been produced using speech recognition software and may contain errors related to that system including errors in grammar, punctuation, and spelling, as well as words and phrases that may be inappropriate. If there are any questions or concerns please feel free to contact the dictating provider for clarification.       Jones Serrano, [address] [place] [address]  08/27/20 5522

## 2020-08-27 NOTE — ED NOTES
Report given to Barber Oliver RN to assume care of pt at this time      Francisco Kohli RN  08/27/20 0596

## 2020-08-27 NOTE — ED NOTES
Pt presents to ED for abdominal pain post op. Pt had a bowel blockage and had the left side of the colon removed. Pt states the adhesive tape from her dressings has caused burning on her skin. Pt's abdomen is red and hot to the touch with skin breakdown where tape was applied. Incision looks clean and intact minus the umbilical area which pt states was left that way after surgery.       Anju Hartley RN  08/27/20 1265

## 2020-09-01 ENCOUNTER — OFFICE VISIT (OUTPATIENT)
Dept: BARIATRICS/WEIGHT MGMT | Age: 60
End: 2020-09-01

## 2020-09-01 VITALS
HEIGHT: 62 IN | SYSTOLIC BLOOD PRESSURE: 122 MMHG | HEART RATE: 80 BPM | TEMPERATURE: 97.8 F | BODY MASS INDEX: 27.31 KG/M2 | RESPIRATION RATE: 16 BRPM | OXYGEN SATURATION: 99 % | DIASTOLIC BLOOD PRESSURE: 70 MMHG | WEIGHT: 148.4 LBS

## 2020-09-01 PROCEDURE — 99024 POSTOP FOLLOW-UP VISIT: CPT | Performed by: SURGERY

## 2020-09-01 RX ORDER — OXYCODONE HYDROCHLORIDE AND ACETAMINOPHEN 5; 325 MG/1; MG/1
1 TABLET ORAL EVERY 6 HOURS PRN
Qty: 12 TABLET | Refills: 0 | Status: SHIPPED | OUTPATIENT
Start: 2020-09-01 | End: 2020-09-04

## 2020-09-01 NOTE — PROGRESS NOTES
Post-Operative Clinic Note    Chief Complaint   Patient presents with    Post-Op Check     Exp lap L hemicolectomy- mobilization of slenic flex side by side anastinosis- rigid proctoscopy @ [facility] 08/18/20         SUBJECTIVE:  Patient is here today for a post-operative visit. Patient is s/p extended left hemicolectomy with primary anastomosis on 8/18/2020 for obstructing stricture. Tolerating PO. +regular BM. Some liquid more than other. Some incisional pain but patient has chronic pain at baseline. Pt reports that she fell / tripped on rug several days ago 2/2 loose shoes. Denies F/C. Past Surgical History:   Procedure Laterality Date    HEMICOLECTOMY Left 8/18/2020    #1 EXCISION OF LEFT HEMICOLOECTOMY WITH #2MOBILIZATION OF [place] #3 SIDE BY SIDE ANASTOMOSIS #4RIGID PROCTOLOGY performed by Nan Lee MD at [address] [place] [address] Road N/A 8/13/2020    SIGMOIDOSCOPY BIOPSY FLEXIBLE performed by Dahlia Steiner MD at [facility] ENDOSCOPY     Past Medical History:   Diagnosis Date    Amphetamine abuse ([facility] 2020    Cocaine abuse ([facility] 2020    NSTEMI (non-ST elevated myocardial infarction) ([facility] 04/2020     No family history on file. Social History     Socioeconomic History    Marital status:       Spouse name: Not on file    Number of children: Not on file    Years of education: Not on file    Highest education level: Not on file   Occupational History    Not on file   Social Needs    Financial resource strain: Not on file    Food insecurity     Worry: Not on file     Inability: Not on file    Transportation needs     Medical: Not on file     Non-medical: Not on file   Tobacco Use    Smoking status: Current Every Day Smoker    Smokeless tobacco: Never Used   Substance and Sexual Activity    Alcohol use: Never     Frequency: Never    Drug use: Yes     Types: Cocaine, Marijuana, Opiates , Methamphetamines     Comment: 2020 based on UDS    Sexual activity: Not on file   Lifestyle    Physical activity     Days per week: Not on file     Minutes per session: Not on file    Stress: Not on file   Relationships    Social connections     Talks on phone: Not on file     Gets together: Not on file     Attends Yazidi service: Not on file     Active member of club or organization: Not on file     Attends meetings of clubs or organizations: Not on file     Relationship status: Not on file    Intimate partner violence     Fear of current or ex partner: Not on file     Emotionally abused: Not on file     Physically abused: Not on file     Forced sexual activity: Not on file   Other Topics Concern    Not on file   Social History Narrative    Not on file       OBJECTIVE:  Physical Exam  Vitals signs reviewed. Constitutional:       General: She is not in acute distress. Appearance: She is not ill-appearing, toxic-appearing or diaphoretic. HENT:      Head: Normocephalic and atraumatic. Right Ear: External ear normal.      Left Ear: External ear normal.   Eyes:      General:         Right eye: No discharge. Left eye: No discharge. Extraocular Movements: Extraocular movements intact. Cardiovascular:      Rate and Rhythm: Normal rate. Pulmonary:      Effort: Pulmonary effort is normal.   Abdominal:      Palpations: Abdomen is soft. Comments: Incisions healing nicely, 2-3 mm opening of skin near umbilicus healing by secondary intention, + staples in place. Min and approp TTP, no PS. Soft, ND. Musculoskeletal:         General: No swelling. Skin:     General: Skin is warm. Neurological:      General: No focal deficit present. Mental Status: She is alert.    Psychiatric:         Mood and Affect: Mood normal.           Pathology report reveals:     Final Pathologic Diagnosis:   Colon, left, segmental resection:        -     Segment of colon with a stricture showing mucosal   ulceration, acute and chronic inflammation, and granulation   tissue formation. -     Benign lymph nodes. -     No malignancy identified. ASSESSMENT:      1. S/P colon resection        PLAN:  1. Reviewed pathology report  2. Diet - regular  3. Activity - increase. Full at 6 weeks postop. 4. Follow up in 4 weeks. 5. Removed staples and place steri strips. 6. F/u with Dr. Terrell Vincent for stricture showing mucosal ulceration with acute and chronic inflammation. Pt was seen by him in the hospital.   7. Will refill pain medication one time. No orders of the defined types were placed in this encounter. Orders Placed This Encounter   Medications    oxyCODONE-acetaminophen (PERCOCET) 5-325 MG per tablet     Sig: Take 1 tablet by mouth every 6 hours as needed for Pain for up to 3 days. Intended supply: 3 days. Take lowest dose possible to manage pain     Dispense:  12 tablet     Refill:  0     Reduce doses taken as pain becomes manageable        Follow Up: No follow-ups on file.     Colleen Stoll MD

## 2020-11-05 ENCOUNTER — OFFICE VISIT (OUTPATIENT)
Dept: BARIATRICS/WEIGHT MGMT | Age: 60
End: 2020-11-05
Payer: MEDICARE

## 2020-11-05 VITALS
DIASTOLIC BLOOD PRESSURE: 80 MMHG | HEART RATE: 78 BPM | WEIGHT: 144 LBS | BODY MASS INDEX: 26.34 KG/M2 | TEMPERATURE: 98.7 F | SYSTOLIC BLOOD PRESSURE: 130 MMHG | RESPIRATION RATE: 18 BRPM

## 2020-11-05 PROCEDURE — G8419 CALC BMI OUT NRM PARAM NOF/U: HCPCS | Performed by: SURGERY

## 2020-11-05 PROCEDURE — 99213 OFFICE O/P EST LOW 20 MIN: CPT | Performed by: SURGERY

## 2020-11-05 PROCEDURE — G8427 DOCREV CUR MEDS BY ELIG CLIN: HCPCS | Performed by: SURGERY

## 2020-11-05 PROCEDURE — G8484 FLU IMMUNIZE NO ADMIN: HCPCS | Performed by: SURGERY

## 2020-11-05 PROCEDURE — 3017F COLORECTAL CA SCREEN DOC REV: CPT | Performed by: SURGERY

## 2020-11-05 PROCEDURE — 4004F PT TOBACCO SCREEN RCVD TLK: CPT | Performed by: SURGERY

## 2020-11-05 NOTE — PROGRESS NOTES
Post-Operative Clinic Note    Chief Complaint   Patient presents with    Post-Op Check     2nd P/O Exploratory laparotomy, Extended left hemicolectomy, Mobilization of          SUBJECTIVE:  Patient is here today for a post-operative visit. Patient is s/p extended left hemicolectomy with primary anastomosis on 8/18/2020 for obstructing stricture. Overall doing well. Some constipation. Never followed up with Dr. Sepideh Montes. States she only drinks coffee. No water. No other liquids. BM about once / week. Does not eat fiber. States she has a hemorrhoid she wants removed. Past Surgical History:   Procedure Laterality Date    HEMICOLECTOMY Left 8/18/2020    #1 EXCISION OF LEFT HEMICOLOECTOMY WITH #2MOBILIZATION OF Jennifer Ulisses #3 SIDE BY SIDE ANASTOMOSIS #4RIGID PROCTOLOGY performed by Crispin Martin MD at [address] 8/13/2020    SIGMOIDOSCOPY BIOPSY FLEXIBLE performed by Lenard Montano MD at [facility] ENDOSCOPY     Past Medical History:   Diagnosis Date    Amphetamine abuse ([facility] [address] 2020    Cocaine abuse ([facility] [address] 2020    NSTEMI (non-ST elevated myocardial infarction) ([facility] [address] 04/2020     No family history on file. Social History     Socioeconomic History    Marital status:       Spouse name: Not on file    Number of children: Not on file    Years of education: Not on file    Highest education level: Not on file   Occupational History    Not on file   Social Needs    Financial resource strain: Not on file    Food insecurity     Worry: Not on file     Inability: Not on file    Transportation needs     Medical: Not on file     Non-medical: Not on file   Tobacco Use    Smoking status: Current Every Day Smoker    Smokeless tobacco: Never Used   Substance and Sexual Activity    Alcohol use: Never     Frequency: Never    Drug use: Yes     Types: Cocaine, Marijuana, Opiates , Methamphetamines     Comment: 2020 based on UDS    Sexual activity: Not on file   Lifestyle  Physical activity     Days per week: Not on file     Minutes per session: Not on file    Stress: Not on file   Relationships    Social connections     Talks on phone: Not on file     Gets together: Not on file     Attends Restoration service: Not on file     Active member of club or organization: Not on file     Attends meetings of clubs or organizations: Not on file     Relationship status: Not on file    Intimate partner violence     Fear of current or ex partner: Not on file     Emotionally abused: Not on file     Physically abused: Not on file     Forced sexual activity: Not on file   Other Topics Concern    Not on file   Social History Narrative    Not on file       OBJECTIVE:  Physical Exam  A&Ox3, NAD at rest.   Somewhat erratic. Breathing unlabored. S, NT, ND, no Ps. Incision healed appropriately. SMALL. Rectal exam done with female chaperone, Denisse, Pt does have external hemorrhoids. Pathology report reveals:     Final Pathologic Diagnosis:   Colon, left, segmental resection:        -     Segment of colon with a stricture showing mucosal   ulceration, acute and chronic inflammation, and granulation   tissue formation. -     Benign lymph nodes. -     No malignancy identified. ASSESSMENT:      1. Postop check    2. Hemorrhoids, unspecified hemorrhoid type        PLAN:  1. Reviewed pathology report  2. Diet - as tolerated. 3. Activity - full. 4. Follow up after f/u with Dr. Jhon Banuelos. 5. Constipation - pt needs to increase water intake, increase fiber. To be seen by Dr. Jhon Banuelos as well. He scoped her on previous admission and segment of colon demonstrated stricture with mucosal ulceration, acute and chronic inflammation, and granulation tissue formation. 6. Will plan on hemorrhoidectomy after evaluation by GI. D/w pt that if she is still having constipation, likely to continue to have issues with hemorrhoids and if she is having constipation, her recovery from surgery will be painful.  Pt

## 2020-12-20 ENCOUNTER — HOSPITAL ENCOUNTER (EMERGENCY)
Age: 60
Discharge: HOME | End: 2020-12-20
Attending: EMERGENCY MEDICINE
Payer: MEDICARE

## 2020-12-20 ENCOUNTER — APPOINTMENT (OUTPATIENT)
Dept: CT IMAGING | Age: 60
End: 2020-12-20
Payer: MEDICARE

## 2020-12-20 VITALS
DIASTOLIC BLOOD PRESSURE: 93 MMHG | BODY MASS INDEX: 24.84 KG/M2 | WEIGHT: 135 LBS | SYSTOLIC BLOOD PRESSURE: 183 MMHG | HEART RATE: 72 BPM | RESPIRATION RATE: 16 BRPM | HEIGHT: 62 IN | TEMPERATURE: 98.8 F | OXYGEN SATURATION: 100 %

## 2020-12-20 LAB
ALBUMIN SERPL-MCNC: 3.4 GM/DL (ref 3.4–5)
ALP BLD-CCNC: 85 IU/L (ref 40–128)
ALT SERPL-CCNC: 14 U/L (ref 10–40)
AMPHETAMINES: ABNORMAL
ANION GAP SERPL CALCULATED.3IONS-SCNC: 11 MMOL/L (ref 4–16)
AST SERPL-CCNC: 22 IU/L (ref 15–37)
BACTERIA: NEGATIVE /HPF
BARBITURATE SCREEN URINE: NEGATIVE
BASOPHILS ABSOLUTE: 0 K/CU MM
BASOPHILS RELATIVE PERCENT: 0.4 % (ref 0–1)
BENZODIAZEPINE SCREEN, URINE: NEGATIVE
BILIRUB SERPL-MCNC: 0.4 MG/DL (ref 0–1)
BILIRUBIN URINE: NEGATIVE MG/DL
BLOOD, URINE: NEGATIVE
BUN BLDV-MCNC: 22 MG/DL (ref 6–23)
CALCIUM SERPL-MCNC: 8.2 MG/DL (ref 8.3–10.6)
CANNABINOID SCREEN URINE: ABNORMAL
CHLORIDE BLD-SCNC: 99 MMOL/L (ref 99–110)
CLARITY: CLEAR
CO2: 19 MMOL/L (ref 21–32)
COCAINE METABOLITE: NEGATIVE
COLOR: YELLOW
CREAT SERPL-MCNC: 0.8 MG/DL (ref 0.6–1.1)
DIFFERENTIAL TYPE: ABNORMAL
EOSINOPHILS ABSOLUTE: 0.2 K/CU MM
EOSINOPHILS RELATIVE PERCENT: 2.1 % (ref 0–3)
GFR AFRICAN AMERICAN: >60 ML/MIN/1.73M2
GFR NON-AFRICAN AMERICAN: >60 ML/MIN/1.73M2
GLUCOSE BLD-MCNC: 91 MG/DL (ref 70–99)
GLUCOSE, URINE: NEGATIVE MG/DL
HCT VFR BLD CALC: 43.7 % (ref 37–47)
HEMOGLOBIN: 14.3 GM/DL (ref 12.5–16)
HYALINE CASTS: 0 /LPF
IMMATURE NEUTROPHIL %: 0.2 % (ref 0–0.43)
INR BLD: 0.92 INDEX
KETONES, URINE: ABNORMAL MG/DL
LEUKOCYTE ESTERASE, URINE: NEGATIVE
LIPASE: 11 IU/L (ref 13–60)
LYMPHOCYTES ABSOLUTE: 1.5 K/CU MM
LYMPHOCYTES RELATIVE PERCENT: 13.3 % (ref 24–44)
MCH RBC QN AUTO: 28.9 PG (ref 27–31)
MCHC RBC AUTO-ENTMCNC: 32.7 % (ref 32–36)
MCV RBC AUTO: 88.5 FL (ref 78–100)
MONOCYTES ABSOLUTE: 0.5 K/CU MM
MONOCYTES RELATIVE PERCENT: 4.5 % (ref 0–4)
MUCUS: ABNORMAL HPF
NITRITE URINE, QUANTITATIVE: NEGATIVE
NUCLEATED RBC %: 0 %
OPIATES, URINE: ABNORMAL
OXYCODONE: NEGATIVE
PDW BLD-RTO: 15.1 % (ref 11.7–14.9)
PH, URINE: 5 (ref 5–8)
PHENCYCLIDINE, URINE: NEGATIVE
PLATELET # BLD: 310 K/CU MM (ref 140–440)
PMV BLD AUTO: 8.7 FL (ref 7.5–11.1)
POTASSIUM SERPL-SCNC: 3 MMOL/L (ref 3.5–5.1)
PROTEIN UA: NEGATIVE MG/DL
PROTHROMBIN TIME: 11.1 SECONDS (ref 11.7–14.5)
RBC # BLD: 4.94 M/CU MM (ref 4.2–5.4)
RBC URINE: ABNORMAL /HPF (ref 0–6)
SEGMENTED NEUTROPHILS ABSOLUTE COUNT: 8.8 K/CU MM
SEGMENTED NEUTROPHILS RELATIVE PERCENT: 79.5 % (ref 36–66)
SODIUM BLD-SCNC: 129 MMOL/L (ref 135–145)
SPECIFIC GRAVITY UA: 1.06 (ref 1–1.03)
SQUAMOUS EPITHELIAL: <1 /HPF
TOTAL IMMATURE NEUTOROPHIL: 0.02 K/CU MM
TOTAL NUCLEATED RBC: 0 K/CU MM
TOTAL PROTEIN: 6.6 GM/DL (ref 6.4–8.2)
TRICHOMONAS: ABNORMAL /HPF
UROBILINOGEN, URINE: NORMAL MG/DL (ref 0.2–1)
WBC # BLD: 11.1 K/CU MM (ref 4–10.5)
WBC UA: ABNORMAL /HPF (ref 0–5)

## 2020-12-20 PROCEDURE — 96375 TX/PRO/DX INJ NEW DRUG ADDON: CPT

## 2020-12-20 PROCEDURE — 2580000003 HC RX 258: Performed by: PHYSICIAN ASSISTANT

## 2020-12-20 PROCEDURE — 74177 CT ABD & PELVIS W/CONTRAST: CPT

## 2020-12-20 PROCEDURE — 85025 COMPLETE CBC W/AUTO DIFF WBC: CPT

## 2020-12-20 PROCEDURE — 80307 DRUG TEST PRSMV CHEM ANLYZR: CPT

## 2020-12-20 PROCEDURE — 96366 THER/PROPH/DIAG IV INF ADDON: CPT

## 2020-12-20 PROCEDURE — 80053 COMPREHEN METABOLIC PANEL: CPT

## 2020-12-20 PROCEDURE — 6360000004 HC RX CONTRAST MEDICATION: Performed by: EMERGENCY MEDICINE

## 2020-12-20 PROCEDURE — 83690 ASSAY OF LIPASE: CPT

## 2020-12-20 PROCEDURE — 81001 URINALYSIS AUTO W/SCOPE: CPT

## 2020-12-20 PROCEDURE — 85610 PROTHROMBIN TIME: CPT

## 2020-12-20 PROCEDURE — 6360000002 HC RX W HCPCS: Performed by: PHYSICIAN ASSISTANT

## 2020-12-20 PROCEDURE — 36415 COLL VENOUS BLD VENIPUNCTURE: CPT

## 2020-12-20 PROCEDURE — 6370000000 HC RX 637 (ALT 250 FOR IP): Performed by: PHYSICIAN ASSISTANT

## 2020-12-20 PROCEDURE — 96365 THER/PROPH/DIAG IV INF INIT: CPT

## 2020-12-20 PROCEDURE — 99285 EMERGENCY DEPT VISIT HI MDM: CPT

## 2020-12-20 RX ORDER — CIPROFLOXACIN 500 MG/1
500 TABLET, FILM COATED ORAL 2 TIMES DAILY
Qty: 14 TABLET | Refills: 0 | Status: SHIPPED | OUTPATIENT
Start: 2020-12-20 | End: 2020-12-27

## 2020-12-20 RX ORDER — ACETAMINOPHEN 500 MG
1000 TABLET ORAL 3 TIMES DAILY PRN
Qty: 180 TABLET | Refills: 0 | Status: SHIPPED | OUTPATIENT
Start: 2020-12-20

## 2020-12-20 RX ORDER — ONDANSETRON 2 MG/ML
4 INJECTION INTRAMUSCULAR; INTRAVENOUS ONCE
Status: COMPLETED | OUTPATIENT
Start: 2020-12-20 | End: 2020-12-20

## 2020-12-20 RX ORDER — POTASSIUM CHLORIDE 20 MEQ/1
10 TABLET, EXTENDED RELEASE ORAL ONCE
Status: COMPLETED | OUTPATIENT
Start: 2020-12-20 | End: 2020-12-20

## 2020-12-20 RX ORDER — DICYCLOMINE HYDROCHLORIDE 10 MG/1
10 CAPSULE ORAL 3 TIMES DAILY
Qty: 15 CAPSULE | Refills: 3 | Status: SHIPPED | OUTPATIENT
Start: 2020-12-20

## 2020-12-20 RX ORDER — METRONIDAZOLE 250 MG/1
500 TABLET ORAL ONCE
Status: COMPLETED | OUTPATIENT
Start: 2020-12-20 | End: 2020-12-20

## 2020-12-20 RX ORDER — ONDANSETRON 4 MG/1
4 TABLET, ORALLY DISINTEGRATING ORAL EVERY 8 HOURS PRN
Qty: 15 TABLET | Refills: 0 | Status: SHIPPED | OUTPATIENT
Start: 2020-12-20

## 2020-12-20 RX ORDER — SODIUM CHLORIDE 0.9 % (FLUSH) 0.9 %
10 SYRINGE (ML) INJECTION 2 TIMES DAILY
Status: DISCONTINUED | OUTPATIENT
Start: 2020-12-20 | End: 2020-12-21 | Stop reason: HOSPADM

## 2020-12-20 RX ORDER — MORPHINE SULFATE 4 MG/ML
4 INJECTION, SOLUTION INTRAMUSCULAR; INTRAVENOUS ONCE
Status: COMPLETED | OUTPATIENT
Start: 2020-12-20 | End: 2020-12-20

## 2020-12-20 RX ORDER — POTASSIUM CHLORIDE 20 MEQ/1
30 TABLET, EXTENDED RELEASE ORAL ONCE
Status: COMPLETED | OUTPATIENT
Start: 2020-12-20 | End: 2020-12-20

## 2020-12-20 RX ORDER — CIPROFLOXACIN 500 MG/1
500 TABLET, FILM COATED ORAL ONCE
Status: COMPLETED | OUTPATIENT
Start: 2020-12-20 | End: 2020-12-20

## 2020-12-20 RX ORDER — METRONIDAZOLE 500 MG/1
500 TABLET ORAL 2 TIMES DAILY
Qty: 14 TABLET | Refills: 0 | Status: SHIPPED | OUTPATIENT
Start: 2020-12-20 | End: 2020-12-27

## 2020-12-20 RX ORDER — POTASSIUM CHLORIDE 7.45 MG/ML
10 INJECTION INTRAVENOUS PRN
Status: DISCONTINUED | OUTPATIENT
Start: 2020-12-20 | End: 2020-12-21 | Stop reason: HOSPADM

## 2020-12-20 RX ORDER — 0.9 % SODIUM CHLORIDE 0.9 %
1000 INTRAVENOUS SOLUTION INTRAVENOUS ONCE
Status: COMPLETED | OUTPATIENT
Start: 2020-12-20 | End: 2020-12-20

## 2020-12-20 RX ADMIN — METRONIDAZOLE 500 MG: 250 TABLET ORAL at 20:14

## 2020-12-20 RX ADMIN — ONDANSETRON 4 MG: 2 INJECTION INTRAMUSCULAR; INTRAVENOUS at 18:18

## 2020-12-20 RX ADMIN — IOPAMIDOL 75 ML: 755 INJECTION, SOLUTION INTRAVENOUS at 19:21

## 2020-12-20 RX ADMIN — MORPHINE SULFATE 4 MG: 4 INJECTION, SOLUTION INTRAMUSCULAR; INTRAVENOUS at 18:21

## 2020-12-20 RX ADMIN — POTASSIUM CHLORIDE 10 MEQ: 1500 TABLET, EXTENDED RELEASE ORAL at 19:52

## 2020-12-20 RX ADMIN — CIPROFLOXACIN HYDROCHLORIDE 500 MG: 500 TABLET, FILM COATED ORAL at 20:14

## 2020-12-20 RX ADMIN — SODIUM CHLORIDE 1000 ML: 9 INJECTION, SOLUTION INTRAVENOUS at 19:52

## 2020-12-20 RX ADMIN — POTASSIUM CHLORIDE 10 MEQ: 7.46 INJECTION, SOLUTION INTRAVENOUS at 20:14

## 2020-12-20 RX ADMIN — POTASSIUM CHLORIDE 30 MEQ: 1500 TABLET, EXTENDED RELEASE ORAL at 20:14

## 2020-12-20 ASSESSMENT — PAIN DESCRIPTION - PAIN TYPE
TYPE: ACUTE PAIN
TYPE: ACUTE PAIN

## 2020-12-20 ASSESSMENT — PAIN DESCRIPTION - ONSET: ONSET: ON-GOING

## 2020-12-20 ASSESSMENT — PAIN DESCRIPTION - PROGRESSION: CLINICAL_PROGRESSION: GRADUALLY WORSENING

## 2020-12-20 ASSESSMENT — PAIN SCALES - GENERAL
PAINLEVEL_OUTOF10: 7

## 2020-12-20 ASSESSMENT — PAIN DESCRIPTION - DESCRIPTORS
DESCRIPTORS: CONSTANT
DESCRIPTORS: CONSTANT

## 2020-12-20 ASSESSMENT — PAIN DESCRIPTION - FREQUENCY: FREQUENCY: CONTINUOUS

## 2020-12-20 ASSESSMENT — PAIN DESCRIPTION - LOCATION
LOCATION: ABDOMEN
LOCATION: ABDOMEN

## 2020-12-20 NOTE — ED NOTES
Patient's oxygen level is 89% on room air at this time. Placed on 2 liters of oxygen nasal cannula and up to 97%.  Patient states that she wears 2 liters of oxygen continuously at home     Eloise Denson RN  12/20/20 6193

## 2020-12-20 NOTE — ED TRIAGE NOTES
Arrived to [facility] via EMS with complaints of abdominal pain and diarrhea with bloody stools.

## 2020-12-20 NOTE — ED PROVIDER NOTES
EMERGENCY DEPARTMENT ENCOUNTER      PCP: Kaiser Tamayo DO    CHIEF COMPLAINT    Chief Complaint   Patient presents with    Abdominal Pain    Rectal Bleeding    Diarrhea       I have seen and evaluated this patient with Dr. Tyler Monday. HPI    Katt Melara is a 62 y.o. female who presents with concern for possible bowel obstruction. Patient had onset of nausea, vomiting and bloody diarrhea last night with associated lower abdominal pain. Patient says anytime she tries to use the restroom there is a lot of blood in the toilet, even if no diarrhea comes out. She is also having blood in her depends diapers in between trips to the restroom. She says she takes Plavix. She has a history of bowel obstructions and abdominal surgeries. The most recent was few months ago. He said she was in her normal level of health until yesterday evening when she began to have this pain nausea vomiting and bloody diarrhea. She denies any chest pain, shortness of breath, syncope or fatigue. No fevers. No urinary symptoms or complaints. REVIEW OF SYSTEMS    Constitutional:  Denies fever, chills, weight loss or weakness   HENT:  Denies sore throat or ear pain   Cardiovascular:  Denies chest pain, palpitations   Respiratory:  Denies cough or shortness of breath    GI: See HPI  :  Denies any urinary symptoms or vaginal symptoms.    Musculoskeletal:  Denies back pain  Skin:  Denies rash  Neurologic:  Denies headache, focal weakness or sensory changes   Endocrine:  Denies polyuria or polydypsia   Lymphatic:  Denies swollen glands     All other review of systems are negative  See HPI and nursing notes for additional information     PAST MEDICAL AND SURGICAL HISTORY    Past Medical History:   Diagnosis Date    Amphetamine abuse ([facility] [address] 2020    Cocaine abuse ([facility] [address] 2020    NSTEMI (non-ST elevated myocardial infarction) ([facility] [address] 04/2020     Past Surgical History:   Procedure Laterality Date    HEMICOLECTOMY Left 8/18/2020    #1 EXCISION OF LEFT HEMICOLOECTOMY WITH #2MOBILIZATION OF [facility] #3 SIDE BY SIDE ANASTOMOSIS #4RIGID PROCTOLOGY performed by Crispin Martin MD [address] 8/13/2020    SIGMOIDOSCOPY BIOPSY FLEXIBLE performed by Bg Avila MD at [address] [place] [address]    Current Outpatient Rx   Medication Sig Dispense Refill    ondansetron (ZOFRAN ODT) 4 MG disintegrating tablet Take 1 tablet by mouth every 8 hours as needed for Nausea 15 tablet 0    dicyclomine (BENTYL) 10 MG capsule Take 1 capsule by mouth 3 times daily As needed for abdominal pain 15 capsule 3    ciprofloxacin (CIPRO) 500 MG tablet Take 1 tablet by mouth 2 times daily for 7 days 14 tablet 0    metroNIDAZOLE (FLAGYL) 500 MG tablet Take 1 tablet by mouth 2 times daily for 7 days 14 tablet 0    acetaminophen (TYLENOL) 500 MG tablet Take 2 tablets by mouth 3 times daily as needed for Pain 180 tablet 0    carvedilol (COREG) 25 MG tablet Take 1 tablet by mouth 2 times daily (with meals) 60 tablet 1    amLODIPine (NORVASC) 10 MG tablet Take 1 tablet by mouth daily 30 tablet 1    docusate sodium (COLACE) 100 MG capsule Take 1 capsule by mouth 2 times daily 60 capsule 0    traZODone (DESYREL) 50 MG tablet Take 50 mg by mouth nightly      mometasone-formoterol (DULERA) 100-5 MCG/ACT inhaler Inhale 2 puffs into the lungs 2 times daily      levothyroxine (SYNTHROID) 100 MCG tablet Take 100 mcg by mouth Daily      gabapentin (NEURONTIN) 800 MG tablet Take 800 mg by mouth 3 times daily.       promethazine (PHENERGAN) 25 MG tablet Take 25 mg by mouth every 6 hours as needed for Nausea      albuterol sulfate HFA (VENTOLIN HFA) 108 (90 Base) MCG/ACT inhaler Inhale 1 puff into the lungs every 6 hours as needed for Wheezing      escitalopram (LEXAPRO) 20 MG tablet Take 20 mg by mouth daily      atorvastatin (LIPITOR) 80 MG tablet Take 80 mg by mouth nightly      aspirin 81 MG chewable tablet Take 81 mg by mouth daily      traMADol (ULTRAM) 50 MG tablet Take 50 mg by mouth every 6 hours as needed for Pain.  clopidogrel (PLAVIX) 75 MG tablet Take 75 mg by mouth daily      fluticasone-salmeterol (ADVAIR) 250-50 MCG/DOSE AEPB Inhale 1 puff into the lungs 2 times daily         ALLERGIES    No Known Allergies    SOCIAL AND FAMILY HISTORY    Social History     Socioeconomic History    Marital status:      Spouse name: None    Number of children: None    Years of education: None    Highest education level: None   Occupational History    None   Social Needs    Financial resource strain: None    Food insecurity     Worry: None     Inability: None    Transportation needs     Medical: None     Non-medical: None   Tobacco Use    Smoking status: Current Every Day Smoker    Smokeless tobacco: Never Used   Substance and Sexual Activity    Alcohol use: Never     Frequency: Never    Drug use: Yes     Types: Cocaine, Marijuana, Opiates , Methamphetamines     Comment: 2020 based on UDS    Sexual activity: None   Lifestyle    Physical activity     Days per week: None     Minutes per session: None    Stress: None   Relationships    Social connections     Talks on phone: None     Gets together: None     Attends [employer] witness service: None     Active member of club or organization: None     Attends meetings of clubs or organizations: None     Relationship status: None    Intimate partner violence     Fear of current or ex partner: None     Emotionally abused: None     Physically abused: None     Forced sexual activity: None   Other Topics Concern    None   Social History Narrative    None     History reviewed. No pertinent family history. PHYSICAL EXAM    VITAL SIGNS: BP (!) 147/84   Pulse 73   Temp 98.8 °F (37.1 °C) (Oral)   Resp 16   Ht 5' 2\" (1.575 m)   Wt 135 lb (61.2 kg)   SpO2 97%   BMI 24.69 kg/m²    Constitutional:  Well developed, wn, NAD  HENT:  Normocephalic, Atraumatic, PERRL. EOMI.  Sclera clear. Conjunctiva normal, No discharge. Neck/Lymphatics: supple, no JVD, no swollen nodes  Cardiovascular:  Rate 73, reg Rhythm,  no murmurs/rubs/gallops. Respiratory:  Nonlabored breathing. Normal breath sounds, No wheezing  Abdomen: Bowel sounds normal, Soft, No tenderness, no masses. RECTAL: Small amount of bloody stool smeared around the external rectum 1 small external hemorrhoid, no internal hemorrhoids or fissures, Hemoccult positive  Musculoskeletal:    There is no edema, asymmetry, or calf / thigh tenderness bilaterally. No cyanosis. No cool or pale-appearing limb. Distal cap refill and pulses intact bilateral upper and lower extremities  Bilateral upper and lower extremity ROM intact without pain or obvious deficit  Integument:  Warm, Dry  Neurologic: Alert & oriented , No focal deficits noted. Cranial nerves II through XII grossly intact. Normal gross motor coordination & motor strength bilateral upper and lower extremities  Sensation intact.   Psychiatric:  Affect normal, Mood normal.     Results for orders placed or performed during the hospital encounter of 12/20/20   CBC Auto Differential   Result Value Ref Range    WBC 11.1 (H) 4.0 - 10.5 K/CU MM    RBC 4.94 4.2 - 5.4 M/CU MM    Hemoglobin 14.3 12.5 - 16.0 GM/DL    Hematocrit 43.7 37 - 47 %    MCV 88.5 78 - 100 FL    MCH 28.9 27 - 31 PG    MCHC 32.7 32.0 - 36.0 %    RDW 15.1 (H) 11.7 - 14.9 %    Platelets 350 196 - 104 K/CU MM    MPV 8.7 7.5 - 11.1 FL    Differential Type AUTOMATED DIFFERENTIAL     Segs Relative 79.5 (H) 36 - 66 %    Lymphocytes % 13.3 (L) 24 - 44 %    Monocytes % 4.5 (H) 0 - 4 %    Eosinophils % 2.1 0 - 3 %    Basophils % 0.4 0 - 1 %    Segs Absolute 8.8 K/CU MM    Lymphocytes Absolute 1.5 K/CU MM    Monocytes Absolute 0.5 K/CU MM    Eosinophils Absolute 0.2 K/CU MM    Basophils Absolute 0.0 K/CU MM    Nucleated RBC % 0.0 %    Total Nucleated RBC 0.0 K/CU MM    Total Immature Neutrophil 0.02 K/CU MM Immature Neutrophil % 0.2 0 - 0.43 %   Comprehensive Metabolic Panel   Result Value Ref Range    Sodium 129 (L) 135 - 145 MMOL/L    Potassium 3.0 (LL) 3.5 - 5.1 MMOL/L    Chloride 99 99 - 110 mMol/L    CO2 19 (L) 21 - 32 MMOL/L    BUN 22 6 - 23 MG/DL    CREATININE 0.8 0.6 - 1.1 MG/DL    Glucose 91 70 - 99 MG/DL    Calcium 8.2 (L) 8.3 - 10.6 MG/DL    Alb 3.4 3.4 - 5.0 GM/DL    Total Protein 6.6 6.4 - 8.2 GM/DL    Total Bilirubin 0.4 0.0 - 1.0 MG/DL    ALT 14 10 - 40 U/L    AST 22 15 - 37 IU/L    Alkaline Phosphatase 85 40 - 128 IU/L    GFR Non-African American >60 >60 mL/min/1.73m2    GFR African American >60 >60 mL/min/1.73m2    Anion Gap 11 4 - 16   Lipase   Result Value Ref Range    Lipase 11 (L) 13 - 60 IU/L   Protime-INR   Result Value Ref Range    Protime 11.1 (L) 11.7 - 14.5 SECONDS    INR 0.92 INDEX       CT ABDOMEN PELVIS W IV CONTRAST Additional Contrast? None   Preliminary Result   1. Status post left hemicolectomy with pancolitis of the residual colon,   except for relative sparing of the cecum. No evidence of free   intraperitoneal air. ED COURSE & MEDICAL DECISION MAKING       Patient presents as above with concerns of abdominal pain, nausea, vomiting and bloody diarrhea. On exam she is mildly tender in the left lower quadrant, there is signs of subtle bleeding per rectum, Hemoccult positive. Otherwise physical exam is unremarkable, patient here with mildly elevated blood pressure, likely due to pain, other vital signs normal.    CT of the abdomen pelvis shows pancolitis of the residual colon sparing the cecum, no evidence of free intraperitoneal air or bowel obstruction. Routine labs reveal normal hemoglobin, white blood cell count of 11.1. Sodium 129, potassium 3.0, no other clinically significant derangements, lipase is not acutely elevated. PT 11.1 INR normal.    Patient's pain well controlled, she has been given Cipro and Flagyl, potassium repletion and fluid.   She will follow up with her general surgeon and the GI she saw when she was seen in the hospital.      Patient is amenable to this plan of care, time allotted for answering questions and return precautions reviewed at length. Patient agrees to return emergency department if symptoms worsen or any new symptoms develop. Vital signs and nursing notes reviewed during ED course. Clinical  IMPRESSION    1. Generalized abdominal pain    2. Colitis        Comment: Please note this report has been produced using speech recognition software and may contain errors related to that system including errors in grammar, punctuation, and spelling, as well as words and phrases that may be inappropriate. If there are any questions or concerns please feel free to contact the dictating provider for clarification.          [place]  12/23/20 2058

## 2020-12-21 NOTE — ED PROVIDER NOTES
K/CU MM    Lymphocytes Absolute 1.5 K/CU MM    Monocytes Absolute 0.5 K/CU MM    Eosinophils Absolute 0.2 K/CU MM    Basophils Absolute 0.0 K/CU MM    Nucleated RBC % 0.0 %    Total Nucleated RBC 0.0 K/CU MM    Total Immature Neutrophil 0.02 K/CU MM    Immature Neutrophil % 0.2 0 - 0.43 %   Comprehensive Metabolic Panel   Result Value Ref Range    Sodium 129 (L) 135 - 145 MMOL/L    Potassium 3.0 (LL) 3.5 - 5.1 MMOL/L    Chloride 99 99 - 110 mMol/L    CO2 19 (L) 21 - 32 MMOL/L    BUN 22 6 - 23 MG/DL    CREATININE 0.8 0.6 - 1.1 MG/DL    Glucose 91 70 - 99 MG/DL    Calcium 8.2 (L) 8.3 - 10.6 MG/DL    Alb 3.4 3.4 - 5.0 GM/DL    Total Protein 6.6 6.4 - 8.2 GM/DL    Total Bilirubin 0.4 0.0 - 1.0 MG/DL    ALT 14 10 - 40 U/L    AST 22 15 - 37 IU/L    Alkaline Phosphatase 85 40 - 128 IU/L    GFR Non-African American >60 >60 mL/min/1.73m2    GFR African American >60 >60 mL/min/1.73m2    Anion Gap 11 4 - 16   Lipase   Result Value Ref Range    Lipase 11 (L) 13 - 60 IU/L   Urinalysis   Result Value Ref Range    Color, UA YELLOW YELLOW    Clarity, UA CLEAR CLEAR    Glucose, Urine NEGATIVE NEGATIVE MG/DL    Bilirubin Urine NEGATIVE NEGATIVE MG/DL    Ketones, Urine SMALL (A) NEGATIVE MG/DL    Specific Gravity, UA 1.060 (H) 1.001 - 1.035    Blood, Urine NEGATIVE NEGATIVE    pH, Urine 5.0 5.0 - 8.0    Protein, UA NEGATIVE NEGATIVE MG/DL    Urobilinogen, Urine NORMAL 0.2 - 1.0 MG/DL    Nitrite Urine, Quantitative NEGATIVE NEGATIVE    Leukocyte Esterase, Urine NEGATIVE NEGATIVE    RBC, UA NONE SEEN 0 - 6 /HPF    WBC, UA NONE SEEN 0 - 5 /HPF    Bacteria, UA NEGATIVE NEGATIVE /HPF    Squam Epithel, UA <1 /HPF    Mucus, UA RARE (A) NEGATIVE HPF    Trichomonas, UA NONE SEEN NONE SEEN /HPF    Hyaline Casts, UA 0 /LPF   Protime-INR   Result Value Ref Range    Protime 11.1 (L) 11.7 - 14.5 SECONDS    INR 0.92 INDEX   Urine Drug Screen   Result Value Ref Range    Cannabinoid Scrn, Ur UNCONFIRMED POSITIVE (A) NEGATIVE    Amphetamines UNCONFIRMED POSITIVE (A) NEGATIVE    Cocaine Metabolite NEGATIVE NEGATIVE    Benzodiazepine Screen, Urine NEGATIVE NEGATIVE    Barbiturate Screen, Ur NEGATIVE NEGATIVE    Opiates, Urine UNCONFIRMED POSITIVE (A) NEGATIVE    Phencyclidine, Urine NEGATIVE NEGATIVE    Oxycodone NEGATIVE NEGATIVE         ED course: Pt presents as above. Emergent conditions considered. Presentation prompted initial labs and imaging. Work-up is reassuring and his hemoglobin is 14 and with over a day of symptoms and no brisk hemorrhaging on exam at this time and stable vital signs doubt brisk GI bleed. Potassium is low at 3.0 and this is repleted. Sodium is also marginally low at 129 and she did receive IV fluids of normal saline for this. CT imaging demonstrating a pancolitis of the residual colon. I will place patient on Cipro and Flagyl with first dose given in the emergency department. Urinalysis is not consistent with urinary tract infection. Urine drug screen just confirm amphetamine use and marijuana use and is also positive for opiates over there given in the emergency department. Patient is hypertensive but otherwise hemodynamically stable with benign abdomen and no brisk bleeding in the emergency department; appropriate for outpatient follow-up. Questions sought and answered with the patient. They voice understanding and agree with plan. Instructed to return for any worsening or worrisome concerns. All diagnostic, treatment, and disposition decisions were made by myself in conjunction with the Advanced Practice Provider. For all further details of the patient's emergency department visit, please see the Advanced Practice Provider's documentation.        Luci Kohli MD  12/20/20 7084

## 2020-12-21 NOTE — ED NOTES
Assisted Binh BAUER at this time with rectal exam. Patient tolerated well.  Occult stool card obtained     Tandy Mcardle, RN  12/20/20 2118

## 2021-01-15 ENCOUNTER — HOSPITAL ENCOUNTER (OUTPATIENT)
Dept: LAB | Age: 61
Discharge: HOME | End: 2021-01-15
Payer: MEDICARE

## 2021-01-15 LAB
FERRITIN: 20 NG/ML (ref 15–150)
HBV SURFACE AB TITR SER: <3.5 {TITER}
HEPATITIS B SURFACE ANTIGEN: NON REACTIVE
HEPATITIS C ANTIBODY: ABNORMAL
IGA: 521 MG/DL (ref 69–382)
IGG,SERUM: 1062 MG/DL (ref 723–1685)
IGM,SERUM: 52 MG/DL (ref 62–277)
IRON: 34 UG/DL (ref 37–145)
PCT TRANSFERRIN: 8 % (ref 10–44)
TOTAL IRON BINDING CAPACITY: 420 UG/DL (ref 250–450)
UNSATURATED IRON BINDING CAPACITY: 386 UG/DL (ref 110–370)

## 2021-01-15 PROCEDURE — 82390 ASSAY OF CERULOPLASMIN: CPT

## 2021-01-15 PROCEDURE — 84520 ASSAY OF UREA NITROGEN: CPT

## 2021-01-15 PROCEDURE — 83540 ASSAY OF IRON: CPT

## 2021-01-15 PROCEDURE — 82103 ALPHA-1-ANTITRYPSIN TOTAL: CPT

## 2021-01-15 PROCEDURE — 87340 HEPATITIS B SURFACE AG IA: CPT

## 2021-01-15 PROCEDURE — 87389 HIV-1 AG W/HIV-1&-2 AB AG IA: CPT

## 2021-01-15 PROCEDURE — 87902 NFCT AGT GNTYP ALYS HEP C: CPT

## 2021-01-15 PROCEDURE — 83550 IRON BINDING TEST: CPT

## 2021-01-15 PROCEDURE — 86706 HEP B SURFACE ANTIBODY: CPT

## 2021-01-15 PROCEDURE — 86038 ANTINUCLEAR ANTIBODIES: CPT

## 2021-01-15 PROCEDURE — 82784 ASSAY IGA/IGD/IGG/IGM EACH: CPT

## 2021-01-15 PROCEDURE — 86039 ANTINUCLEAR ANTIBODIES (ANA): CPT

## 2021-01-15 PROCEDURE — 83516 IMMUNOASSAY NONANTIBODY: CPT

## 2021-01-15 PROCEDURE — 87517 HEPATITIS B DNA QUANT: CPT

## 2021-01-15 PROCEDURE — C9803 HOPD COVID-19 SPEC COLLECT: HCPCS

## 2021-01-15 PROCEDURE — 82977 ASSAY OF GGT: CPT

## 2021-01-15 PROCEDURE — 84460 ALANINE AMINO (ALT) (SGPT): CPT

## 2021-01-15 PROCEDURE — 36415 COLL VENOUS BLD VENIPUNCTURE: CPT

## 2021-01-15 PROCEDURE — U0002 COVID-19 LAB TEST NON-CDC: HCPCS

## 2021-01-15 PROCEDURE — 84450 TRANSFERASE (AST) (SGOT): CPT

## 2021-01-15 PROCEDURE — 83883 ASSAY NEPHELOMETRY NOT SPEC: CPT

## 2021-01-15 PROCEDURE — 82728 ASSAY OF FERRITIN: CPT

## 2021-01-15 PROCEDURE — 87522 HEPATITIS C REVRS TRNSCRPJ: CPT

## 2021-01-15 PROCEDURE — 86708 HEPATITIS A ANTIBODY: CPT

## 2021-01-15 PROCEDURE — 86803 HEPATITIS C AB TEST: CPT

## 2021-01-16 LAB
ALPHA-1 ANTITRYPSIN: 173 MG/DL (ref 90–200)
HAV AB SERPL IA-ACNC: NEGATIVE
SARS-COV-2: NOT DETECTED
SOURCE: NORMAL

## 2021-01-17 LAB
ANTI-NUCLEAR ANTIBODY (ANA): DETECTED
F-ACTIN AB, IGG: 10 UNITS (ref 0–19)

## 2021-01-18 LAB
ANTINUCLEAR ANTIBODY, HEP-2, IGG: NORMAL
HIV SCREEN: NON REACTIVE
INTERPRETATION: NORMAL

## 2021-01-19 LAB
ALANINE AMINOTRANSFERASE, FIBROMETER: 17 U/L (ref 5–40)
ALPHA-2-MACROGLOBULIN, FIBROMETER: 142 MG/DL (ref 131–293)
ASPARTATE AMINOTRANSFERASE, FIBROMETER: 25 U/L (ref 9–40)
CIRRHOMETER PATIENT SCORE: 0
EER FIBROMETER REPORT: NORMAL
FIBROMETER INTERPRETATION: NORMAL
FIBROMETER PATIENT SCORE: 0.07
FIBROMETER PLATELET COUNT: 305
FIBROMETER PROTHROMBIN INDEX: 118 % (ref 90–120)
FIBROSIS METAVIR CLASSIFICATION: NORMAL
GAMMA GLUTAMYL TRANSFERASE, FIBROMETER: 12 U/L (ref 7–33)
HBV QNT LOG, IU/ML: NOT DETECTED LOG IU/ML
HBV QNT, IU/ML: NOT DETECTED IU/ML
HCV QUANTITATIVE: ABNORMAL IU/ML
HEP CRNA PCR QNT INTERP: DETECTED
HEPATITIS C GENOTYPE: NORMAL
HEPATITIS C RNA PCR QUANT: 6.49 LOG IU/ML
INFLAMETER METAVIR CLASSIFICATION: NORMAL
INFLAMETER PATIENT SCORE: 0.09
INTERPRETATION: NOT DETECTED
UREA NITROGEN, FIBROMETER: 18 MG/DL (ref 7–20)

## 2021-01-20 ENCOUNTER — ANESTHESIA EVENT (OUTPATIENT)
Dept: ENDOSCOPY | Age: 61
End: 2021-01-20

## 2021-01-20 LAB — CERULOPLASMIN: 35 MG/DL (ref 17–54)

## 2021-01-20 ASSESSMENT — LIFESTYLE VARIABLES: SMOKING_STATUS: 1

## 2021-01-20 NOTE — ANESTHESIA PRE PROCEDURE
Department of Anesthesiology  Preprocedure Note       Name:  Eva Blizzard   Age:  62 y.o.  :  1960                                          MRN:  [id number]         Date:  2021      Surgeon: Pedro Moya):  Amy Sherwood MD    Procedure: Procedure(s):  COLONOSCOPY DIAGNOSTIC    Medications prior to admission:   Prior to Admission medications    Medication Sig Start Date End Date Taking? Authorizing Provider   ondansetron (ZOFRAN ODT) 4 MG disintegrating tablet Take 1 tablet by mouth every 8 hours as needed for Nausea 20   [place]   dicyclomine (BENTYL) 10 MG capsule Take 1 capsule by mouth 3 times daily As needed for abdominal pain 20   [place]   acetaminophen (TYLENOL) 500 MG tablet Take 2 tablets by mouth 3 times daily as needed for Pain 20   [place]   carvedilol (COREG) 25 MG tablet Take 1 tablet by mouth 2 times daily (with meals) 20   Helena Guadalupe MD   amLODIPine (NORVASC) 10 MG tablet Take 1 tablet by mouth daily 20   Helena Guadalupe MD   docusate sodium (COLACE) 100 MG capsule Take 1 capsule by mouth 2 times daily 20   Helena Guadalupe MD   traZODone (DESYREL) 50 MG tablet Take 50 mg by mouth nightly    Historical Provider, MD   mometasone-formoterol (DULERA) 100-5 MCG/ACT inhaler Inhale 2 puffs into the lungs 2 times daily    Historical Provider, MD   levothyroxine (SYNTHROID) 100 MCG tablet Take 100 mcg by mouth Daily    Historical Provider, MD   gabapentin (NEURONTIN) 800 MG tablet Take 800 mg by mouth 3 times daily.     Historical Provider, MD   promethazine (PHENERGAN) 25 MG tablet Take 25 mg by mouth every 6 hours as needed for Nausea    Historical Provider, MD   albuterol sulfate HFA (VENTOLIN HFA) 108 (90 Base) MCG/ACT inhaler Inhale 1 puff into the lungs every 6 hours as needed for Wheezing    Historical Provider, MD   escitalopram (LEXAPRO) 20 MG tablet Take 20 mg by mouth daily    Historical Provider, MD atorvastatin (LIPITOR) 80 MG tablet Take 80 mg by mouth nightly    Historical Provider, MD   aspirin 81 MG chewable tablet Take 81 mg by mouth daily    Historical Provider, MD   traMADol (ULTRAM) 50 MG tablet Take 50 mg by mouth every 6 hours as needed for Pain. Historical Provider, MD   clopidogrel (PLAVIX) 75 MG tablet Take 75 mg by mouth daily    Historical Provider, MD   fluticasone-salmeterol (ADVAIR) 250-50 MCG/DOSE AEPB Inhale 1 puff into the lungs 2 times daily    Historical Provider, MD       Current medications:    No current facility-administered medications for this encounter. Current Outpatient Medications   Medication Sig Dispense Refill    ondansetron (ZOFRAN ODT) 4 MG disintegrating tablet Take 1 tablet by mouth every 8 hours as needed for Nausea 15 tablet 0    dicyclomine (BENTYL) 10 MG capsule Take 1 capsule by mouth 3 times daily As needed for abdominal pain 15 capsule 3    acetaminophen (TYLENOL) 500 MG tablet Take 2 tablets by mouth 3 times daily as needed for Pain 180 tablet 0    carvedilol (COREG) 25 MG tablet Take 1 tablet by mouth 2 times daily (with meals) 60 tablet 1    amLODIPine (NORVASC) 10 MG tablet Take 1 tablet by mouth daily 30 tablet 1    docusate sodium (COLACE) 100 MG capsule Take 1 capsule by mouth 2 times daily 60 capsule 0    traZODone (DESYREL) 50 MG tablet Take 50 mg by mouth nightly      mometasone-formoterol (DULERA) 100-5 MCG/ACT inhaler Inhale 2 puffs into the lungs 2 times daily      levothyroxine (SYNTHROID) 100 MCG tablet Take 100 mcg by mouth Daily      gabapentin (NEURONTIN) 800 MG tablet Take 800 mg by mouth 3 times daily.       promethazine (PHENERGAN) 25 MG tablet Take 25 mg by mouth every 6 hours as needed for Nausea      albuterol sulfate HFA (VENTOLIN HFA) 108 (90 Base) MCG/ACT inhaler Inhale 1 puff into the lungs every 6 hours as needed for Wheezing      escitalopram (LEXAPRO) 20 MG tablet Take 20 mg by mouth daily There is no height or weight on file to calculate BMI.    CBC:   Lab Results   Component Value Date    WBC 11.1 12/20/2020    RBC 4.94 12/20/2020    HGB 14.3 12/20/2020    HCT 43.7 12/20/2020    MCV 88.5 12/20/2020    RDW 15.1 12/20/2020     12/20/2020       CMP:   Lab Results   Component Value Date     12/20/2020    K 3.0 12/20/2020    CL 99 12/20/2020    CO2 19 12/20/2020    BUN 22 12/20/2020    CREATININE 0.8 12/20/2020    GFRAA >60 12/20/2020    LABGLOM >60 12/20/2020    GLUCOSE 91 12/20/2020    PROT 6.6 12/20/2020    CALCIUM 8.2 12/20/2020    BILITOT 0.4 12/20/2020    ALKPHOS 85 12/20/2020    AST 22 12/20/2020    ALT 14 12/20/2020       POC Tests: No results for input(s): POCGLU, POCNA, POCK, POCCL, POCBUN, POCHEMO, POCHCT in the last 72 hours. Coags:   Lab Results   Component Value Date    PROTIME 11.1 12/20/2020    INR 0.92 12/20/2020    APTT 27.8 08/11/2020       HCG (If Applicable): No results found for: PREGTESTUR, PREGSERUM, HCG, HCGQUANT     ABGs: No results found for: PHART, PO2ART, GZO6JZT, SGY1ABP, BEART, B3UDMBYU     Type & Screen (If Applicable):  No results found for: LABABO, LABRH    Drug/Infectious Status (If Applicable):  Lab Results   Component Value Date    HEPCAB REPEATEDLY REACTIVE 01/15/2021       COVID-19 Screening (If Applicable):   Lab Results   Component Value Date    COVID19 NOT DETECTED 01/15/2021         Anesthesia Evaluation  Patient summary reviewed  Airway:         Dental:          Pulmonary:   (+) current smoker                           Cardiovascular:    (+) past MI:,          Beta Blocker:  Order written      ROS comment: Summary 8/2020:   Normal Stress nuclear scintigraphic study suggestive of normal myocardial   perfusion. Gated images demonstrate normal left ventricular systolic   function with EF of 60 %.          Neuro/Psych:   Negative Neuro/Psych ROS              GI/Hepatic/Renal:   (+) bowel prep,           Endo/Other: Negative Endo/Other ROS Abdominal:           Vascular: negative vascular ROS. Anesthesia Plan      MAC     ASA 3       Induction: intravenous. JORDIN Burton - CRNA   1/20/2021         Pre Anesthesia Assessment complete.  Chart reviewed on 1/20/2021   BMP and urine drug screen ordered

## 2021-01-21 ENCOUNTER — HOSPITAL ENCOUNTER (OUTPATIENT)
Age: 61
Setting detail: OUTPATIENT SURGERY
Discharge: HOME | End: 2021-01-21
Attending: INTERNAL MEDICINE | Admitting: INTERNAL MEDICINE
Payer: MEDICARE

## 2021-01-21 ENCOUNTER — ANESTHESIA (OUTPATIENT)
Dept: ENDOSCOPY | Age: 61
End: 2021-01-21

## 2021-01-21 VITALS
BODY MASS INDEX: 23.92 KG/M2 | HEIGHT: 62 IN | WEIGHT: 130 LBS | OXYGEN SATURATION: 94 % | RESPIRATION RATE: 16 BRPM | SYSTOLIC BLOOD PRESSURE: 140 MMHG | TEMPERATURE: 97.8 F | HEART RATE: 51 BPM | DIASTOLIC BLOOD PRESSURE: 82 MMHG

## 2021-01-21 LAB
AMPHETAMINES: ABNORMAL
ANION GAP SERPL CALCULATED.3IONS-SCNC: 9 MMOL/L (ref 4–16)
BARBITURATE SCREEN URINE: NEGATIVE
BENZODIAZEPINE SCREEN, URINE: NEGATIVE
BUN BLDV-MCNC: 8 MG/DL (ref 6–23)
CALCIUM SERPL-MCNC: 8.5 MG/DL (ref 8.3–10.6)
CANNABINOID SCREEN URINE: ABNORMAL
CHLORIDE BLD-SCNC: 105 MMOL/L (ref 99–110)
CO2: 24 MMOL/L (ref 21–32)
COCAINE METABOLITE: ABNORMAL
CREAT SERPL-MCNC: 0.7 MG/DL (ref 0.6–1.1)
GFR AFRICAN AMERICAN: >60 ML/MIN/1.73M2
GFR NON-AFRICAN AMERICAN: >60 ML/MIN/1.73M2
GLUCOSE BLD-MCNC: 83 MG/DL (ref 70–99)
OPIATES, URINE: NEGATIVE
OXYCODONE: NEGATIVE
PHENCYCLIDINE, URINE: NEGATIVE
POTASSIUM SERPL-SCNC: 3.4 MMOL/L (ref 3.5–5.1)
SODIUM BLD-SCNC: 138 MMOL/L (ref 135–145)

## 2021-01-21 PROCEDURE — 80048 BASIC METABOLIC PNL TOTAL CA: CPT

## 2021-01-21 PROCEDURE — 80307 DRUG TEST PRSMV CHEM ANLYZR: CPT

## 2021-01-21 PROCEDURE — 2580000003 HC RX 258: Performed by: ANESTHESIOLOGY

## 2021-01-21 RX ORDER — SODIUM CHLORIDE, SODIUM LACTATE, POTASSIUM CHLORIDE, CALCIUM CHLORIDE 600; 310; 30; 20 MG/100ML; MG/100ML; MG/100ML; MG/100ML
INJECTION, SOLUTION INTRAVENOUS CONTINUOUS
Status: DISCONTINUED | OUTPATIENT
Start: 2021-01-21 | End: 2021-01-21 | Stop reason: HOSPADM

## 2021-01-21 RX ADMIN — SODIUM CHLORIDE, POTASSIUM CHLORIDE, SODIUM LACTATE AND CALCIUM CHLORIDE: 600; 310; 30; 20 INJECTION, SOLUTION INTRAVENOUS at 12:50

## 2021-01-21 NOTE — PROGRESS NOTES
Urine tox screen on DOS shows positive for THC, cocaine, and methamphetamine. Despite patient denial of drug use, case is cancelled. Surgeon notified.

## 2021-01-21 NOTE — H&P
Shagufta Guerrero [address] [facility] Gastroenterology   Pre-operative History and Physical    Patient: Roxie Alicea  : 1960      History Obtained From:  patient        HISTORY OF PRESENT ILLNESS:                The patient is a 62 y.o. female with significant past medical history as below who presents for C scope    Indication rectal bleed    Past Medical History:        Diagnosis Date    Amphetamine abuse ([facility]     Cocaine abuse ([facility]     NSTEMI (non-ST elevated myocardial infarction) ([facility] [address] [facility] 2020       Past Surgical History:        Procedure Laterality Date    HEMICOLECTOMY Left 2020    #1 EXCISION OF LEFT HEMICOLOECTOMY WITH #2MOBILIZATION OF Anika [place] #3 SIDE BY SIDE ANASTOMOSIS #4RIGID PROCTOLOGY performed by Michael Patel MD at [address] 2020    SIGMOIDOSCOPY BIOPSY FLEXIBLE performed by Vasquez Cruz MD at [facility] ENDOSCOPY         Current Medications:    Medications    Prior to Admission medications    Medication Sig Start Date End Date Taking? Authorizing Provider   carvedilol (COREG) 25 MG tablet Take 1 tablet by mouth 2 times daily (with meals) 20  Yes Tristan Ross MD   amLODIPine (NORVASC) 10 MG tablet Take 1 tablet by mouth daily 20  Yes Tristan Ross MD   levothyroxine (SYNTHROID) 100 MCG tablet Take 100 mcg by mouth Daily   Yes Historical Provider, MD   gabapentin (NEURONTIN) 800 MG tablet Take 800 mg by mouth 3 times daily.    Yes Historical Provider, MD   albuterol sulfate HFA (VENTOLIN HFA) 108 (90 Base) MCG/ACT inhaler Inhale 1 puff into the lungs every 6 hours as needed for Wheezing   Yes Historical Provider, MD   escitalopram (LEXAPRO) 20 MG tablet Take 20 mg by mouth daily   Yes Historical Provider, MD   atorvastatin (LIPITOR) 80 MG tablet Take 80 mg by mouth nightly   Yes Historical Provider, MD   aspirin 81 MG chewable tablet Take 81 mg by mouth daily   Yes Historical Provider, MD   fluticasone-salmeterol (ADVAIR) 250-50 MCG/DOSE AEPB Inhale 1 puff into the lungs 2 times daily   Yes Historical Provider, MD   ondansetron (ZOFRAN ODT) 4 MG disintegrating tablet Take 1 tablet by mouth every 8 hours as needed for Nausea 12/20/20   LIZETTE Hodge   dicyclomine (BENTYL) 10 MG capsule Take 1 capsule by mouth 3 times daily As needed for abdominal pain 12/20/20   LIZETTE Hodge   acetaminophen (TYLENOL) 500 MG tablet Take 2 tablets by mouth 3 times daily as needed for Pain 12/20/20   LIZETTE Hodge   docusate sodium (COLACE) 100 MG capsule Take 1 capsule by mouth 2 times daily 8/24/20   Tiffanie Head MD   traZODone (DESYREL) 50 MG tablet Take 50 mg by mouth nightly    Historical Provider, MD   mometasone-formoterol (DULERA) 100-5 MCG/ACT inhaler Inhale 2 puffs into the lungs 2 times daily    Historical Provider, MD   promethazine (PHENERGAN) 25 MG tablet Take 25 mg by mouth every 6 hours as needed for Nausea    Historical Provider, MD   traMADol (ULTRAM) 50 MG tablet Take 50 mg by mouth every 6 hours as needed for Pain. Historical Provider, MD   clopidogrel (PLAVIX) 75 MG tablet Take 75 mg by mouth daily    Historical Provider, MD      Scheduled Medications:   Infusions:   PRN Medications: Allergies: No Known Allergies      Allergies:  Patient has no known allergies. Social History:   TOBACCO:   reports that she has been smoking. She has never used smokeless tobacco.  ETOH:   reports no history of alcohol use. Family History:   No family history on file.     REVIEW OF SYSTEMS:    Negative except for HPI        PHYSICAL EXAM:      Vitals:    BP (!) 140/82   Pulse 51   Temp 97.8 °F (36.6 °C) (Temporal)   Resp 16   Ht 5' 2\" (1.575 m)   Wt 130 lb (59 kg)   SpO2 94%   BMI 23.78 kg/m²     General Appearance:    Alert, cooperative, no distress, appears stated age   HEENT:    NO anemia, No jaundice , no Cyanosis, Supple neck   Neck:   Supple, symmetrical, trachea midline, no adenopathy;     thyroid: Lungs:     Clear to auscultation bilaterally, respirations unlabored   Chest Wall:    No tenderness or deformity    Heart:    Regular rate and rhythm, S1 and S2 normal, no murmur, rub   or gallop   Abdomen:     Soft, non-tender, bowel sounds active all four quadrants,     no masses, no organomegaly, no ascitis   Rectal:    Planned at time of C scope   Extremities:   Extremities normal, atraumatic, no cyanosis or edema   Pulses:   2+ and symmetric all extremities   Skin:   Skin color, texture, turgor normal, no rashes or lesions   Lymph nodes:   No abnormality   Neurologic:   No focal deficits, moving all four extremities      ASA Grade:  ASA 3 - Patient with moderate systemic disease with functional limitations  Air Way:    Prior Anesthesia complication: NILL    ASSESSMENT AND PLAN:    1. Patient is a 62 y.o. female here for colonoscopy with deep sedation  2. Procedure options, risks and benefits reviewed with patient. Patient expresses understanding.     Rosario Balderas  1/21/2021  12:18 PM

## 2021-01-21 NOTE — CARE COORDINATION
CM - call from [facility] --pt in [facility] was stick  there without a ride for the next 2-3 hours-- pt was to have a surgical procedure with endoscopy but was denied due to a failed drug test , Pt is able to walk about under her own power without assist . Convenient transport will not be able to assist ,She is inpatient , asking when her ride will be here  Just 8 minutes after her ride was scheduled .  Pt presentation was of concern to security in the [facility] --call made to Joshua Reis / [address] / Edita Fraga

## 2021-01-21 NOTE — PROGRESS NOTES
Patient very drowsy during pre op,this nurse  had to wake her up several times to get through pre op questions. Pt states she did use marijuana yesterday, it had been a couple weeks since her last use of methamphetamine,and she no longer uses cocaine.

## 2021-01-21 NOTE — PROGRESS NOTES
Patient informed  Of positive drug screen and per anesthesia her case is cancelled, education provided on importance of not taking drugs prior to procedure for her own safety. Patient states \" I haven't done those drugs\" This RN reinforced her drug screen was positive for those drugs. Patient voices understanding.

## 2021-03-10 LAB
A/G RATIO: 1.4 (ref 1.2–2.2)
ALBUMIN SERPL-MCNC: 4.3 G/DL (ref 3.8–4.9)
ALP BLD-CCNC: 79 IU/L (ref 39–117)
ALT SERPL-CCNC: 15 IU/L (ref 0–32)
AST SERPL-CCNC: 21 IU/L (ref 0–40)
BASOPHILS ABSOLUTE: 0.1 X10E3/UL (ref 0–0.2)
BASOPHILS RELATIVE PERCENT: 1 %
BILIRUB SERPL-MCNC: 0.3 MG/DL (ref 0–1.2)
BUN / CREAT RATIO: 21 (ref 12–28)
BUN BLDV-MCNC: 14 MG/DL (ref 8–27)
CALCIUM SERPL-MCNC: 9.2 MG/DL (ref 8.7–10.3)
CHLORIDE BLD-SCNC: 96 MMOL/L (ref 96–106)
CHOLESTEROL, TOTAL: 237 MG/DL (ref 100–199)
CO2: 20 MMOL/L (ref 20–29)
CREAT SERPL-MCNC: 0.67 MG/DL (ref 0.57–1)
EOSINOPHILS ABSOLUTE: 0.3 X10E3/UL (ref 0–0.4)
EOSINOPHILS RELATIVE PERCENT: 4 %
GFR AFRICAN AMERICAN: 110 ML/MIN/1.73
GFR NON-AFRICAN AMERICAN: 96 ML/MIN/1.73
GLOBULIN: 3.1 G/DL (ref 1.5–4.5)
GLUCOSE BLD-MCNC: 81 MG/DL (ref 65–99)
HCT VFR BLD CALC: 39 % (ref 34–46.6)
HDLC SERPL-MCNC: 76 MG/DL
HEMOGLOBIN: 13.3 G/DL (ref 11.1–15.9)
IMMATURE GRANS (ABS): 0 X10E3/UL (ref 0–0.1)
IMMATURE GRANULOCYTES: 0 %
LDL CHOLESTEROL CALCULATED: 149 MG/DL (ref 0–99)
LYMPHOCYTES ABSOLUTE: 2.4 X10E3/UL (ref 0.7–3.1)
LYMPHOCYTES RELATIVE PERCENT: 31 %
MCH RBC QN AUTO: 29.9 PG (ref 26.6–33)
MCHC RBC AUTO-ENTMCNC: 34.1 G/DL (ref 31.5–35.7)
MCV RBC AUTO: 88 FL (ref 79–97)
MONOCYTES ABSOLUTE: 0.6 X10E3/UL (ref 0.1–0.9)
MONOCYTES RELATIVE PERCENT: 8 %
NEUTROPHILS ABSOLUTE: 4.3 X10E3/UL (ref 1.4–7)
PDW BLD-RTO: 14.1 % (ref 11.7–15.4)
PLATELET # BLD: 379 X10E3/UL (ref 150–450)
POTASSIUM SERPL-SCNC: 4.2 MMOL/L (ref 3.5–5.2)
RBC # BLD: 4.45 X10E6/UL (ref 3.77–5.28)
SEGMENTED NEUTROPHILS RELATIVE PERCENT: 56 %
SODIUM BLD-SCNC: 134 MMOL/L (ref 134–144)
T4 TOTAL: 6.8 UG/DL (ref 4.5–12)
TOTAL PROTEIN: 7.4 G/DL (ref 6–8.5)
TRIGL SERPL-MCNC: 71 MG/DL (ref 0–149)
TSH SERPL DL<=0.05 MIU/L-ACNC: 4.01 UIU/ML (ref 0.45–4.5)
VLDLC SERPL CALC-MCNC: 12 MG/DL (ref 5–40)
WBC # BLD: 7.7 X10E3/UL (ref 3.4–10.8)

## 2021-05-08 ENCOUNTER — HOSPITAL ENCOUNTER (EMERGENCY)
Age: 61
Discharge: HOME | End: 2021-05-08
Attending: EMERGENCY MEDICINE
Payer: MEDICARE

## 2021-05-08 ENCOUNTER — APPOINTMENT (OUTPATIENT)
Dept: CT IMAGING | Age: 61
End: 2021-05-08
Payer: MEDICARE

## 2021-05-08 ENCOUNTER — APPOINTMENT (OUTPATIENT)
Dept: GENERAL RADIOLOGY | Age: 61
End: 2021-05-08
Payer: MEDICARE

## 2021-05-08 VITALS
RESPIRATION RATE: 16 BRPM | SYSTOLIC BLOOD PRESSURE: 170 MMHG | DIASTOLIC BLOOD PRESSURE: 91 MMHG | TEMPERATURE: 97.6 F | BODY MASS INDEX: 24.84 KG/M2 | WEIGHT: 135 LBS | HEIGHT: 62 IN | HEART RATE: 68 BPM | OXYGEN SATURATION: 98 %
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
## 2021-05-08 DIAGNOSIS — S09.8XXA BLUNT HEAD TRAUMA, INITIAL ENCOUNTER: ICD-10-CM

## 2021-05-08 DIAGNOSIS — Z79.02 PLATELET INHIBITION DUE TO PLAVIX: ICD-10-CM

## 2021-05-08 LAB
ANION GAP SERPL CALCULATED.3IONS-SCNC: 6 MMOL/L (ref 4–16)
BASOPHILS ABSOLUTE: 0.1 K/CU MM
BASOPHILS RELATIVE PERCENT: 0.5 % (ref 0–1)
BUN BLDV-MCNC: 16 MG/DL (ref 6–23)
CALCIUM SERPL-MCNC: 8.7 MG/DL (ref 8.3–10.6)
CHLORIDE BLD-SCNC: 101 MMOL/L (ref 99–110)
CO2: 23 MMOL/L (ref 21–32)
CREAT SERPL-MCNC: 0.8 MG/DL (ref 0.6–1.1)
DIFFERENTIAL TYPE: ABNORMAL
EOSINOPHILS ABSOLUTE: 0.3 K/CU MM
EOSINOPHILS RELATIVE PERCENT: 2.9 % (ref 0–3)
GFR AFRICAN AMERICAN: >60 ML/MIN/1.73M2
GFR NON-AFRICAN AMERICAN: >60 ML/MIN/1.73M2
GLUCOSE BLD-MCNC: 153 MG/DL (ref 70–99)
HCT VFR BLD CALC: 35.8 % (ref 37–47)
HEMOGLOBIN: 12 GM/DL (ref 12.5–16)
IMMATURE NEUTROPHIL %: 0.4 % (ref 0–0.43)
LYMPHOCYTES ABSOLUTE: 1.3 K/CU MM
LYMPHOCYTES RELATIVE PERCENT: 12.4 % (ref 24–44)
MAGNESIUM: 1.6 MG/DL (ref 1.8–2.4)
MCH RBC QN AUTO: 29.9 PG (ref 27–31)
MCHC RBC AUTO-ENTMCNC: 33.5 % (ref 32–36)
MCV RBC AUTO: 89.1 FL (ref 78–100)
MONOCYTES ABSOLUTE: 0.6 K/CU MM
MONOCYTES RELATIVE PERCENT: 5.8 % (ref 0–4)
NUCLEATED RBC %: 0 %
PDW BLD-RTO: 14.2 % (ref 11.7–14.9)
PLATELET # BLD: 252 K/CU MM (ref 140–440)
PMV BLD AUTO: 8.7 FL (ref 7.5–11.1)
POTASSIUM SERPL-SCNC: 2.9 MMOL/L (ref 3.5–5.1)
RBC # BLD: 4.02 M/CU MM (ref 4.2–5.4)
REASON FOR REJECTION: NORMAL
REJECTED TEST: NORMAL
SEGMENTED NEUTROPHILS ABSOLUTE COUNT: 7.9 K/CU MM
SEGMENTED NEUTROPHILS RELATIVE PERCENT: 78 % (ref 36–66)
SODIUM BLD-SCNC: 130 MMOL/L (ref 135–145)
TOTAL CK: 359 IU/L (ref 26–140)
TOTAL IMMATURE NEUTOROPHIL: 0.04 K/CU MM
TOTAL NUCLEATED RBC: 0 K/CU MM
WBC # BLD: 10.2 K/CU MM (ref 4–10.5)

## 2021-05-08 PROCEDURE — 96365 THER/PROPH/DIAG IV INF INIT: CPT

## 2021-05-08 PROCEDURE — 96366 THER/PROPH/DIAG IV INF ADDON: CPT

## 2021-05-08 PROCEDURE — 72170 X-RAY EXAM OF PELVIS: CPT

## 2021-05-08 PROCEDURE — 72125 CT NECK SPINE W/O DYE: CPT

## 2021-05-08 PROCEDURE — 83735 ASSAY OF MAGNESIUM: CPT

## 2021-05-08 PROCEDURE — 80048 BASIC METABOLIC PNL TOTAL CA: CPT

## 2021-05-08 PROCEDURE — 71046 X-RAY EXAM CHEST 2 VIEWS: CPT

## 2021-05-08 PROCEDURE — 96368 THER/DIAG CONCURRENT INF: CPT

## 2021-05-08 PROCEDURE — 6370000000 HC RX 637 (ALT 250 FOR IP): Performed by: EMERGENCY MEDICINE

## 2021-05-08 PROCEDURE — 6360000002 HC RX W HCPCS: Performed by: EMERGENCY MEDICINE

## 2021-05-08 PROCEDURE — 85025 COMPLETE CBC W/AUTO DIFF WBC: CPT

## 2021-05-08 PROCEDURE — 70450 CT HEAD/BRAIN W/O DYE: CPT

## 2021-05-08 PROCEDURE — 82550 ASSAY OF CK (CPK): CPT

## 2021-05-08 PROCEDURE — 6370000000 HC RX 637 (ALT 250 FOR IP)

## 2021-05-08 PROCEDURE — 99285 EMERGENCY DEPT VISIT HI MDM: CPT

## 2021-05-08 RX ORDER — MAGNESIUM SULFATE IN WATER 40 MG/ML
2000 INJECTION, SOLUTION INTRAVENOUS ONCE
Status: COMPLETED | OUTPATIENT
Start: 2021-05-08 | End: 2021-05-08

## 2021-05-08 RX ORDER — HYDROCODONE BITARTRATE AND ACETAMINOPHEN 5; 325 MG/1; MG/1
1 TABLET ORAL ONCE
Status: COMPLETED | OUTPATIENT
Start: 2021-05-08 | End: 2021-05-08

## 2021-05-08 RX ORDER — ACETAMINOPHEN 325 MG/1
650 TABLET ORAL ONCE
Status: COMPLETED | OUTPATIENT
Start: 2021-05-08 | End: 2021-05-08

## 2021-05-08 RX ORDER — POTASSIUM CHLORIDE 7.45 MG/ML
10 INJECTION INTRAVENOUS PRN
Status: DISCONTINUED | OUTPATIENT
Start: 2021-05-08 | End: 2021-05-08 | Stop reason: HOSPADM

## 2021-05-08 RX ORDER — ACETAMINOPHEN 500 MG
TABLET ORAL
Status: COMPLETED
Start: 2021-05-08 | End: 2021-05-08

## 2021-05-08 RX ORDER — POTASSIUM CHLORIDE 20 MEQ/1
40 TABLET, EXTENDED RELEASE ORAL PRN
Status: DISCONTINUED | OUTPATIENT
Start: 2021-05-08 | End: 2021-05-08 | Stop reason: HOSPADM

## 2021-05-08 RX ADMIN — POTASSIUM CHLORIDE 10 MEQ: 7.46 INJECTION, SOLUTION INTRAVENOUS at 11:04

## 2021-05-08 RX ADMIN — POTASSIUM CHLORIDE 40 MEQ: 1500 TABLET, EXTENDED RELEASE ORAL at 12:05

## 2021-05-08 RX ADMIN — ACETAMINOPHEN 650 MG: 325 TABLET ORAL at 11:40

## 2021-05-08 RX ADMIN — POTASSIUM CHLORIDE 10 MEQ: 7.46 INJECTION, SOLUTION INTRAVENOUS at 10:31

## 2021-05-08 RX ADMIN — HYDROCODONE BITARTRATE AND ACETAMINOPHEN 1 TABLET: 5; 325 TABLET ORAL at 12:39

## 2021-05-08 RX ADMIN — MAGNESIUM SULFATE HEPTAHYDRATE 2000 MG: 2 INJECTION, SOLUTION INTRAVENOUS at 10:31

## 2021-05-08 RX ADMIN — POTASSIUM CHLORIDE 10 MEQ: 7.46 INJECTION, SOLUTION INTRAVENOUS at 12:07

## 2021-05-08 RX ADMIN — ACETAMINOPHEN 650 MG: 500 TABLET ORAL at 11:40

## 2021-05-08 ASSESSMENT — PAIN SCALES - GENERAL
PAINLEVEL_OUTOF10: 10
PAINLEVEL_OUTOF10: 9

## 2021-05-08 ASSESSMENT — PAIN DESCRIPTION - LOCATION: LOCATION: NECK

## 2021-05-08 ASSESSMENT — PAIN DESCRIPTION - DESCRIPTORS: DESCRIPTORS: ACHING

## 2021-05-08 ASSESSMENT — PAIN DESCRIPTION - PAIN TYPE: TYPE: ACUTE PAIN

## 2021-05-08 NOTE — ED PROVIDER NOTES
 Marital status:       Spouse name: Not on file    Number of children: Not on file    Years of education: Not on file    Highest education level: Not on file   Occupational History    Not on file   Social Needs    Financial resource strain: Not on file    Food insecurity     Worry: Not on file     Inability: Not on file    Transportation needs     Medical: Not on file     Non-medical: Not on file   Tobacco Use    Smoking status: Current Every Day Smoker    Smokeless tobacco: Never Used   Substance and Sexual Activity    Alcohol use: Never     Frequency: Never    Drug use: Yes     Types: Cocaine, Marijuana, Opiates , Methamphetamines     Comment: 2020 based on UDS, patient states she used marijuana \"yesterday\"    Sexual activity: Not on file   Lifestyle    Physical activity     Days per week: Not on file     Minutes per session: Not on file    Stress: Not on file   Relationships    Social connections     Talks on phone: Not on file     Gets together: Not on file     Attends Synagogue service: Not on file     Active member of club or organization: Not on file     Attends meetings of clubs or organizations: Not on file     Relationship status: Not on file    Intimate partner violence     Fear of current or ex partner: Not on file     Emotionally abused: Not on file     Physically abused: Not on file     Forced sexual activity: Not on file   Other Topics Concern    Not on file   Social History Narrative    Not on file       Current Facility-Administered Medications   Medication Dose Route Frequency Provider Last Rate Last Admin    potassium chloride (KLOR-CON M) extended release tablet 40 mEq  40 mEq Oral PRN Ramu Quirino [place], DO        Or    potassium bicarb-citric acid (EFFER-K) effervescent tablet 40 mEq  40 mEq Oral PRN Ramu Quirino [place], DO        Or    potassium chloride 10 mEq/100 mL IVPB (Peripheral Line)  10 mEq Intravenous PRN Ramu GAYLE Richards  mL/hr at 05/08/21 1104 10 mEq at 05/08/21 1104    magnesium sulfate 2000 mg in 50 mL IVPB premix  2,000 mg Intravenous Once [employer], DO 25 mL/hr at 05/08/21 1031 2,000 mg at 05/08/21 1031     Current Outpatient Medications   Medication Sig Dispense Refill    ondansetron (ZOFRAN ODT) 4 MG disintegrating tablet Take 1 tablet by mouth every 8 hours as needed for Nausea 15 tablet 0    dicyclomine (BENTYL) 10 MG capsule Take 1 capsule by mouth 3 times daily As needed for abdominal pain 15 capsule 3    acetaminophen (TYLENOL) 500 MG tablet Take 2 tablets by mouth 3 times daily as needed for Pain 180 tablet 0    carvedilol (COREG) 25 MG tablet Take 1 tablet by mouth 2 times daily (with meals) 60 tablet 1    amLODIPine (NORVASC) 10 MG tablet Take 1 tablet by mouth daily 30 tablet 1    docusate sodium (COLACE) 100 MG capsule Take 1 capsule by mouth 2 times daily 60 capsule 0    traZODone (DESYREL) 50 MG tablet Take 50 mg by mouth nightly      mometasone-formoterol (DULERA) 100-5 MCG/ACT inhaler Inhale 2 puffs into the lungs 2 times daily      levothyroxine (SYNTHROID) 100 MCG tablet Take 100 mcg by mouth Daily      gabapentin (NEURONTIN) 800 MG tablet Take 800 mg by mouth 3 times daily.  promethazine (PHENERGAN) 25 MG tablet Take 25 mg by mouth every 6 hours as needed for Nausea      albuterol sulfate HFA (VENTOLIN HFA) 108 (90 Base) MCG/ACT inhaler Inhale 1 puff into the lungs every 6 hours as needed for Wheezing      escitalopram (LEXAPRO) 20 MG tablet Take 20 mg by mouth daily      atorvastatin (LIPITOR) 80 MG tablet Take 80 mg by mouth nightly      aspirin 81 MG chewable tablet Take 81 mg by mouth daily      traMADol (ULTRAM) 50 MG tablet Take 50 mg by mouth every 6 hours as needed for Pain.       clopidogrel (PLAVIX) 75 MG tablet Take 75 mg by mouth daily      fluticasone-salmeterol (ADVAIR) 250-50 MCG/DOSE AEPB Inhale 1 puff into the lungs 2 times daily         No Known Allergies    Nursing Notes Reviewed      Physical Exam:  Triage VS:    ED Triage Vitals   Enc Vitals Group      BP 05/08/21 0843 (!) 143/87      Pulse 05/08/21 0843 82      Resp 05/08/21 0843 20      Temp 05/08/21 0843 97.6 °F (36.4 °C)      Temp Source 05/08/21 0843 Oral      SpO2 05/08/21 0843 98 %      Weight 05/08/21 0841 135 lb (61.2 kg)      Height 05/08/21 0841 5' 2\" (1.575 m)        My pulse ox interpretation is -WNL    Primary exam:  Airway: Intact. Speaking in normal voice. Breathing: Spontaneous. Equal chest rise and breath sounds. Circulation: Heart RRR. Pulses 2+. Secondary exam:   GENERAL APPEARANCE: Awake and alert. Cooperative. No acute distress. Following commands and answering questions. GCS is 15. Nontoxic in appearance. HEAD: Normocephalic. Atraumatic. No external masses or lesions. No depressed skull fractures. EYES: Pupils are equal, round, and reactive. EOM's grossly intact. No nystagmus. No gaze deviation. Sclera anicteric. Funduscopic exam reveals no papilledema. No periorbital ecchymosis. No [place] Eyes. ENT: No nasal drainage. Pharynx is clear. Airway is patent. Oral mucosa moist, Tolerates saliva. No Brandt sign. No hemotympanum. No CSF otorrhea or rhinorrhea. No missing dentition or dental trauma. No tongue or lip lacerations. No [place] fracture. No [place] sign. NECK: Supple. No nuchal rigidity. Trachea midline. No masses, thyromegaly or lymphadenopathy. No JVD. No carotid thrills or bruits. No midline tenderness to palpation in the cervical spine. CHEST/LUNGS: Lungs clear to auscultation bilaterally. Respirations nonlabored. No flail segments. No chest wall brusing, ecchymosis, or hematoma. No tenting of the skin. HEART: Regular rate and rhythm. Audible S1 and S2. No audible murmurs, rubs, gallops. ABDOMEN: Soft. Nontender. Nondistended. No signs of peritonitis. No guarding or rebound. Normal bowel sounds in all 4 quadrants. No periumbilical or flank ecchymosis.   Bowel sounds are auscultated in all 4 quadrants. BACK: No cervical thoracic or lumbar midline tenderness to palpation, step-offs or deformities. No clinical signs of cauda equina syndrome. EXTREMITIES: Upper and lower extremities have no acute deformities and are non-tender to palpation. Good ROM in all 4 extremities. Patient is able to ambulate without ataxia or gait instability. SKIN: Warm and dry and intact. . No acute wounds. No lacerations. No deformities. No open fractures. NEUROLOGICAL: Alert and oriented. No focal or lateralizing neurologic deficits. PERFUSION:  pulses intact and equal in all extremities. Brisk capillary refill.        I have reviewed and interpreted all of the currently available diagnostic results from this visit:    Labs:  Results for orders placed or performed during the hospital encounter of 05/08/21   CBC Auto Differential   Result Value Ref Range    WBC 10.2 4.0 - 10.5 K/CU MM    RBC 4.02 (L) 4.2 - 5.4 M/CU MM    Hemoglobin 12.0 (L) 12.5 - 16.0 GM/DL    Hematocrit 35.8 (L) 37 - 47 %    MCV 89.1 78 - 100 FL    MCH 29.9 27 - 31 PG    MCHC 33.5 32.0 - 36.0 %    RDW 14.2 11.7 - 14.9 %    Platelets 921 771 - 978 K/CU MM    MPV 8.7 7.5 - 11.1 FL    Differential Type AUTOMATED DIFFERENTIAL     Segs Relative 78.0 (H) 36 - 66 %    Lymphocytes % 12.4 (L) 24 - 44 %    Monocytes % 5.8 (H) 0 - 4 %    Eosinophils % 2.9 0 - 3 %    Basophils % 0.5 0 - 1 %    Segs Absolute 7.9 K/CU MM    Lymphocytes Absolute 1.3 K/CU MM    Monocytes Absolute 0.6 K/CU MM    Eosinophils Absolute 0.3 K/CU MM    Basophils Absolute 0.1 K/CU MM    Nucleated RBC % 0.0 %    Total Nucleated RBC 0.0 K/CU MM    Total Immature Neutrophil 0.04 K/CU MM    Immature Neutrophil % 0.4 0 - 0.43 %   SPECIMEN REJECTION   Result Value Ref Range    Rejected Test BMPX, CPK     Reason for Rejection UNABLE TO PERFORM TESTING:    Basic Metabolic Panel w/ Reflex to MG   Result Value Ref Range    Sodium 130 (L) 135 - 145 MMOL/L    Potassium 2.9 (LL) 3.5 - 5.1 MMOL/L    Chloride 101 99 - 110 mMol/L    CO2 23 21 - 32 MMOL/L    Anion Gap 6 4 - 16    BUN 16 6 - 23 MG/DL    CREATININE 0.8 0.6 - 1.1 MG/DL    Glucose 153 (H) 70 - 99 MG/DL    Calcium 8.7 8.3 - 10.6 MG/DL    GFR Non-African American >60 >60 mL/min/1.73m2    GFR African American >60 >60 mL/min/1.73m2   CK   Result Value Ref Range    Total  (H) 26 - 140 IU/L   Magnesium   Result Value Ref Range    Magnesium 1.6 (L) 1.8 - 2.4 mg/dl        Radiographs:  Xr Chest (2 Vw)    Result Date: 5/8/2021  EXAMINATION: TWO XRAY VIEWS OF THE CHEST 5/8/2021 8:53 am COMPARISON: 08/04/2020 HISTORY: ORDERING SYSTEM PROVIDED HISTORY: FFS TECHNOLOGIST PROVIDED HISTORY: Reason for exam:->FFS Reason for Exam: FFS Initial encounter FINDINGS: Patient is rotated. The right apex is partially obscured due to patient positioning. No focal consolidation, pleural effusion or pneumothorax. The cardiomediastinal silhouette is stable. No overt pulmonary edema. The osseous structures are stable. Orthopedic hardware is partially visualized in the cervical spine. No acute cardiopulmonary findings. Xr Pelvis (1-2 Views)    Result Date: 5/8/2021  EXAMINATION: ONE XRAY VIEW OF THE PELVIS 5/8/2021 8:53 am COMPARISON: CT 12/20/2020 HISTORY: ORDERING SYSTEM PROVIDED HISTORY: trauma TECHNOLOGIST PROVIDED HISTORY: Reason for exam:->trauma Reason for Exam: fall Initial encounter. FINDINGS: No acute fracture or dislocation. Joint spaces and alignment are maintained. Soft tissues are unremarkable. The pelvic ring is intact. No acute osseous abnormality. Ct Head Wo Contrast    Result Date: 5/8/2021  EXAMINATION: CT OF THE HEAD WITHOUT CONTRAST  5/8/2021 8:50 am TECHNIQUE: CT of the head was performed without the administration of intravenous contrast. Dose modulation, iterative reconstruction, and/or weight based adjustment of the mA/kV was utilized to reduce the radiation dose to as low as reasonably achievable.  COMPARISON: None. HISTORY: ORDERING SYSTEM PROVIDED HISTORY: [facility] TECHNOLOGIST PROVIDED HISTORY: Reason for exam:->s, T Has a \"code stroke\" or \"stroke alert\" been called? ->No Decision Support Exception - unselect if not a suspected or confirmed emergency medical condition->Emergency Medical Condition (MA) Reason for Exam: s, [facility] Acuity: Acute Type of Exam: Initial Mechanism of Injury: FELL LAST NIGHT FINDINGS: BRAIN/VENTRICLES: There is no acute intracranial hemorrhage, mass effect or midline shift. No abnormal extra-axial fluid collection. Hypodensities in the white matter compatible with chronic microvascular ischemic change. Gray-white matter differentiation is otherwise overall maintained. There is no evidence of hydrocephalus. ORBITS: The visualized portion of the orbits demonstrate no acute abnormality. SINUSES: The visualized paranasal sinuses and mastoid air cells demonstrate no acute abnormality. SOFT TISSUES/SKULL:  No acute abnormality of the visualized skull or soft tissues. No acute intracranial abnormality. Ct Cervical Spine Wo Contrast    Result Date: 5/8/2021  EXAMINATION: CT OF THE CERVICAL SPINE WITHOUT CONTRAST 5/8/2021 8:50 am TECHNIQUE: CT of the cervical spine was performed without the administration of intravenous contrast. Multiplanar reformatted images are provided for review. Dose modulation, iterative reconstruction, and/or weight based adjustment of the mA/kV was utilized to reduce the radiation dose to as low as reasonably achievable.  COMPARISON: None HISTORY: ORDERING SYSTEM PROVIDED HISTORY: [facility] TECHNOLOGIST PROVIDED HISTORY: Reason for exam:->S Decision Support Exception - unselect if not a suspected or confirmed emergency medical condition->Emergency Medical Condition (MA) Reason for Exam: FFS Acuity: Acute Type of Exam: Initial Mechanism of Injury: FELL LAST NIGHT FINDINGS: BONES/ALIGNMENT:  Changes of anterior fusion and discectomy from C5 to C7 with no definite changes of cervical spine anterior fusion and discectomy. Moderate cervical spinal degenerative changes most severe in the facet joints. Chest x-ray radiology report reads no acute cardiopulmonary findings. X-ray of the pelvis radiology report reads no acute osseous abnormality. Labs demonstrate no leukocytosis. Patient does have normocytic anemia hemoglobin 12.0. No thrombocytopenia. Her lites demonstrate mild hyponatremia sodium 130. Patient is hypokalemia with potassium and 2.9. She is hypomagnesemic with a magnesium of 1.6. CPK is 359. On repeat evaluations, patient remains hemodynamically stable. Magnesium and potassium replacement are ordered. Repeat secondary survey remained stable without any additional traumatic injuries identified. Results of laboratory radiographic data along differential diagnosis and treatment plan are discussed. Patient presents as post fall. No acute traumatic injuries identified. Patient notes improvement, she is able to ambulate, and requests discharge. Instructed to follow-up with her primary care provider in 3 to 5 days for reevaluation. We recommend repeat labs to evaluate for hyponatremia, hypomagnesemia, hypokalemia. Instructed to return to the emergency department immediately with any new, worsening, concerning symptoms. Additional verbal and printed discharge instructions were provided. Patient verbalizes understanding is agreeable discharge plan. Is discharged in stable, ambulatory condition. Clinical Impression:  1. Fall from standing, initial encounter    2. Blunt head trauma, initial encounter    3. Platelet inhibition due to Plavix        Procedures:  ATLS protocol      Disposition referral:  [address] [id number]  [phone]    In 3 days  Please follow-up with your primary care provider for reevaluation.     [facility] Emergency Department  [address] [facility] [address] [facility] [address]  [phone]  Go to   Immediately with any new, worsening, concerning symptoms. ED Provider Disposition:  DISPOSITION Decision To Discharge 05/08/2021 11:33:16 AM        Comment: Please note this report has been produced using speech recognition software and may contain errors related to that system including errors in grammar, punctuation, and spelling, as well as words and phrases that may be inappropriate. Efforts were made to edit the dictations.        [address] [facility],   05/08/21 4244

## 2021-05-08 NOTE — ED NOTES
Tried [facility], first phone number is no longer in service, second phone number was busy signal.     Alexander Delgado RN  05/08/21 8155

## 2021-05-08 NOTE — CARE COORDINATION
FLAKITO received a request from Caroline Forrester that patient needed transportation home. Patient stated that she did not have any other means of transport to get home. CM called [employer] @ [phone] and arranged transport for patient to return home to [address]. [facility]

## 2021-05-08 NOTE — ED TRIAGE NOTES
Patient fell last night. Complaints of neck and head pain  C-Collar applied.   Patient on blood thinners

## 2021-05-08 NOTE — ED NOTES
Spoke with Dr Nicolle Ramirez for patient to have oral potassium and be discharged.      Angelo Allen RN  05/08/21 2062

## 2021-05-08 NOTE — ED NOTES
Present to the ED with a fall that happened last night per patient she tripped over a vacuum. Patient states she has all over body pain, head and neck pain as well. Patient is on blood thinner. Patient ambulated to room 19 with steady gate. Respirations even and unlabored. Patient placed on a monitor and in a gown. RN carmelita labs with IV access start. Dg Mcmanus.  [facility]  05/08/21 9962

## 2021-05-08 NOTE — ED NOTES
Tylenol given, pt asks for straw, while going to get a straw, pt dumps entire ice water all over herself and the bed.       Jacki Lucero RN  05/08/21 3994

## 2021-05-08 NOTE — ED NOTES
Patient IV placed and labs collected       [facility]  05/08/21 2438

## 2021-09-06 ENCOUNTER — HOSPITAL ENCOUNTER (EMERGENCY)
Age: 61
Discharge: LEFT BEFORE DISCHARGE | End: 2021-09-07
Payer: MEDICARE

## 2021-09-06 VITALS
HEART RATE: 81 BPM | DIASTOLIC BLOOD PRESSURE: 82 MMHG | SYSTOLIC BLOOD PRESSURE: 124 MMHG | TEMPERATURE: 97.8 F | RESPIRATION RATE: 16 BRPM | OXYGEN SATURATION: 96 %

## 2021-09-07 ENCOUNTER — APPOINTMENT (OUTPATIENT)
Dept: GENERAL RADIOLOGY | Age: 61
End: 2021-09-07
Payer: MEDICARE

## 2021-09-07 PROCEDURE — 80053 COMPREHEN METABOLIC PANEL: CPT

## 2021-09-07 NOTE — ED NOTES
Called again, no answer. Pt assumed to have left and not be coming back.       Jamison Hassan RN  09/07/21 5017

## 2021-09-07 NOTE — ED TRIAGE NOTES
Pt presents to the ED with complaints of shortness of breath. Pt reports she has been feeling like this for the past couple hours. Pt also reports feeling anxious.

## 2021-10-08 NOTE — ED NOTES
[address] Cardiology - [address]  Phone: [phone]  Fax: [phone]           Madeline Martinez CNP      November 9, 2021     Patient: Sundeep Wu   MR Number: <[id number]>   YOB: 1943   Date of Visit: 10/8/2021       Dear Dr. Valery Anthony: Thank you for referring Ric Tavares to me for evaluation/treatment. Below are the relevant portions of my assessment and plan of care. If you have questions, please do not hesitate to call me. I look forward to following Janessa Downing along with you.     Sincerely,        Mayford Gasmen, APRN - CNP    CC providers:  Rayleen Goldberg, APRN - CNP  [address]  [place] Patient appears comfortable at this time. Denies complaints. Currently resting with eyes closed. Respirations even and unlabored. 0 s/sx of distress noted.       Barbra Huerta RN  08/12/20 9503

## (undated) DEVICE — SIGMOIDOSCOPE MED L25CM DIA20MM W/ OBT DISP KLEENSPEC

## (undated) DEVICE — ELECTRODE ES AD CRDLSS PT RET REM POLYHESIVE

## (undated) DEVICE — Device: Brand: DISPOSABLE BULB & BLADDER

## (undated) DEVICE — RELOAD STPL H41XL60MM GRN THCK B FORM NAT ARTC ECHELON

## (undated) DEVICE — TUBING, SUCTION, 3/16" X 10', STRAIGHT: Brand: MEDLINE

## (undated) DEVICE — Z DISCONTINUED NO SUB IDED TUBING ETCO2 AD L6.5FT NSL ORAL CVD PRNG NONFLARED TIP OVR

## (undated) DEVICE — TOWEL,OR,DSP,ST,BLUE,STD,6/PK,12PK/CS: Brand: MEDLINE

## (undated) DEVICE — FORCEPS BX L240CM JAW DIA2.8MM L CAP W/ NDL MIC MESH TOOTH

## (undated) DEVICE — SEALER ENDOSCP NANO COAT OPN DIV CRV L JAW LIGASURE IMPACT

## (undated) DEVICE — SEALANT HEMSTAT 5ML HUM FIBRIN THROM 2 VI APPL DEV EVICEL

## (undated) DEVICE — SUTURE PERMAHAND SZ 2-0 L17X18IN NONABSORBABLE BLK SILK SA65H

## (undated) DEVICE — PAD GZ BORD ST 4INX10IN 2X8IN

## (undated) DEVICE — SPONGE LAP W18XL18IN WHT COT 4 PLY FLD STRUNG RADPQ DISP ST

## (undated) DEVICE — SUTURE STRATAFIX SPRL PDS + VLT 3-0 15CM DISPOSABLE/SNGLE SXPP1B420

## (undated) DEVICE — TOWEL,OR,DSP,ST,WHITE,DLX,XR,4/PK,20PK/C: Brand: MEDLINE

## (undated) DEVICE — SUTURE STRATAFIX SYMMETRIC SZ 1 L18IN ABSRB VLT CT1 L36CM SXPP1A404

## (undated) DEVICE — GAUZE,SPONGE,4"X4",16PLY,XRAY,STRL,LF: Brand: MEDLINE

## (undated) DEVICE — COUNTER NDL 30 COUNT FOAM STRP SGL MAG

## (undated) DEVICE — STAPLER SKIN H3.9MM WIRE DIA0.58MM CRWN 6.9MM 35 STPL ROT

## (undated) DEVICE — RELOAD STPL L60MM H1.5-3.6MM REG TISS BLU GRIPPING SURF B

## (undated) DEVICE — TOTAL TRAY, DB, 100% SILI FOLEY, 16FR 10: Brand: MEDLINE

## (undated) DEVICE — APPLICATOR MEDICATED 26 CC SOLUTION HI LT ORNG CHLORAPREP

## (undated) DEVICE — SUTURE PERMA-HAND SZ 3-0 L18IN 17 STRND NONABSORBABLE BLK SA64H